# Patient Record
Sex: FEMALE | Race: WHITE | NOT HISPANIC OR LATINO | Employment: FULL TIME | URBAN - METROPOLITAN AREA
[De-identification: names, ages, dates, MRNs, and addresses within clinical notes are randomized per-mention and may not be internally consistent; named-entity substitution may affect disease eponyms.]

---

## 2018-07-17 ENCOUNTER — TRANSCRIBE ORDERS (OUTPATIENT)
Dept: ADMINISTRATIVE | Facility: HOSPITAL | Age: 53
End: 2018-07-17

## 2018-07-17 DIAGNOSIS — Z13.820 SCREENING FOR OSTEOPOROSIS: ICD-10-CM

## 2018-07-17 DIAGNOSIS — Z12.31 SCREENING MAMMOGRAM, ENCOUNTER FOR: Primary | ICD-10-CM

## 2018-07-25 ENCOUNTER — APPOINTMENT (OUTPATIENT)
Dept: BONE DENSITY | Facility: HOSPITAL | Age: 53
End: 2018-07-25

## 2018-07-25 ENCOUNTER — HOSPITAL ENCOUNTER (OUTPATIENT)
Dept: MAMMOGRAPHY | Facility: HOSPITAL | Age: 53
Discharge: HOME | End: 2018-07-25
Admitting: FAMILY MEDICINE

## 2018-07-25 DIAGNOSIS — Z13.820 SCREENING FOR OSTEOPOROSIS: ICD-10-CM

## 2018-07-25 DIAGNOSIS — Z12.31 SCREENING MAMMOGRAM, ENCOUNTER FOR: ICD-10-CM

## 2018-07-25 PROCEDURE — 77080 DXA BONE DENSITY AXIAL: CPT

## 2018-07-25 PROCEDURE — 77063 BREAST TOMOSYNTHESIS BI: CPT

## 2018-07-25 PROCEDURE — 77067 SCR MAMMO BI INCL CAD: CPT

## 2018-10-29 ENCOUNTER — LAB (OUTPATIENT)
Dept: LAB | Facility: HOSPITAL | Age: 53
End: 2018-10-29

## 2018-10-29 ENCOUNTER — TRANSCRIBE ORDERS (OUTPATIENT)
Dept: ADMINISTRATIVE | Facility: HOSPITAL | Age: 53
End: 2018-10-29

## 2018-10-29 DIAGNOSIS — Z79.1 ENCOUNTER FOR LONG-TERM (CURRENT) USE OF NON-STEROIDAL ANTI-INFLAMMATORIES: ICD-10-CM

## 2018-10-29 DIAGNOSIS — M05.79 SEROPOSITIVE RHEUMATOID ARTHRITIS OF MULTIPLE SITES (HCC): ICD-10-CM

## 2018-10-29 DIAGNOSIS — M05.79 SEROPOSITIVE RHEUMATOID ARTHRITIS OF MULTIPLE SITES (HCC): Primary | ICD-10-CM

## 2018-10-29 LAB
ALBUMIN SERPL-MCNC: 4.1 G/DL (ref 3.5–5.2)
ALP SERPL-CCNC: 79 U/L (ref 40–129)
ALT SERPL W P-5'-P-CCNC: 22 U/L (ref 5–33)
AST SERPL-CCNC: 18 U/L (ref 5–32)
BASOPHILS # BLD AUTO: 0.04 10*3/MM3 (ref 0–0.2)
BASOPHILS NFR BLD AUTO: 0.5 % (ref 0–2)
BILIRUB CONJ SERPL-MCNC: <0.2 MG/DL (ref 0.2–0.3)
BILIRUB INDIRECT SERPL-MCNC: ABNORMAL MG/DL
BILIRUB SERPL-MCNC: 0.3 MG/DL (ref 0.2–1.2)
DEPRECATED RDW RBC AUTO: 41.7 FL (ref 37–54)
EOSINOPHIL # BLD AUTO: 0.08 10*3/MM3 (ref 0.1–0.3)
EOSINOPHIL NFR BLD AUTO: 1 % (ref 0–4)
ERYTHROCYTE [DISTWIDTH] IN BLOOD BY AUTOMATED COUNT: 13.1 % (ref 11.5–14.5)
HCT VFR BLD AUTO: 39.1 % (ref 37–47)
HGB BLD-MCNC: 12.4 G/DL (ref 12–16)
IMM GRANULOCYTES # BLD: 0.05 10*3/MM3 (ref 0–0.03)
IMM GRANULOCYTES NFR BLD: 0.6 % (ref 0–0.5)
LYMPHOCYTES # BLD AUTO: 2.38 10*3/MM3 (ref 0.6–4.8)
LYMPHOCYTES NFR BLD AUTO: 30 % (ref 20–45)
MCH RBC QN AUTO: 27.9 PG (ref 27–31)
MCHC RBC AUTO-ENTMCNC: 31.7 G/DL (ref 31–37)
MCV RBC AUTO: 87.9 FL (ref 81–99)
MONOCYTES # BLD AUTO: 0.58 10*3/MM3 (ref 0–1)
MONOCYTES NFR BLD AUTO: 7.3 % (ref 3–8)
NEUTROPHILS # BLD AUTO: 4.81 10*3/MM3 (ref 1.5–8.3)
NEUTROPHILS NFR BLD AUTO: 60.6 % (ref 45–70)
NRBC BLD MANUAL-RTO: 0 /100 WBC (ref 0–0)
PLATELET # BLD AUTO: 336 10*3/MM3 (ref 140–500)
PMV BLD AUTO: 9.6 FL (ref 7.4–10.4)
PROT SERPL-MCNC: 7.6 G/DL (ref 6–8.5)
RBC # BLD AUTO: 4.45 10*6/MM3 (ref 4.2–5.4)
WBC NRBC COR # BLD: 7.94 10*3/MM3 (ref 4.8–10.8)

## 2018-10-29 PROCEDURE — 85025 COMPLETE CBC W/AUTO DIFF WBC: CPT

## 2018-10-29 PROCEDURE — 80076 HEPATIC FUNCTION PANEL: CPT

## 2018-10-29 PROCEDURE — 36415 COLL VENOUS BLD VENIPUNCTURE: CPT

## 2018-12-31 ENCOUNTER — TRANSCRIBE ORDERS (OUTPATIENT)
Dept: ADMINISTRATIVE | Facility: HOSPITAL | Age: 53
End: 2018-12-31

## 2018-12-31 ENCOUNTER — LAB (OUTPATIENT)
Dept: LAB | Facility: HOSPITAL | Age: 53
End: 2018-12-31

## 2018-12-31 DIAGNOSIS — M06.9 RHEUMATOID ARTHRITIS, INVOLVING UNSPECIFIED SITE, UNSPECIFIED RHEUMATOID FACTOR PRESENCE: Primary | ICD-10-CM

## 2018-12-31 DIAGNOSIS — M06.9 RHEUMATOID ARTHRITIS, INVOLVING UNSPECIFIED SITE, UNSPECIFIED RHEUMATOID FACTOR PRESENCE: ICD-10-CM

## 2018-12-31 DIAGNOSIS — Z92.25 STATUS POST RECENT IMMUNOSUPPRESSIVE THERAPY: ICD-10-CM

## 2018-12-31 LAB
ALBUMIN SERPL-MCNC: 4.3 G/DL (ref 3.5–5.2)
ALP SERPL-CCNC: 98 U/L (ref 40–129)
ALT SERPL W P-5'-P-CCNC: 21 U/L (ref 5–33)
AST SERPL-CCNC: 16 U/L (ref 5–32)
BASOPHILS # BLD AUTO: 0.04 10*3/MM3 (ref 0–0.2)
BASOPHILS NFR BLD AUTO: 0.4 % (ref 0–2)
BILIRUB CONJ SERPL-MCNC: <0.2 MG/DL (ref 0.2–0.3)
BILIRUB INDIRECT SERPL-MCNC: ABNORMAL MG/DL
BILIRUB SERPL-MCNC: 0.3 MG/DL (ref 0.2–1.2)
DEPRECATED RDW RBC AUTO: 40.4 FL (ref 37–54)
EOSINOPHIL # BLD AUTO: 0.08 10*3/MM3 (ref 0.1–0.3)
EOSINOPHIL NFR BLD AUTO: 0.8 % (ref 0–4)
ERYTHROCYTE [DISTWIDTH] IN BLOOD BY AUTOMATED COUNT: 13 % (ref 11.5–14.5)
HCT VFR BLD AUTO: 41.6 % (ref 37–47)
HGB BLD-MCNC: 13.5 G/DL (ref 12–16)
IMM GRANULOCYTES # BLD AUTO: 0.05 10*3/MM3 (ref 0–0.03)
IMM GRANULOCYTES NFR BLD AUTO: 0.5 % (ref 0–0.5)
LYMPHOCYTES # BLD AUTO: 2.49 10*3/MM3 (ref 0.6–4.8)
LYMPHOCYTES NFR BLD AUTO: 23.9 % (ref 20–45)
MCH RBC QN AUTO: 28 PG (ref 27–31)
MCHC RBC AUTO-ENTMCNC: 32.5 G/DL (ref 31–37)
MCV RBC AUTO: 86.1 FL (ref 81–99)
MONOCYTES # BLD AUTO: 0.67 10*3/MM3 (ref 0–1)
MONOCYTES NFR BLD AUTO: 6.4 % (ref 3–8)
NEUTROPHILS # BLD AUTO: 7.11 10*3/MM3 (ref 1.5–8.3)
NEUTROPHILS NFR BLD AUTO: 68 % (ref 45–70)
NRBC BLD AUTO-RTO: 0 /100 WBC (ref 0–0)
PLATELET # BLD AUTO: 359 10*3/MM3 (ref 140–500)
PMV BLD AUTO: 9.5 FL (ref 7.4–10.4)
PROT SERPL-MCNC: 8 G/DL (ref 6–8.5)
RBC # BLD AUTO: 4.83 10*6/MM3 (ref 4.2–5.4)
WBC NRBC COR # BLD: 10.44 10*3/MM3 (ref 4.8–10.8)

## 2018-12-31 PROCEDURE — 36415 COLL VENOUS BLD VENIPUNCTURE: CPT

## 2018-12-31 PROCEDURE — 80076 HEPATIC FUNCTION PANEL: CPT

## 2018-12-31 PROCEDURE — 85025 COMPLETE CBC W/AUTO DIFF WBC: CPT

## 2019-03-24 ENCOUNTER — TRANSCRIBE ORDERS (OUTPATIENT)
Dept: ADMINISTRATIVE | Facility: HOSPITAL | Age: 54
End: 2019-03-24

## 2019-03-24 ENCOUNTER — LAB (OUTPATIENT)
Dept: LAB | Facility: HOSPITAL | Age: 54
End: 2019-03-24

## 2019-03-24 DIAGNOSIS — Z92.25 STATUS POST RECENT IMMUNOSUPPRESSIVE THERAPY: ICD-10-CM

## 2019-03-24 DIAGNOSIS — M05.79 SEROPOSITIVE RHEUMATOID ARTHRITIS OF MULTIPLE SITES (HCC): Primary | ICD-10-CM

## 2019-03-24 DIAGNOSIS — M05.79 SEROPOSITIVE RHEUMATOID ARTHRITIS OF MULTIPLE SITES (HCC): ICD-10-CM

## 2019-03-24 LAB
ALBUMIN SERPL-MCNC: 4.1 G/DL (ref 3.5–5.2)
ALP SERPL-CCNC: 73 U/L (ref 39–117)
ALT SERPL W P-5'-P-CCNC: 22 U/L (ref 1–33)
AST SERPL-CCNC: 17 U/L (ref 1–32)
BILIRUB CONJ SERPL-MCNC: <0.2 MG/DL (ref 0–0.3)
BILIRUB INDIRECT SERPL-MCNC: NORMAL MG/DL
BILIRUB SERPL-MCNC: 0.2 MG/DL (ref 0.2–1.2)
PROT SERPL-MCNC: 7.4 G/DL (ref 6–8.5)

## 2019-03-24 PROCEDURE — 80076 HEPATIC FUNCTION PANEL: CPT

## 2019-03-24 PROCEDURE — 85025 COMPLETE CBC W/AUTO DIFF WBC: CPT

## 2019-03-24 PROCEDURE — 36415 COLL VENOUS BLD VENIPUNCTURE: CPT

## 2019-03-25 LAB
BASOPHILS # BLD AUTO: 0.02 10*3/MM3 (ref 0–0.2)
BASOPHILS NFR BLD AUTO: 0.3 % (ref 0–1.5)
DEPRECATED RDW RBC AUTO: 48.7 FL (ref 37–54)
EOSINOPHIL # BLD AUTO: 0.12 10*3/MM3 (ref 0–0.4)
EOSINOPHIL NFR BLD AUTO: 2 % (ref 0.3–6.2)
ERYTHROCYTE [DISTWIDTH] IN BLOOD BY AUTOMATED COUNT: 14.2 % (ref 12.3–15.4)
HCT VFR BLD AUTO: 43.6 % (ref 34–46.6)
HGB BLD-MCNC: 12.8 G/DL (ref 12–15.9)
IMM GRANULOCYTES # BLD AUTO: 0.02 10*3/MM3 (ref 0–0.05)
IMM GRANULOCYTES NFR BLD AUTO: 0.3 % (ref 0–0.5)
LYMPHOCYTES # BLD AUTO: 2.43 10*3/MM3 (ref 0.7–3.1)
LYMPHOCYTES NFR BLD AUTO: 41.4 % (ref 19.6–45.3)
MCH RBC QN AUTO: 27.8 PG (ref 26.6–33)
MCHC RBC AUTO-ENTMCNC: 29.4 G/DL (ref 31.5–35.7)
MCV RBC AUTO: 94.6 FL (ref 79–97)
MONOCYTES # BLD AUTO: 0.61 10*3/MM3 (ref 0.1–0.9)
MONOCYTES NFR BLD AUTO: 10.4 % (ref 5–12)
NEUTROPHILS # BLD AUTO: 2.67 10*3/MM3 (ref 1.4–7)
NEUTROPHILS NFR BLD AUTO: 45.6 % (ref 42.7–76)
NRBC BLD AUTO-RTO: 0.2 /100 WBC (ref 0–0)
PLATELET # BLD AUTO: 302 10*3/MM3 (ref 140–450)
PMV BLD AUTO: 10.7 FL (ref 6–12)
RBC # BLD AUTO: 4.61 10*6/MM3 (ref 3.77–5.28)
WBC NRBC COR # BLD: 5.87 10*3/MM3 (ref 3.4–10.8)

## 2019-07-19 ENCOUNTER — TRANSCRIBE ORDERS (OUTPATIENT)
Dept: ADMINISTRATIVE | Facility: HOSPITAL | Age: 54
End: 2019-07-19

## 2019-07-19 DIAGNOSIS — Z12.31 VISIT FOR SCREENING MAMMOGRAM: Primary | ICD-10-CM

## 2019-07-31 ENCOUNTER — LAB (OUTPATIENT)
Dept: LAB | Facility: HOSPITAL | Age: 54
End: 2019-07-31

## 2019-07-31 ENCOUNTER — HOSPITAL ENCOUNTER (OUTPATIENT)
Dept: MAMMOGRAPHY | Facility: HOSPITAL | Age: 54
Discharge: HOME | End: 2019-07-31
Admitting: FAMILY MEDICINE

## 2019-07-31 ENCOUNTER — TRANSCRIBE ORDERS (OUTPATIENT)
Dept: ADMINISTRATIVE | Facility: HOSPITAL | Age: 54
End: 2019-07-31

## 2019-07-31 DIAGNOSIS — M05.79 SEROPOSITIVE RHEUMATOID ARTHRITIS OF MULTIPLE SITES (HCC): Primary | ICD-10-CM

## 2019-07-31 DIAGNOSIS — M05.79 SEROPOSITIVE RHEUMATOID ARTHRITIS OF MULTIPLE SITES (HCC): ICD-10-CM

## 2019-07-31 DIAGNOSIS — Z12.31 VISIT FOR SCREENING MAMMOGRAM: ICD-10-CM

## 2019-07-31 DIAGNOSIS — Z92.25 STATUS POST RECENT IMMUNOSUPPRESSIVE THERAPY: ICD-10-CM

## 2019-07-31 PROCEDURE — 77063 BREAST TOMOSYNTHESIS BI: CPT

## 2019-07-31 PROCEDURE — 36415 COLL VENOUS BLD VENIPUNCTURE: CPT

## 2019-07-31 PROCEDURE — 77067 SCR MAMMO BI INCL CAD: CPT

## 2019-07-31 PROCEDURE — 80076 HEPATIC FUNCTION PANEL: CPT

## 2019-07-31 PROCEDURE — 85025 COMPLETE CBC W/AUTO DIFF WBC: CPT

## 2019-08-01 LAB
ALBUMIN SERPL-MCNC: 4.3 G/DL (ref 3.5–5.2)
ALP SERPL-CCNC: 73 U/L (ref 39–117)
ALT SERPL W P-5'-P-CCNC: 22 U/L (ref 1–33)
AST SERPL-CCNC: 22 U/L (ref 1–32)
BASOPHILS # BLD AUTO: 0.04 10*3/MM3 (ref 0–0.2)
BASOPHILS NFR BLD AUTO: 0.6 % (ref 0–1.5)
BILIRUB CONJ SERPL-MCNC: <0.2 MG/DL (ref 0.2–0.3)
BILIRUB INDIRECT SERPL-MCNC: ABNORMAL MG/DL
BILIRUB SERPL-MCNC: 0.2 MG/DL (ref 0.2–1.2)
DEPRECATED RDW RBC AUTO: 45 FL (ref 37–54)
EOSINOPHIL # BLD AUTO: 0.12 10*3/MM3 (ref 0–0.4)
EOSINOPHIL NFR BLD AUTO: 1.9 % (ref 0.3–6.2)
ERYTHROCYTE [DISTWIDTH] IN BLOOD BY AUTOMATED COUNT: 13.7 % (ref 12.3–15.4)
HCT VFR BLD AUTO: 43.1 % (ref 34–46.6)
HGB BLD-MCNC: 13.2 G/DL (ref 12–15.9)
IMM GRANULOCYTES # BLD AUTO: 0.04 10*3/MM3 (ref 0–0.05)
IMM GRANULOCYTES NFR BLD AUTO: 0.6 % (ref 0–0.5)
LYMPHOCYTES # BLD AUTO: 2.2 10*3/MM3 (ref 0.7–3.1)
LYMPHOCYTES NFR BLD AUTO: 34.3 % (ref 19.6–45.3)
MCH RBC QN AUTO: 28.1 PG (ref 26.6–33)
MCHC RBC AUTO-ENTMCNC: 30.6 G/DL (ref 31.5–35.7)
MCV RBC AUTO: 91.9 FL (ref 79–97)
MONOCYTES # BLD AUTO: 0.77 10*3/MM3 (ref 0.1–0.9)
MONOCYTES NFR BLD AUTO: 12 % (ref 5–12)
NEUTROPHILS # BLD AUTO: 3.25 10*3/MM3 (ref 1.7–7)
NEUTROPHILS NFR BLD AUTO: 50.6 % (ref 42.7–76)
NRBC BLD AUTO-RTO: 0 /100 WBC (ref 0–0.2)
PLATELET # BLD AUTO: 333 10*3/MM3 (ref 140–450)
PMV BLD AUTO: 10.5 FL (ref 6–12)
PROT SERPL-MCNC: 7.9 G/DL (ref 6–8.5)
RBC # BLD AUTO: 4.69 10*6/MM3 (ref 3.77–5.28)
WBC NRBC COR # BLD: 6.42 10*3/MM3 (ref 3.4–10.8)

## 2020-05-13 ENCOUNTER — TRANSCRIBE ORDERS (OUTPATIENT)
Dept: ADMINISTRATIVE | Facility: HOSPITAL | Age: 55
End: 2020-05-13

## 2020-05-13 ENCOUNTER — HOSPITAL ENCOUNTER (OUTPATIENT)
Dept: GENERAL RADIOLOGY | Facility: HOSPITAL | Age: 55
Discharge: HOME | End: 2020-05-13

## 2020-05-13 ENCOUNTER — HOSPITAL ENCOUNTER (OUTPATIENT)
Dept: GENERAL RADIOLOGY | Facility: HOSPITAL | Age: 55
Discharge: HOME | End: 2020-05-13
Admitting: INTERNAL MEDICINE

## 2020-05-13 DIAGNOSIS — M05.79 SEROPOSITIVE RHEUMATOID ARTHRITIS OF MULTIPLE SITES (HCC): ICD-10-CM

## 2020-05-13 DIAGNOSIS — M15.0 PRIMARY GENERALIZED HYPERTROPHIC OSTEOARTHROSIS: ICD-10-CM

## 2020-05-13 DIAGNOSIS — M05.79 SEROPOSITIVE RHEUMATOID ARTHRITIS OF MULTIPLE SITES (HCC): Primary | ICD-10-CM

## 2020-05-13 PROCEDURE — 73130 X-RAY EXAM OF HAND: CPT

## 2020-05-13 PROCEDURE — 72040 X-RAY EXAM NECK SPINE 2-3 VW: CPT

## 2020-10-26 ENCOUNTER — TRANSCRIBE ORDERS (OUTPATIENT)
Dept: ADMINISTRATIVE | Facility: HOSPITAL | Age: 55
End: 2020-10-26

## 2020-10-26 DIAGNOSIS — R10.9 ABDOMINAL PAIN, UNSPECIFIED ABDOMINAL LOCATION: Primary | ICD-10-CM

## 2020-10-30 ENCOUNTER — HOSPITAL ENCOUNTER (OUTPATIENT)
Dept: CT IMAGING | Facility: HOSPITAL | Age: 55
Discharge: HOME | End: 2020-10-30
Admitting: FAMILY MEDICINE

## 2020-10-30 DIAGNOSIS — R10.9 ABDOMINAL PAIN, UNSPECIFIED ABDOMINAL LOCATION: ICD-10-CM

## 2020-10-30 PROCEDURE — 74177 CT ABD & PELVIS W/CONTRAST: CPT

## 2020-10-30 PROCEDURE — 0 DIATRIZOATE MEGLUMINE & SODIUM PER 1 ML: Performed by: FAMILY MEDICINE

## 2020-10-30 PROCEDURE — 0 IOPAMIDOL PER 1 ML: Performed by: FAMILY MEDICINE

## 2020-10-30 RX ADMIN — IOPAMIDOL 100 ML: 755 INJECTION, SOLUTION INTRAVENOUS at 12:00

## 2020-10-30 RX ADMIN — DIATRIZOATE MEGLUMINE AND DIATRIZOATE SODIUM 30 ML: 600; 100 SOLUTION ORAL; RECTAL at 10:30

## 2020-11-12 ENCOUNTER — OFFICE VISIT (OUTPATIENT)
Dept: GASTROENTEROLOGY | Facility: CLINIC | Age: 55
End: 2020-11-12

## 2020-11-12 VITALS — TEMPERATURE: 98.2 F | BODY MASS INDEX: 33.84 KG/M2 | OXYGEN SATURATION: 98 % | WEIGHT: 185 LBS

## 2020-11-12 DIAGNOSIS — K21.00 GASTROESOPHAGEAL REFLUX DISEASE WITH ESOPHAGITIS WITHOUT HEMORRHAGE: ICD-10-CM

## 2020-11-12 DIAGNOSIS — R19.00 PELVIC MASS: Primary | ICD-10-CM

## 2020-11-12 DIAGNOSIS — K59.09 OTHER CONSTIPATION: ICD-10-CM

## 2020-11-12 DIAGNOSIS — Z12.11 ENCOUNTER FOR SCREENING FOR MALIGNANT NEOPLASM OF COLON: ICD-10-CM

## 2020-11-12 DIAGNOSIS — K44.9 HIATAL HERNIA: ICD-10-CM

## 2020-11-12 PROCEDURE — 99204 OFFICE O/P NEW MOD 45 MIN: CPT | Performed by: INTERNAL MEDICINE

## 2020-11-12 RX ORDER — FOLIC ACID 1 MG/1
1 TABLET ORAL DAILY
COMMUNITY
End: 2022-03-28

## 2020-11-12 RX ORDER — ETANERCEPT 50 MG/ML
50 SOLUTION SUBCUTANEOUS 2 TIMES WEEKLY
COMMUNITY
End: 2022-03-28

## 2020-11-12 RX ORDER — SULINDAC 150 MG/1
150 TABLET ORAL 2 TIMES DAILY
COMMUNITY
End: 2020-12-22 | Stop reason: HOSPADM

## 2020-11-12 RX ORDER — OMEPRAZOLE 40 MG/1
40 CAPSULE, DELAYED RELEASE ORAL DAILY
Qty: 90 CAPSULE | Refills: 3 | Status: SHIPPED | OUTPATIENT
Start: 2020-11-12 | End: 2022-03-28

## 2020-11-12 NOTE — PROGRESS NOTES
PATIENT INFORMATION  Fanny Winchester       - 1965    CHIEF COMPLAINT  Chief Complaint   Patient presents with   • Abdominal Pain   • Constipation       HISTORY OF PRESENT ILLNESS  Here for several reasons mostly for screening but due to abd distension she has a vey large ovarian cyst to be reomoved tomorrow    Also a H/H and mild constipation and is to take a bowel prep prior to her resection    No dysphagia nor vomiting nor weight loss, reviewed her CT images with her.      REVIEWED PERTINENT RESULTS/ LABS  No results found for: CASEREPORT, FINALDX  Lab Results   Component Value Date    HGB 13.2 2019    MCV 91.9 2019     2019    ALT 22 2019    AST 22 2019      Ct Abdomen Pelvis With Contrast    Result Date: 10/30/2020  Narrative: PROCEDURE: CT Abdomen Pelvis W INDICATION: ABDOMINAL Pain; unspecified abdominal pain  Pt states generalized abd pain and constipation off and on since July of this year; Pt states occasional nausea; Hysterectomy, pt unsure if she has ovaries TECHNIQUE:  CT  abdomen and pelvis are performed after IV contrast.  Oral contrast administered with normal oral dosing protocol.  Coronal and sagittal reconstructions were obtained.  Radiation dose reduction techniques included automated exposure control or exposure modulation based on body size. Count of known CT and cardiac nuc med studies performed in previous 12 months: 2. .  COMPARISON: No relevant comparison or correlation studies available at time of dictation.  FINDINGS: Artifacts:  No significant artifacts, Mesentery/peritoneal cavity: No free air or inflammatory change appreciated. Trace amount of free fluid seen in the pelvis, nonspecific. Liver:  Within normal limits  Spleen:  Within normal limits  Kidneys: Compression affect upon the distal right ureter due to the large mass in the pelvis, see separate heading of the pelvis. Pancreas:  Within normal limits  Gallbladder/ bile ducts:  No gross  gallstones seen.  No biliary dilatation noted. Adrenal glands:  Nonenlarged.  Pelvis:   Large pelvic mass extending into the abdomen identified. The mass is well circumscribed measuring approximately 23 x 20 x 15.5 cm. There are numerous septations within the lesion as well as numerous densities including a dominant cystic area measuring 9.6 cm and ill-defined area of high attenuation estimated around 12 x 7 cm on the left side of the lesion. In the right hemipelvis along the inferior margin lesion, small structure with mixed density measuring 2.8 x 2 cm could be the right adnexa. The uterus is surgically absent. Normal left adnexa not seen. Other:  Abnormal appearance of the GE junction with dilated distal esophagus estimated at 5.4 cm. There is retention of enteric contrast in the distal esophagus. This may be a large hiatal hernia. Osseous structures:  No acute osseous abnormality seen.      Impression: 1. Very large pelvic mass measuring over 20 cm in size with mixed densities. There is a history of hysterectomy but patient is unsure if ovaries were removed. This is most likely to represent an ovarian neoplasm though determining malignant versus benign is difficult by imaging. At this time no lymphadenopathy is seen. The mass also produces mild hydronephrosis of the right kidney. 2. Enlargement of the GE junction with dilatation of the esophagus and retention of contrast. Consider upper GI follow-up to exclude other pathology. Given the concern for unexpected large neoplasm and potential malignancy, Dr. Perez was contacted. He was unavailable at the time of the call but, nurse in office, Serene verbally knowledge results and will contact Dr. Perez by text message. I provided a cell phone number to discuss results directly if desired.   Signer Name: Alisha De La Vega MD  Signed: 10/30/2020 1:45 PM  Workstation Name: [facility]  [facility]      REVIEW OF SYSTEMS  Review of Systems  "  Gastrointestinal: Positive for abdominal pain and constipation.   All other systems reviewed and are negative.        ACTIVE PROBLEMS  There are no active problems to display for this patient.        PAST MEDICAL HISTORY  History reviewed. No pertinent past medical history.      SURGICAL HISTORY  History reviewed. No pertinent surgical history.      FAMILY HISTORY  Family History   Problem Relation Age of Onset   • Colon cancer Maternal Aunt    • Colon polyps Maternal Aunt    • Breast cancer Neg Hx          SOCIAL HISTORY  Social History     Occupational History   • Not on file   Tobacco Use   • Smoking status: Never Smoker   • Smokeless tobacco: Never Used   Substance and Sexual Activity   • Alcohol use: Not on file   • Drug use: Not on file   • Sexual activity: Not on file         CURRENT MEDICATIONS    Current Outpatient Medications:   •  etanercept (Enbrel) 50 MG/ML solution prefilled syringe injection, Inject 50 mg under the skin into the appropriate area as directed 2 (Two) Times a Week., Disp: , Rfl:   •  folic acid (FOLVITE) 1 MG tablet, Take 1 mg by mouth Daily., Disp: , Rfl:   •  sulindac (CLINORIL) 150 MG tablet, Take 150 mg by mouth 2 (Two) Times a Day., Disp: , Rfl:   •  omeprazole (priLOSEC) 40 MG capsule, Take 1 capsule by mouth Daily., Disp: 90 capsule, Rfl: 3    ALLERGIES  Patient has no known allergies.    VITALS  Vitals:    11/12/20 1203   Temp: 98.2 °F (36.8 °C)   TempSrc: Temporal   SpO2: 98%   Weight: 83.9 kg (185 lb)        PHYSICAL EXAM  Debilities/Disabilities Identified: None  Emotional Behavior: Appropriate  Wt Readings from Last 3 Encounters:   11/12/20 83.9 kg (185 lb)   02/05/15 85.3 kg (188 lb 0.1 oz)     Ht Readings from Last 1 Encounters:   02/05/15 157.5 cm (62\")     Body mass index is 33.84 kg/m².  Physical Exam  Constitutional:       Appearance: She is well-developed.   HENT:      Head: Normocephalic and atraumatic.   Eyes:      General: No scleral icterus.     Pupils: Pupils " are equal, round, and reactive to light.   Neck:      Musculoskeletal: Normal range of motion and neck supple.      Thyroid: No thyromegaly.   Cardiovascular:      Rate and Rhythm: Normal rate and regular rhythm.      Heart sounds: Normal heart sounds. No murmur. No gallop.    Pulmonary:      Effort: Pulmonary effort is normal.      Breath sounds: Normal breath sounds. No wheezing or rales.   Abdominal:      General: Bowel sounds are normal. There is distension. There is no abdominal bruit.      Palpations: Abdomen is soft. Abdomen is not rigid. There is no shifting dullness, fluid wave, hepatomegaly, splenomegaly, mass or pulsatile mass.      Tenderness: There is generalized abdominal tenderness. There is no guarding or rebound. Negative signs include Carrera's sign.      Hernia: No hernia is present. There is no hernia in the ventral area.   Musculoskeletal: Normal range of motion.   Lymphadenopathy:      Cervical: No cervical adenopathy.   Skin:     General: Skin is warm and dry.      Findings: No erythema or rash.   Neurological:      Mental Status: She is alert and oriented to person, place, and time.   Psychiatric:         Mood and Affect: Mood normal.         Behavior: Behavior normal.         CLINICAL DATA REVIEWED   reviewed previous lab results and integrated with today's visit, reviewed notes from other physicians and/or last GI encounter, reviewed previous endoscopy results and available photos, reviewed surgical pathology results from previous biopsies    ASSESSMENT  Diagnoses and all orders for this visit:    Pelvic mass    Encounter for screening for malignant neoplasm of colon  -     Case Request; Standing  -     Case Request    Other constipation    Hiatal hernia    Gastroesophageal reflux disease with esophagitis without hemorrhage  -     Case Request; Standing  -     Case Request    Other orders  -     etanercept (Enbrel) 50 MG/ML solution prefilled syringe injection; Inject 50 mg under the skin  into the appropriate area as directed 2 (Two) Times a Week.  -     folic acid (FOLVITE) 1 MG tablet; Take 1 mg by mouth Daily.  -     sulindac (CLINORIL) 150 MG tablet; Take 150 mg by mouth 2 (Two) Times a Day.  -     omeprazole (priLOSEC) 40 MG capsule; Take 1 capsule by mouth Daily.  -     Follow Anesthesia Guidelines / Protocol; Future  -     Obtain Informed Consent; Standing          PLAN  Will start PPI and schedule her Double endoscopy waiting for her to recover from her surgery tomorrow.  Return if symptoms worsen or fail to improve.    I have discussed the above plan with the patient.  They verbalize understanding and are in agreement with the plan.  They have been advised to contact the office for any questions, concerns, or changes related to their health.

## 2020-11-23 PROBLEM — K21.00 GASTROESOPHAGEAL REFLUX DISEASE WITH ESOPHAGITIS WITHOUT HEMORRHAGE: Status: ACTIVE | Noted: 2020-11-23

## 2020-11-23 PROBLEM — Z12.11 ENCOUNTER FOR SCREENING FOR MALIGNANT NEOPLASM OF COLON: Status: ACTIVE | Noted: 2020-11-23

## 2020-12-16 ENCOUNTER — TRANSCRIBE ORDERS (OUTPATIENT)
Dept: ADMINISTRATIVE | Facility: HOSPITAL | Age: 55
End: 2020-12-16

## 2020-12-16 DIAGNOSIS — Z01.818 PREOP EXAMINATION: Primary | ICD-10-CM

## 2020-12-19 ENCOUNTER — LAB (OUTPATIENT)
Dept: LAB | Facility: HOSPITAL | Age: 55
End: 2020-12-19

## 2020-12-19 DIAGNOSIS — Z01.818 PREOP EXAMINATION: ICD-10-CM

## 2020-12-19 LAB — SARS-COV-2 RNA PNL SPEC NAA+PROBE: NOT DETECTED

## 2020-12-19 PROCEDURE — C9803 HOPD COVID-19 SPEC COLLECT: HCPCS | Performed by: INTERNAL MEDICINE

## 2020-12-19 PROCEDURE — 87635 SARS-COV-2 COVID-19 AMP PRB: CPT | Performed by: INTERNAL MEDICINE

## 2020-12-19 PROCEDURE — C9803 HOPD COVID-19 SPEC COLLECT: HCPCS

## 2020-12-21 ENCOUNTER — ANESTHESIA EVENT (OUTPATIENT)
Dept: PERIOP | Facility: HOSPITAL | Age: 55
End: 2020-12-21

## 2020-12-22 ENCOUNTER — ANESTHESIA (OUTPATIENT)
Dept: PERIOP | Facility: HOSPITAL | Age: 55
End: 2020-12-22

## 2020-12-22 ENCOUNTER — HOSPITAL ENCOUNTER (OUTPATIENT)
Facility: HOSPITAL | Age: 55
Setting detail: HOSPITAL OUTPATIENT SURGERY
Discharge: HOME | End: 2020-12-22
Attending: INTERNAL MEDICINE | Admitting: INTERNAL MEDICINE

## 2020-12-22 VITALS
TEMPERATURE: 97.8 F | RESPIRATION RATE: 16 BRPM | DIASTOLIC BLOOD PRESSURE: 72 MMHG | SYSTOLIC BLOOD PRESSURE: 119 MMHG | HEART RATE: 69 BPM | WEIGHT: 170.6 LBS | OXYGEN SATURATION: 98 % | BODY MASS INDEX: 31.2 KG/M2

## 2020-12-22 DIAGNOSIS — Z12.11 ENCOUNTER FOR SCREENING FOR MALIGNANT NEOPLASM OF COLON: ICD-10-CM

## 2020-12-22 DIAGNOSIS — K21.00 GASTROESOPHAGEAL REFLUX DISEASE WITH ESOPHAGITIS WITHOUT HEMORRHAGE: ICD-10-CM

## 2020-12-22 PROCEDURE — 43239 EGD BIOPSY SINGLE/MULTIPLE: CPT | Performed by: INTERNAL MEDICINE

## 2020-12-22 PROCEDURE — 88305 TISSUE EXAM BY PATHOLOGIST: CPT | Performed by: INTERNAL MEDICINE

## 2020-12-22 PROCEDURE — 88342 IMHCHEM/IMCYTCHM 1ST ANTB: CPT | Performed by: INTERNAL MEDICINE

## 2020-12-22 PROCEDURE — 45380 COLONOSCOPY AND BIOPSY: CPT | Performed by: INTERNAL MEDICINE

## 2020-12-22 PROCEDURE — 25010000002 PROPOFOL 10 MG/ML EMULSION: Performed by: NURSE ANESTHETIST, CERTIFIED REGISTERED

## 2020-12-22 RX ORDER — LIDOCAINE HYDROCHLORIDE 10 MG/ML
0.5 INJECTION, SOLUTION EPIDURAL; INFILTRATION; INTRACAUDAL; PERINEURAL ONCE AS NEEDED
Status: DISCONTINUED | OUTPATIENT
Start: 2020-12-22 | End: 2020-12-22 | Stop reason: HOSPADM

## 2020-12-22 RX ORDER — LIDOCAINE HYDROCHLORIDE 20 MG/ML
INJECTION, SOLUTION INFILTRATION; PERINEURAL AS NEEDED
Status: DISCONTINUED | OUTPATIENT
Start: 2020-12-22 | End: 2020-12-22 | Stop reason: SURG

## 2020-12-22 RX ORDER — ONDANSETRON 2 MG/ML
4 INJECTION INTRAMUSCULAR; INTRAVENOUS ONCE AS NEEDED
Status: DISCONTINUED | OUTPATIENT
Start: 2020-12-22 | End: 2020-12-22 | Stop reason: HOSPADM

## 2020-12-22 RX ORDER — SODIUM CHLORIDE 9 MG/ML
40 INJECTION, SOLUTION INTRAVENOUS AS NEEDED
Status: DISCONTINUED | OUTPATIENT
Start: 2020-12-22 | End: 2020-12-22 | Stop reason: HOSPADM

## 2020-12-22 RX ORDER — SODIUM CHLORIDE, SODIUM LACTATE, POTASSIUM CHLORIDE, CALCIUM CHLORIDE 600; 310; 30; 20 MG/100ML; MG/100ML; MG/100ML; MG/100ML
100 INJECTION, SOLUTION INTRAVENOUS CONTINUOUS
Status: DISCONTINUED | OUTPATIENT
Start: 2020-12-22 | End: 2020-12-22 | Stop reason: HOSPADM

## 2020-12-22 RX ORDER — SODIUM CHLORIDE, SODIUM LACTATE, POTASSIUM CHLORIDE, CALCIUM CHLORIDE 600; 310; 30; 20 MG/100ML; MG/100ML; MG/100ML; MG/100ML
9 INJECTION, SOLUTION INTRAVENOUS CONTINUOUS
Status: DISCONTINUED | OUTPATIENT
Start: 2020-12-22 | End: 2020-12-22 | Stop reason: HOSPADM

## 2020-12-22 RX ORDER — SODIUM CHLORIDE 0.9 % (FLUSH) 0.9 %
10 SYRINGE (ML) INJECTION AS NEEDED
Status: DISCONTINUED | OUTPATIENT
Start: 2020-12-22 | End: 2020-12-22 | Stop reason: HOSPADM

## 2020-12-22 RX ORDER — SODIUM CHLORIDE 0.9 % (FLUSH) 0.9 %
10 SYRINGE (ML) INJECTION EVERY 12 HOURS SCHEDULED
Status: DISCONTINUED | OUTPATIENT
Start: 2020-12-22 | End: 2020-12-22 | Stop reason: HOSPADM

## 2020-12-22 RX ORDER — PROPOFOL 10 MG/ML
VIAL (ML) INTRAVENOUS AS NEEDED
Status: DISCONTINUED | OUTPATIENT
Start: 2020-12-22 | End: 2020-12-22 | Stop reason: SURG

## 2020-12-22 RX ADMIN — PROPOFOL 50 MG: 10 INJECTION, EMULSION INTRAVENOUS at 16:52

## 2020-12-22 RX ADMIN — PROPOFOL 50 MG: 10 INJECTION, EMULSION INTRAVENOUS at 16:46

## 2020-12-22 RX ADMIN — LIDOCAINE HYDROCHLORIDE 10 MG: 20 INJECTION, SOLUTION INFILTRATION; PERINEURAL at 16:38

## 2020-12-22 RX ADMIN — PROPOFOL 50 MG: 10 INJECTION, EMULSION INTRAVENOUS at 16:55

## 2020-12-22 RX ADMIN — PROPOFOL 75 MG: 10 INJECTION, EMULSION INTRAVENOUS at 16:41

## 2020-12-22 RX ADMIN — PROPOFOL 25 MG: 10 INJECTION, EMULSION INTRAVENOUS at 16:43

## 2020-12-22 RX ADMIN — PROPOFOL 50 MG: 10 INJECTION, EMULSION INTRAVENOUS at 16:49

## 2020-12-22 RX ADMIN — SODIUM CHLORIDE, POTASSIUM CHLORIDE, SODIUM LACTATE AND CALCIUM CHLORIDE 9 ML/HR: 600; 310; 30; 20 INJECTION, SOLUTION INTRAVENOUS at 14:38

## 2020-12-22 RX ADMIN — PROPOFOL 50 MG: 10 INJECTION, EMULSION INTRAVENOUS at 17:01

## 2020-12-22 NOTE — OP NOTE
ESOPHAGOGASTRODUODENOSCOPY, COLONOSCOPY  Procedure Report    Patient Name:  Fanny Winchester  YOB: 1965    Date of Surgery:  12/22/2020     Indications:  Encounter for screening for malignant neoplasm of colon [Z12.11]  Gastroesophageal reflux disease with esophagitis without hemorrhage [K21.00]      Pre-op Diagnosis:   Encounter for screening for malignant neoplasm of colon [Z12.11]  Gastroesophageal reflux disease with esophagitis without hemorrhage [K21.00]    Post-Op Diagnosis Codes:     * Encounter for screening for malignant neoplasm of colon [Z12.11]     * Gastroesophageal reflux disease with esophagitis without hemorrhage [K21.00]     * Duodenitis [535.6]     * Gastritis [K29.70]     * Reflux esophagitis [K21.00]     * Hiatal hernia [K44.9]     * Colon polyp [K63.5]         Procedure/CPT® Codes:      Procedure(s):  ESOPHAGOGASTRODUODENOSCOPY with biopsies  COLONOSCOPY with polypectomy    Staff:  Surgeon(s):  Adarsh Arredondo MD         Anesthesia: Monitored Anesthesia Care    Estimated Blood Loss: none    Specimens:   ID Type Source Tests Collected by Time   A (Not marked as sent) :  Tissue Small Intestine, Duodenum TISSUE PATHOLOGY EXAM Adarsh Arredondo MD 12/22/2020 1647   B (Not marked as sent) :  Tissue Stomach TISSUE PATHOLOGY EXAM Adarsh Arredondo MD 12/22/2020 1648   C (Not marked as sent) :  Tissue Esophagus, Distal TISSUE PATHOLOGY EXAM Adarsh Arredondo MD 12/22/2020 1650   D (Not marked as sent) :  Polyp Large Intestine, Rectum TISSUE PATHOLOGY EXAM Adarsh Arredondo MD 12/22/2020 1705       Implants:    Nothing was implanted during the procedure      Description of Procedure: After having signed informed consent, she was brought to the endoscopy suite, placed in left lateral decubitus position and given her IV sedation. A bite block was placed between her incisors. The scope was introduced in the oropharynx; advanced under direct  visualization with ease in the esophagus, through the distal esophagus. The Z-line was mildly irregular. There was no active ulcer or stricture. The scope was advanced in the stomach and retroflexed revealing diffuse petechial erythematous lesions throughout the fundus and body. The scope was deretroflexed and advanced in the antrum. The antrum itself was fairly normal-appearing. The scope was advanced through the pylorus, through the duodenal bulb that had scattered areas of erythema but no erosions or ulcers. The scope was advanced around the angle, up to the 2nd and 3rd portions of the duodenum revealing a normal ampulla and otherwise normal duodenal mucosa. The scope was withdrawn back in the antrum and advanced back into the bulb. Biopsies were taken of the mild duodenitis. The scope was withdrawn back into the antrum and biopsies were taken of the body of the stomach where the multiple petechial erythematous lesions were identified. The scope was then withdrawn in the distal esophagus and biopsies were taken in a targeted fashion of the mildly irregular Z-line to document any persistent reflux esophagitis and to rule out short segment Gonzalez’s esophagus. This was not suspected visually. As the scope was withdrawn, the remainder of the esophagus was easily distended and normal-appearing. The scope was taken from the patient. She tolerated that procedure well.     The patient was kept in the left lateral decubitus position and repositioned in the room. Rectal exam revealed no external lesions, normal anal tone, no rectal mass. The scope was introduced in the rectum and advanced under direct visualization past a small polyp in the rectum, through the sigmoid colon, descending colon, to and around the splenic flexure; reduced and advanced through the transverse colon, to and around the hepatic flexure; reduced in the ascending colon and then advanced easily into the cecum. The cecum was identified by the  appendiceal orifice and the ileocecal valve. It was well-prepped and normal-appearing. The scope was withdrawn in the ileocecal valve. This was intubated. The terminal ileum was normal-appearing. The scope was withdrawn back in the ascending colon, advanced to the cecum and withdrawn slowly; examined the colon in a circumferential fashion. It was fairly well-prepped throughout. There was some particulate matter that could not be aspirated, but most of the remaining bowel contents could be flushed and cleared. There were no mucosal lesions noted throughout the cecum, ascending colon, hepatic flexure, transverse, descending or sigmoid colon. In the rectum, the previously noted polyp was reidentified. It appeared to be 4 x 6 mm. It was biopsied, felt to be completely removed, sent separately as rectal polyp. The scope was withdrawn in the distal rectum and retroflexed revealing an intact dentate line and no mucosal lesion. The scope was deretroflexed and withdrawn. The patient tolerated both procedures well.          Findings: Mild Duodenitis-Biopsy  Moderate Gastritis-Biopsy  Reflux Esophagitis-Biopsy    Colon to TI good Prep  Polyp-Biopsy       Complications: None    Recommendations: Results to be called. Daily PPI, Avoid NSAIDS and F/U in 2 months      Adarsh Arredondo MD     Date: 12/22/2020  Time: 17:19 EST

## 2020-12-22 NOTE — ANESTHESIA POSTPROCEDURE EVALUATION
Patient: Fanny Winchester    Procedure Summary     Date: 12/22/20 Room / Location: [facility] ENDOSCOPY 1 /  [facility]    Anesthesia Start: 1637 Anesthesia Stop: 1710    Procedures:       ESOPHAGOGASTRODUODENOSCOPY with biopsies (N/A Esophagus)      COLONOSCOPY with polypectomy (N/A ) Diagnosis:       Encounter for screening for malignant neoplasm of colon      Gastroesophageal reflux disease with esophagitis without hemorrhage      Duodenitis      Gastritis      Reflux esophagitis      Hiatal hernia      Colon polyp      (Encounter for screening for malignant neoplasm of colon [Z12.11])      (Gastroesophageal reflux disease with esophagitis without hemorrhage [K21.00])    Surgeon: Adarsh Arredondo MD Provider: Michelle Vargas CRNA    Anesthesia Type: MAC ASA Status: 2          Anesthesia Type: MAC    Vitals  Vitals Value Taken Time   /84 12/22/20 1728   Temp     Pulse 64 12/22/20 1728   Resp 15 12/22/20 1728   SpO2 98 % 12/22/20 1728           Post Anesthesia Care and Evaluation    Patient location during evaluation: PHASE II  Patient participation: complete - patient participated  Level of consciousness: awake  Pain management: adequate  Airway patency: patent  Anesthetic complications: No anesthetic complications  PONV Status: none  Cardiovascular status: acceptable  Respiratory status: acceptable  Hydration status: acceptable

## 2020-12-22 NOTE — ANESTHESIA PREPROCEDURE EVALUATION
Anesthesia Evaluation     Patient summary reviewed and Nursing notes reviewed   no history of anesthetic complications:  NPO Solid Status: > 8 hours  NPO Liquid Status: > 8 hours           Airway   Mallampati: II  TM distance: <3 FB  Neck ROM: full  No difficulty expected  Dental - normal exam     Pulmonary - negative pulmonary ROS and normal exam   Cardiovascular - normal exam  Exercise tolerance: good (4-7 METS)    ECG reviewed        Neuro/Psych  (+) psychiatric history Anxiety and Depression,     GI/Hepatic/Renal/Endo    (+)  hiatal hernia,      Musculoskeletal     Abdominal  - normal exam   Substance History   (+) alcohol use,      OB/GYN negative ob/gyn ROS         Other   arthritis (RA, OA, Fibromyalgia), autoimmune disease rheumatoid arthritis,                      Anesthesia Plan    ASA 2     MAC     intravenous induction     Anesthetic plan, all risks, benefits, and alternatives have been provided, discussed and informed consent has been obtained with: patient.  Use of blood products discussed with patient  Consented to blood products.

## 2020-12-22 NOTE — BRIEF OP NOTE
ESOPHAGOGASTRODUODENOSCOPY, COLONOSCOPY  Progress Note    Fanny Winchester  12/22/2020    Pre-op Diagnosis:   Encounter for screening for malignant neoplasm of colon [Z12.11]  Gastroesophageal reflux disease with esophagitis without hemorrhage [K21.00]       Post-Op Diagnosis Codes:     * Encounter for screening for malignant neoplasm of colon [Z12.11]     * Gastroesophageal reflux disease with esophagitis without hemorrhage [K21.00]     * Duodenitis [535.6]     * Gastritis [K29.70]     * Reflux esophagitis [K21.00]     * Hiatal hernia [K44.9]     * Colon polyp [K63.5]    Procedure/CPT® Codes:        Procedure(s):  ESOPHAGOGASTRODUODENOSCOPY with biopsies  COLONOSCOPY    Surgeon(s):  Adarsh Arredondo MD    Anesthesia: Monitored Anesthesia Care    Staff:   Circulator: Alina Boswell RN  Scrub Person: Ilda De La O         Estimated Blood Loss: none    Urine Voided: * No values recorded between 12/22/2020  4:36 PM and 12/22/2020  5:11 PM *    Specimens:                Specimens     ID Source Type Tests Collected By Collected At Frozen?      A Small Intestine, Duodenum Tissue · TISSUE PATHOLOGY EXAM   Adarsh Arredondo MD 12/22/20 1647      This specimen was not marked as sent.    B Stomach Tissue · TISSUE PATHOLOGY EXAM   Adarsh Arredondo MD 12/22/20 1648      This specimen was not marked as sent.    C Esophagus, Distal Tissue · TISSUE PATHOLOGY EXAM   Adarsh Arredondo MD 12/22/20 1650      This specimen was not marked as sent.    D Large Intestine, Rectum Polyp · TISSUE PATHOLOGY EXAM   Adarsh Arredondo MD 12/22/20 1705      This specimen was not marked as sent.                Drains: * No LDAs found *    Findings: Mild Duodenitis-Biopsy  Moderate Gastritis-Biopsy  Reflux Esophagitis-Biopsy    Colon to TI good Prep  Polyp-Biopsy    Complications: None          Adarsh Arredondo MD     Date: 12/22/2020  Time: 17:14 EST

## 2020-12-23 NOTE — H&P
Patient Care Team:  Chris Perez MD as PCP - General (Family Medicine)    CHIEF COMPLAINT: Screening CRC and dyspepsia    HISTORY OF PRESENT ILLNESS:  No previous Colon and just had a large ovarian mass excised, is now on a daily PPI    Past Medical History:   Diagnosis Date   • Anxiety    • Fibromyalgia      Past Surgical History:   Procedure Laterality Date   • HERNIA REPAIR     • OVARY SURGERY       Family History   Problem Relation Age of Onset   • Colon cancer Maternal Aunt    • Colon polyps Maternal Aunt    • Breast cancer Neg Hx      Social History     Tobacco Use   • Smoking status: Never Smoker   • Smokeless tobacco: Never Used   Substance Use Topics   • Alcohol use: Not on file   • Drug use: Not on file     No medications prior to admission.     Allergies:  Patient has no known allergies.    REVIEW OF SYSTEMS:  Please see the above history of present illness for pertinent positives and negatives.  The remainder of the patient's systems have been reviewed and are negative.     Vital Signs  Temp:  [97.8 °F (36.6 °C)] 97.8 °F (36.6 °C)  Heart Rate:  [64-74] 69  Resp:  [12-17] 16  BP: (116-130)/(72-92) 119/72    Flowsheet Rows      First Filed Value   Admission Height  --   Admission Weight  77.4 kg (170 lb 9.6 oz) Documented at 12/22/2020 1430           Physical Exam:  Physical Exam   Constitutional: Patient appears well-developed and well-nourished and in no acute distress   HEENT:   Head: Normocephalic and atraumatic.   Eyes:  Pupils are equal, round, and reactive to light. EOM are intact. Sclerae are anicteric and non-injected.  Mouth and Throat: Patient has moist mucous membranes. Oropharynx is clear of any erythema or exudate.     Neck: Neck supple. No JVD present. No thyromegaly present. No lymphadenopathy present.  Cardiovascular: Regular rate, regular rhythm, S1 normal and S2 normal.  Exam reveals no gallop and no friction rub.  No murmur heard.  Pulmonary/Chest: Lungs are clear to  auscultation bilaterally. No respiratory distress. No wheezes. No rhonchi. No rales.   Abdominal: Soft. Bowel sounds are normal. No distension and no mass. There is no hepatosplenomegaly. There is no tenderness.   Musculoskeletal: Normal Muscle tone  Extremities: No edema. Pulses are palpable in all 4 extremities.  Neurological: Patient is alert and oriented to person, place, and time. Cranial nerves II-XII are grossly intact with no focal deficits.  Skin: Skin is warm. No rash noted. Nails show no clubbing.  No cyanosis or erythema.    Debilities/Disabilities Identified: None  Emotional Behavior: Appropriate     Results Review:    I reviewed the patient's new clinical results.  Lab Results (most recent)     Procedure Component Value Units Date/Time    Tissue Pathology Exam [[id number]] Collected: 12/22/20 1647    Specimen: Tissue from Small Intestine, Duodenum; Tissue from Stomach; Tissue from Esophagus, Distal; Polyp from Large Intestine, Rectum Updated: 12/22/20 1737    COVID PRE-OP / PRE-PROCEDURE SCREENING ORDER (NO ISOLATION) - Swab, Nasal Cavity [[id number]]  (Normal) Collected: 12/19/20 1917    Specimen: Swab from Nasal Cavity Updated: 12/19/20 2015    Narrative:      The following orders were created for panel order COVID PRE-OP / PRE-PROCEDURE SCREENING ORDER (NO ISOLATION) - Swab, Nasal Cavity.  Procedure                               Abnormality         Status                     ---------                               -----------         ------                     COVID-19,[facility]...[[id number]]  Normal              Final result                 Please view results for these tests on the individual orders.    COVID-19,[facility] [employer] IN-HOUSE,Nasal Swab No Transport Media 3-4 HR TAT - Swab, Nasal Cavity [[id number]]  (Normal) Collected: 12/19/20 1917    Specimen: Swab from Nasal Cavity Updated: 12/19/20 2015     COVID19 Not Detected    Narrative:      Fact sheet for providers:  https://www.fda.gov/media/517338/download     Fact sheet for patients: https://www.fda.gov/media/902674/download    Test performed by PCR.          Imaging Results (Most Recent)     None        reviewed    ECG/EMG Results (most recent)     None        reviewed    Assessment/Plan   Screening CRC and dyspepsia/  EGD and colonoscopy      I discussed the patients findings and my recommendations with patient.     Adarsh Arredondo MD  12/22/20  22:44 EST    Time: 10 min prior to procedure.

## 2020-12-28 LAB
LAB AP CASE REPORT: NORMAL
LAB AP DIAGNOSIS COMMENT: NORMAL
PATH REPORT.ADDENDUM SPEC: NORMAL
PATH REPORT.FINAL DX SPEC: NORMAL
PATH REPORT.GROSS SPEC: NORMAL

## 2020-12-31 DIAGNOSIS — K29.30 CHRONIC SUPERFICIAL GASTRITIS WITHOUT BLEEDING: Primary | ICD-10-CM

## 2021-02-05 ENCOUNTER — PATIENT MESSAGE (OUTPATIENT)
Dept: GASTROENTEROLOGY | Facility: CLINIC | Age: 56
End: 2021-02-05

## 2021-03-05 DIAGNOSIS — A04.8 H. PYLORI INFECTION: Primary | ICD-10-CM

## 2021-03-05 RX ORDER — AMOXICILLIN 500 MG/1
1000 CAPSULE ORAL 2 TIMES DAILY
Qty: 56 CAPSULE | Refills: 0 | Status: SHIPPED | OUTPATIENT
Start: 2021-03-05 | End: 2022-03-28

## 2021-03-05 RX ORDER — CLARITHROMYCIN 500 MG/1
500 TABLET, COATED ORAL 2 TIMES DAILY
Qty: 28 TABLET | Refills: 0 | Status: SHIPPED | OUTPATIENT
Start: 2021-03-05 | End: 2022-03-28

## 2021-03-05 NOTE — TELEPHONE ENCOUNTER
So lets go ahead and treat her for H pylori and see if her symptoms improve.  We can send in amox,  And either clarithro or flagy whichever is more affordable for her.  Please put in the orders according to what she prefers.  Thanks

## 2022-03-28 ENCOUNTER — HOSPITAL ENCOUNTER (OUTPATIENT)
Dept: GENERAL RADIOLOGY | Facility: HOSPITAL | Age: 57
Discharge: HOME | End: 2022-03-28

## 2022-03-28 ENCOUNTER — OFFICE VISIT (OUTPATIENT)
Dept: INTERNAL MEDICINE | Facility: CLINIC | Age: 57
End: 2022-03-28

## 2022-03-28 VITALS
OXYGEN SATURATION: 98 % | DIASTOLIC BLOOD PRESSURE: 68 MMHG | SYSTOLIC BLOOD PRESSURE: 124 MMHG | HEIGHT: 62 IN | BODY MASS INDEX: 35.99 KG/M2 | WEIGHT: 195.6 LBS | HEART RATE: 72 BPM

## 2022-03-28 DIAGNOSIS — M54.50 CHRONIC MIDLINE LOW BACK PAIN WITHOUT SCIATICA: ICD-10-CM

## 2022-03-28 DIAGNOSIS — M25.561 CHRONIC PAIN OF BOTH KNEES: Primary | ICD-10-CM

## 2022-03-28 DIAGNOSIS — M25.562 CHRONIC PAIN OF BOTH KNEES: Primary | ICD-10-CM

## 2022-03-28 DIAGNOSIS — Z00.00 ROUTINE HEALTH MAINTENANCE: ICD-10-CM

## 2022-03-28 DIAGNOSIS — G89.29 CHRONIC PAIN OF BOTH KNEES: Primary | ICD-10-CM

## 2022-03-28 DIAGNOSIS — G89.29 CHRONIC MIDLINE LOW BACK PAIN WITHOUT SCIATICA: ICD-10-CM

## 2022-03-28 DIAGNOSIS — E66.09 CLASS 2 OBESITY DUE TO EXCESS CALORIES WITH BODY MASS INDEX (BMI) OF 35.0 TO 35.9 IN ADULT, UNSPECIFIED WHETHER SERIOUS COMORBIDITY PRESENT: ICD-10-CM

## 2022-03-28 DIAGNOSIS — R63.8 UNABLE TO LOSE WEIGHT: ICD-10-CM

## 2022-03-28 PROBLEM — M51.369 DDD (DEGENERATIVE DISC DISEASE), LUMBAR: Chronic | Status: ACTIVE | Noted: 2022-03-28

## 2022-03-28 PROBLEM — M51.36 DDD (DEGENERATIVE DISC DISEASE), LUMBAR: Chronic | Status: ACTIVE | Noted: 2022-03-28

## 2022-03-28 PROBLEM — Z12.11 ENCOUNTER FOR SCREENING FOR MALIGNANT NEOPLASM OF COLON: Status: RESOLVED | Noted: 2020-11-23 | Resolved: 2022-03-28

## 2022-03-28 PROBLEM — M79.7 FIBROMYALGIA: Status: ACTIVE | Noted: 2022-03-28

## 2022-03-28 PROBLEM — M06.9 RHEUMATOID ARTHRITIS: Status: ACTIVE | Noted: 2022-03-28

## 2022-03-28 PROCEDURE — 99204 OFFICE O/P NEW MOD 45 MIN: CPT | Performed by: NURSE PRACTITIONER

## 2022-03-28 PROCEDURE — 73562 X-RAY EXAM OF KNEE 3: CPT

## 2022-03-28 PROCEDURE — 72110 X-RAY EXAM L-2 SPINE 4/>VWS: CPT

## 2022-03-28 NOTE — PROGRESS NOTES
Subjective    Fanny Winchester is a 56 y.o. female presenting today for   Chief Complaint   Patient presents with   • Establish Care   • Pain       History of Present Illness     Fanny Winchester presents today as a new patient to me to establish care.   Prior PCP was Dr. Perez.  Patient Care Team:  Katalina Garcia APRN as PCP - General (Family Medicine)    Current/chronic health conditions include:    Patient Active Problem List   Diagnosis   • Gastroesophageal reflux disease with esophagitis without hemorrhage   • Fibromyalgia   • Rheumatoid arthritis (HCC)   • DDD (degenerative disc disease), lumbar       No outpatient medications have been marked as taking for the 3/28/22 encounter (Office Visit) with Katalina Garcia APRN.       She has h/o RA and Fibromyalgia. She previously saw Dr. Magaña. She has not had Rheum f/u for the past two years d/t lack of insurance. She is uncertain if she plans to return to Rheum f/u.      New complaints today include:  She notes chronic pain in both knees. This has been ongoing for years and has been progressive in the last two years. She describes this as sharp, stabbing, throbbing, aching. Excruciating. Worst 10/10. Currently 5/10. She does not take any OTCs. She reports performing light exercises BID. She has never had PT consultation.    She reports chronic LBP which has been ongoing for many years and is progressively worsening. Aching. Constant. Worse w/ standing and daily movements. Worst 10/10. Currently 2-3/10. No OTCs. She has never had PT consultation.     She notes an inability to lose wgt. She has tried Atkins, Keto, and counting calories (<1200.) She has tried reducing carbs to 25 grams or less/day. She has not had consultation w/ nutrition or worked w/ a .      She works as a  provider which requires standing and lifting.      The following portions of the patient's history were reviewed and updated as appropriate: allergies,  "current medications, problem list, past medical history, past surgical history, family history, and social history.         Objective    Vitals:    03/28/22 0839   BP: 124/68   Pulse: 72   SpO2: 98%   Weight: 88.7 kg (195 lb 9.6 oz)   Height: 157.5 cm (62\")     Body mass index is 35.78 kg/m².  Nursing notes and vitals reviewed.    Physical Exam  Constitutional:       General: She is not in acute distress.     Appearance: She is well-developed.   Pulmonary:      Effort: Pulmonary effort is normal.   Neurological:      Mental Status: She is alert.   Psychiatric:         Attention and Perception: She is attentive.         Speech: Speech normal.         No results found for this or any previous visit (from the past 672 hour(s)).      Assessment and Plan    Diagnoses and all orders for this visit:    1. Chronic pain of both knees (Primary)  -     XR Knee 3 View Bilateral  -     Ambulatory Referral to Physical Therapy Evaluate and treat    2. Chronic midline low back pain without sciatica  -     XR Spine Lumbar 4+ View  -     Ambulatory Referral to Physical Therapy Evaluate and treat    3. Class 2 obesity due to excess calories with body mass index (BMI) of 35.0 to 35.9 in adult, unspecified whether serious comorbidity present  -     CBC (No Diff)  -     Comprehensive Metabolic Panel  -     Hemoglobin A1c  -     Lipid Panel With / Chol / HDL Ratio  -     TSH  -     Urinalysis With Culture If Indicated - Urine, Clean Catch  -     T3, Free  -     T4, Free  -     Thyroid Antibodies    4. Unable to lose weight  -     CBC (No Diff)  -     Comprehensive Metabolic Panel  -     Hemoglobin A1c  -     Lipid Panel With / Chol / HDL Ratio  -     TSH  -     Urinalysis With Culture If Indicated - Urine, Clean Catch  -     T3, Free  -     T4, Free  -     Thyroid Antibodies    5. Routine health maintenance  -     CBC (No Diff)  -     Comprehensive Metabolic Panel  -     Hepatitis C Antibody  -     Hemoglobin A1c  -     Lipid Panel With / " Chol / HDL Ratio  -     TSH  -     Urinalysis With Culture If Indicated - Urine, Clean Catch  -     T3, Free  -     T4, Free  -     Thyroid Antibodies        Provided Contact info for GYN.      The plan of care was discussed. All questions were answered. Patient verbalized understanding.        Return for ASAP, Annual physical.

## 2022-03-30 LAB
ALBUMIN SERPL-MCNC: 4.5 G/DL (ref 3.8–4.9)
ALBUMIN/GLOB SERPL: 1.4 {RATIO} (ref 1.2–2.2)
ALP SERPL-CCNC: 114 IU/L (ref 44–121)
ALT SERPL-CCNC: 28 IU/L (ref 0–32)
APPEARANCE UR: CLEAR
AST SERPL-CCNC: 23 IU/L (ref 0–40)
BACTERIA #/AREA URNS HPF: NORMAL /[HPF]
BILIRUB SERPL-MCNC: 0.3 MG/DL (ref 0–1.2)
BILIRUB UR QL STRIP: NEGATIVE
BUN SERPL-MCNC: 18 MG/DL (ref 6–24)
BUN/CREAT SERPL: 25 (ref 9–23)
CALCIUM SERPL-MCNC: 9.7 MG/DL (ref 8.7–10.2)
CASTS URNS QL MICRO: NORMAL /LPF
CHLORIDE SERPL-SCNC: 104 MMOL/L (ref 96–106)
CHOLEST SERPL-MCNC: 229 MG/DL (ref 100–199)
CHOLEST/HDLC SERPL: 5.2 RATIO (ref 0–4.4)
CO2 SERPL-SCNC: 24 MMOL/L (ref 20–29)
COLOR UR: YELLOW
CREAT SERPL-MCNC: 0.72 MG/DL (ref 0.57–1)
EGFRCR SERPLBLD CKD-EPI 2021: 97 ML/MIN/1.73
EPI CELLS #/AREA URNS HPF: NORMAL /HPF (ref 0–10)
ERYTHROCYTE [DISTWIDTH] IN BLOOD BY AUTOMATED COUNT: 13.1 % (ref 11.7–15.4)
GLOBULIN SER CALC-MCNC: 3.2 G/DL (ref 1.5–4.5)
GLUCOSE SERPL-MCNC: 102 MG/DL (ref 65–99)
GLUCOSE UR QL STRIP: NEGATIVE
HBA1C MFR BLD: 6.2 % (ref 4.8–5.6)
HCT VFR BLD AUTO: 42.7 % (ref 34–46.6)
HCV AB S/CO SERPL IA: <0.1 S/CO RATIO (ref 0–0.9)
HDLC SERPL-MCNC: 44 MG/DL
HGB BLD-MCNC: 13.7 G/DL (ref 11.1–15.9)
HGB UR QL STRIP: NEGATIVE
KETONES UR QL STRIP: NEGATIVE
LDLC SERPL CALC-MCNC: 138 MG/DL (ref 0–99)
LEUKOCYTE ESTERASE UR QL STRIP: NEGATIVE
MCH RBC QN AUTO: 26 PG (ref 26.6–33)
MCHC RBC AUTO-ENTMCNC: 32.1 G/DL (ref 31.5–35.7)
MCV RBC AUTO: 81 FL (ref 79–97)
MICRO URNS: NORMAL
MICRO URNS: NORMAL
NITRITE UR QL STRIP: NEGATIVE
PH UR STRIP: 7 [PH] (ref 5–7.5)
PLATELET # BLD AUTO: 313 X10E3/UL (ref 150–450)
POTASSIUM SERPL-SCNC: 5.3 MMOL/L (ref 3.5–5.2)
PROT SERPL-MCNC: 7.7 G/DL (ref 6–8.5)
PROT UR QL STRIP: NEGATIVE
RBC # BLD AUTO: 5.26 X10E6/UL (ref 3.77–5.28)
RBC #/AREA URNS HPF: NORMAL /HPF (ref 0–2)
SODIUM SERPL-SCNC: 142 MMOL/L (ref 134–144)
SP GR UR STRIP: 1.01 (ref 1–1.03)
T3FREE SERPL-MCNC: 3 PG/ML (ref 2–4.4)
T4 FREE SERPL-MCNC: 1.24 NG/DL (ref 0.82–1.77)
THYROGLOB AB SERPL-ACNC: 54.2 IU/ML (ref 0–0.9)
THYROPEROXIDASE AB SERPL-ACNC: 22 IU/ML (ref 0–34)
TRIGL SERPL-MCNC: 260 MG/DL (ref 0–149)
TSH SERPL DL<=0.005 MIU/L-ACNC: 2.1 UIU/ML (ref 0.45–4.5)
URINALYSIS REFLEX: NORMAL
UROBILINOGEN UR STRIP-MCNC: 0.2 MG/DL (ref 0.2–1)
VLDLC SERPL CALC-MCNC: 47 MG/DL (ref 5–40)
WBC # BLD AUTO: 4.9 X10E3/UL (ref 3.4–10.8)
WBC #/AREA URNS HPF: NORMAL /HPF (ref 0–5)

## 2022-04-11 ENCOUNTER — PATIENT ROUNDING (BHMG ONLY) (OUTPATIENT)
Dept: INTERNAL MEDICINE | Facility: CLINIC | Age: 57
End: 2022-04-11

## 2022-04-11 ENCOUNTER — HOSPITAL ENCOUNTER (OUTPATIENT)
Dept: PHYSICAL THERAPY | Facility: HOSPITAL | Age: 57
Setting detail: THERAPIES SERIES
Discharge: HOME | End: 2022-04-11

## 2022-04-11 DIAGNOSIS — G89.29 CHRONIC PAIN OF BOTH KNEES: Primary | ICD-10-CM

## 2022-04-11 DIAGNOSIS — G89.29 CHRONIC MIDLINE LOW BACK PAIN WITHOUT SCIATICA: ICD-10-CM

## 2022-04-11 DIAGNOSIS — M25.561 CHRONIC PAIN OF BOTH KNEES: Primary | ICD-10-CM

## 2022-04-11 DIAGNOSIS — M54.50 CHRONIC MIDLINE LOW BACK PAIN WITHOUT SCIATICA: ICD-10-CM

## 2022-04-11 DIAGNOSIS — M25.562 CHRONIC PAIN OF BOTH KNEES: Primary | ICD-10-CM

## 2022-04-11 PROCEDURE — 97161 PT EVAL LOW COMPLEX 20 MIN: CPT | Performed by: PHYSICAL THERAPIST

## 2022-04-11 NOTE — PROGRESS NOTES
My name is Ligia Brand and I am the Referral clerk at [facility].     I would like  to officially welcome you to our practice and ask about your recent visit.     Tell me about your visit with us. What things went well?        We're always looking for ways to make our patients' experiences even better. Do you have recommendations on ways we may improve?      Overall were you satisfied with your first visit to our practice?        I appreciate you taking the time to answer these questions. Is there anything else I can do for you?       Thank you, and have a great day.     Ligia

## 2022-04-11 NOTE — THERAPY EVALUATION
Outpatient Physical Therapy Ortho Initial Evaluation   Malia Hutchison     Patient Name: Fanny Winchester  : 1965  MRN: [id number]  Today's Date: 2022      Visit Date: 2022    Patient Active Problem List   Diagnosis   • Gastroesophageal reflux disease with esophagitis without hemorrhage   • Fibromyalgia   • Rheumatoid arthritis (HCC)   • DDD (degenerative disc disease), lumbar        Past Medical History:   Diagnosis Date   • Anxiety    • DDD (degenerative disc disease), lumbar 3/28/2022   • Fibromyalgia    • Osteoarthritis    • Rheumatoid arthritis involving multiple sites (HCC)         Past Surgical History:   Procedure Laterality Date   • COLONOSCOPY N/A 2020    Procedure: COLONOSCOPY with polypectomy;  Surgeon: Adarsh Arredondo MD;  Location: [facility] OR;  Service: Gastroenterology;  Laterality: N/A;  Rectal polyp   • ENDOSCOPY N/A 2020    Procedure: ESOPHAGOGASTRODUODENOSCOPY with biopsies;  Surgeon: Adarsh Arredondo MD;  Location:  [facility] OR;  Service: Gastroenterology;  Laterality: N/A;  Reflux esophagitis  Gastritis  Duodenitis  Hiatal hernia  Biopsies: distal esophagus, gastric, duodenum   • GANGLION CYST EXCISION Left 2019   • HYSTERECTOMY  2000   • OOPHORECTOMY Right 2020   • ULNAR NERVE TRANSPOSITION Left 2019       Visit Dx:     ICD-10-CM ICD-9-CM   1. Chronic pain of both knees  M25.561 719.46    M25.562 338.29    G89.29    2. Chronic midline low back pain without sciatica  M54.50 724.2    G89.29 338.29          Patient History     Row Name 22 0600             History    Chief Complaint Difficulty Walking;Difficulty with daily activities;Fatigue/poor endurance;Joint stiffness;Joint swelling;Muscle tenderness;Muscle weakness;Pain  -GC      Type of Pain Back pain;Knee pain  -GC      Other Type of Pain Pt reports a several whit history of knee pan and LBP. She states they both have increased in intensity recently. She was seen by Katalina Garcia  AMANDA, who ordered x-rays and referred her to therapy.  -GC      Brief Description of Current Complaint Pain in knees and lower back limits my mobilty.  -GC      Patient/Caregiver Goals Relieve pain;Return to prior level of function;Improve mobility;Improve strength;Know what to do to help the symptoms  -GC      Hand Dominance right-handed  -GC      Occupation/sports/leisure activities Childcare provider, walking/ hiking, swimming, yard work.  -GC      Patient seeing anyone else for problem(s)? Not at this time.  -GC      What clinical tests have you had for this problem? X-ray  -GC      Results of Clinical Tests OA knees, mild degenerative changes of lumbar spine with grade I spondylolisthesis  -GC      Are you or can you be pregnant No  -GC              Pain     Pain Location Back;Knee  -GC      Pain at Present 4  -GC      Pain at Best 4  -GC      Pain at Worst 8  -GC      Pain Frequency Constant/continuous  -GC      Pain Description Aching;Discomfort;Sore  -GC      What Performance Factors Make the Current Problem(s) WORSE? Pt c/o back pain with standing, bending, nad lifting and knee pain with walking, squatting, stairs  -GC      What Performance Factors Make the Current Problem(s) BETTER? Pt feels best at rest  -GC      Difficulties at work? Pt has pain with bending to take care of the kids, lifting tasks  -GC      Difficulties with ADL's? Pt has difficulty with standing, walking, stairs  -GC              Fall Risk Assessment    Any falls in the past year: No  -GC              Services    Prior Rehab/Home Health Experiences No  -GC      Are you currently receiving Home Health services No  -GC      Do you plan to receive Home Health services in the near future No  -GC              Daily Activities    Primary Language English  -GC      Are you able to read Yes  -GC      Are you able to write Yes  -GC      How does patient learn best? Listening  -GC      Teaching needs identified Home Exercise Program;Management of  Condition  -GC      Patient is concerned about/has problems with Climbing Stairs;Difficulty with self care (i.e. bathing, dressing, toileting:;Flexibility;Performing home management (household chores, shopping, care of dependents);Performing job responsibilities/community activities (work, school,;Performing sports, recreation, and play activities;Standing;Walking  -GC      Does patient have problems with the following? Depression;Anxiety;Panic Attack;Other (comment)  emotional problems  -GC      Barriers to learning None  -GC      Functional Status mobility issues preventing performance of daily activities  -      Pt Participated in POC and Goals Yes  -GC              Safety    Are you being hurt, hit, or frightened by anyone at home or in your life? No  -GC      Are you being neglected by a caregiver No  -GC      Have you had any of the following issues with Depression;Anxiety;Panic Attacks;Neglect/Abuse  -            User Key  (r) = Recorded By, (t) = Taken By, (c) = Cosigned By    Initials Name Provider Type    GC Tesfaye Bellamy, PT Physical Therapist                 PT Ortho     Row Name 04/11/22 0600       Posture/Observations    Posture/Observations Comments --  -GC       Lumbosacral Accessory Motions    PA [place]- L1 WNL  -GC    PA [place]- L2 WNL  -GC    PA [place]- L3 Hypomobile  -GC    PA Glide- L4 Hypomobile  -GC    PA Glide- L5 Hypomobile  -GC    PA glide- Sacral base Hypomobile  -GC       Lumbar/SI Special Tests    Stork Test (SI Dysfunction) Left:;Positive  -GC    Slump Test (Neural Tension) Bilateral:;Negative  -    SLR (Neural Tension) Bilateral:;Negative  -    MARI (hip vs. SI Dysfunction) Left:;Positive;Right:;Negative  -GC       Lumbosacral Palpation    SI Bilateral:;Tender  left on right posterior sacral torsion noted  -    Lumbosacral Segment Tender  L4-S1, FRS rigth at L5/S1  -    Quadratus Lumborum Bilateral:;Tender;Guarded/taut  -    Erector Spinae (Paraspinals)  Bilateral:;Tender;Guarded/taut  -GC       Knee Palpation    Medial Joint Line Bilateral:;Tender  -GC       Patellar Accessory Motions    Superior glide Right:;Left:;WNL  -GC    Inferior glide Right:;Left:;WNL  -GC    Medial glide Right:;Left:;WNL  -GC    Lateral glide Right:;Left:;WNL  -GC       Knee Special Tests    Lachman’s (ACL lesion) Bilateral:;Negative  -GC    Valgus stress (MCL lesion) Bilateral:;Negative  -GC    Varus stress (LCL lesion) Bilateral:;Negative  -GC    Patellar grind test (chondromalacia patella) Bilateral:;Positive  -GC       Head/Neck/Trunk    Trunk Extension AROM 50% range with pain  -GC    Trunk Flexion AROM 75% range  -GC    Trunk Lt Lateral Flexion AROM 50% range with pain  -GC    Trunk Rt Lateral Flexion AROM 50% range  -GC    Trunk Lt Rotation AROM 75% range with pain  -GC    Trunk Rt Rotation AROM 75% range  -GC       Right Lower Ext    Rt Knee Extension/Flexion AROM 0-6-130  -GC       Left Lower Ext    Lt Knee Extension/Flexion AROM 0-7-132  -GC       MMT Neck/Trunk    Trunk Flexion MMT, Gross Movement (4/5) good  -GC    Trunk Extension MMT, Gross Movement (4/5) good  -GC       MMT Right Lower Ext    Rt Hip Flexion MMT, Gross Movement (4/5) good  -GC    Rt Hip Extension MMT, Gross Movement (4+/5) good plus  -GC    Rt Hip ABduction MMT, Gross Movement (4+/5) good plus  -GC    Rt Hip ADduction MMT, Gross Movement (4+/5) good plus  -GC    Rt Knee Extension MMT, Gross Movement (4/5) good  -GC    Rt Knee Flexion MMT, Gross Movement (4+/5) good plus  -GC       MMT Left Lower Ext    Lt Hip Flexion MMT, Gross Movement (4/5) good  -GC    Lt Hip Extension MMT, Gross Movement (5/5) normal  -GC    Lt Hip ABduction MMT, Gross Movement (5/5) normal  -GC    Lt Hip ADduction MMT, Gross Movement (5/5) normal  -GC    Lt Knee Extension MMT, Gross Movement (4/5) good  -GC    Lt Knee Flexion MMT, Gross Movement (4+/5) good plus  -GC       Sensation    Light Touch No apparent deficits  -GC       Lower  Extremity Flexibility    Hamstrings Bilateral:;Moderately limited  -GC    Hip Flexors Bilateral:;Mildly limited  -GC    Quadriceps Bilateral:;WNL  -GC    ITB Bilateral:;WNL  -GC    Hip External Rotators Bilateral:;Moderately limited  -GC    Hip Internal Rotators Bilateral:;Mildly limited  -GC    Quadratus Lumborum Bilateral:;Moderately limited  -GC       Transfers    Comment, (Transfers) Pt has pain with going sit to/from supine and rolling supine to/from prone  -GC       Gait/Stairs (Locomotion)    Comment, (Gait/Stairs) Pt ambulates wiht mild antalgic gait  -GC          User Key  (r) = Recorded By, (t) = Taken By, (c) = Cosigned By    Initials Name Provider Type    GC Tesfaye Bellamy PT Physical Therapist                            Therapy Education  Given: HEP, Symptoms/condition management, Pain management, Posture/body mechanics  Program: New  How Provided: Verbal, Demonstration, Written  Provided to: Patient  Level of Understanding: Teach back education performed, Verbalized, Demonstrated      PT OP Goals     Row Name 04/11/22 0600          PT Short Term Goals    STG Date to Achieve 04/25/22  -     STG 1 Decrease knee and back pain to 3-4/10 with activity.  -     STG 2 Increase trunk ROM to at least 75% range all planes with testing.  -     STG 3 Increase bilateral knee EXT ROM to 0 degrees with testing.  -     STG 4 Increase core strength and LE strength to at least 4+/5 all planes with testing.  -     STG 5 Increase hamstring, piriformis, and quadratus flexibility to no more than a minimal restriciton wtih testing.  -     STG 6 Pt will be independent with her HEP issued by this therapist.  -            Long Term Goals    LTG Date to Achieve 05/09/22  -     LTG 1 Decrease knee and back pain to 0-1/10 with activity.  -     LTG 2 Increase trunk ROM to WFL all planes with testing.  -     LTG 3 Increase core strength and LE strength to 5/5 all planes with testing.  -     LTG 4 Increase  hamstring, piriformis, and quadratus flexibility to [facility] wtih testing.  -GC     LTG 5 Pt will ambulate normally on levels and stairs.  -GC     LTG 6 Pt will be independent with all ADLs without pain.  -            Time Calculation    PT Goal Re-Cert Due Date 05/09/22  -GC           User Key  (r) = Recorded By, (t) = Taken By, (c) = Cosigned By    Initials Name Provider Type     Tesfaye Bellamy, PT Physical Therapist                 PT Assessment/Plan     Row Name 04/11/22 0600          PT Assessment    Functional Limitations Impaired gait;Limitation in home management;Limitations in community activities;Limitations in functional capacity and performance;Performance in leisure activities;Performance in self-care ADL;Performance in work activities  -     Impairments Gait;Impaired flexibility;Range of motion;Pain;Muscle strength;Joint mobility  -     Assessment Comments Pt pressents with a long history of bilateral kne epan and LBP that has increased in intensity over the last several months. She rates her pain up to 8/10 with activities such as standing, walking, squating, bending, and lifting. She has decreased trunk ROM, decreased core strength, decreased spinal mobility, decreased core and LE flexibility, decreased LE strength, decreased knee extension ROM, decreased ambulatory status, and decreased function secondary to the above.  -     Please refer to paper survey for additional self-reported information Yes  -GC     Rehab Potential Good  -GC     Patient/caregiver participated in establishment of treatment plan and goals Yes  -GC     Patient would benefit from skilled therapy intervention Yes  -GC            PT Plan    PT Frequency 1x/week;2x/week  -GC     Predicted Duration of Therapy Intervention (PT) 4 weeks  -GC     Planned CPT's? PT EVAL LOW COMPLEXITY: 21601;PT THER PROC EA 15 MIN: 69044;PT MANUAL THERAPY EA 15 MIN: 97149;PT HOT OR COLD PACK TREAT MCARE;PT ELECTRICAL STIM UNATTEND:   -      PT Plan Comments Pt is to continue her HEP 2x daily.  -GC           User Key  (r) = Recorded By, (t) = Taken By, (c) = Cosigned By    Initials Name Provider Type    Tesfaye Mejias PT Physical Therapist                   OP Exercises     Row Name 04/11/22 0600             Exercise 1    Exercise Name 1 Hamstring stretch-bilateral  -GC      Cueing 1 Verbal;Tactile  -GC      Reps 1 10  -GC      Time 1 10 secs  -GC              Exercise 2    Exercise Name 2 Piriformis stretch-bilateral  -GC      Cueing 2 Verbal;Tactile  -GC      Reps 2 10  -GC      Time 2 10 secs  -GC              Exercise 3    Exercise Name 3 MET for FRS right at L5/S1 and left on right posterior sacral torsion  -GC      Cueing 3 Verbal;Tactile  -GC      Time 3 3 min  -GC              Exercise 4    Exercise Name 4 Unilateral press ups on left  -GC      Cueing 4 Verbal;Demo  -GC      Reps 4 15  -GC              Exercise 5    Exercise Name 5 SLR-bialteral  -GC      Cueing 5 Verbal;Tactile  -GC      Reps 5 25  -GC              Exercise 6    Exercise Name 6 SAQ-bilateral  -GC      Cueing 6 Verbal;Tactile  -GC      Reps 6 25  -GC              Exercise 7    Exercise Name 7 LAQ/ball squeeze  -GC      Cueing 7 Verbal;Tactile  -GC      Reps 7 25  -GC              Exercise 8    Exercise Name 8 TKE vs theraband-bilateral  -GC      Cueing 8 Verbal;Demo  -GC      Reps 8 25  -GC      Time 8 black  -GC            User Key  (r) = Recorded By, (t) = Taken By, (c) = Cosigned By    Initials Name Provider Type    Tesfaye Mejias PT Physical Therapist                              Outcome Measure Options: Lower Extremity Functional Scale (LEFS)  Lower Extremity Functional Index  Your usual hobbies, recreational or sporting activities: Quite a bit of difficulty  Getting into or out of the bath: Moderate difficulty  Walking between rooms: A little bit of difficulty  Putting on your shoes or socks: A little bit of difficulty  Squatting: Quite a bit of difficulty  Lifting  an object, like a bag of groceries from the floor: A little bit of difficulty  Performing light activities around your home: A little bit of difficulty  Performing heavy activities around your home: Quite a bit of difficulty  Getting into or out of a car: Moderate difficulty  Walking 2 blocks: Quite a bit of difficulty  Walking a mile: Extreme difficulty or unable to perform activity  Going up or down 10 stairs (about 1 flight of stairs): Quite a bit of difficulty  Standing for 1 hour: Extreme difficulty or unable to perform activity  Sitting for 1 hour: No difficulty  Running on even ground: Extreme difficulty or unable to perform activity  Running on uneven ground: Extreme difficulty or unable to perform activity  Making sharp turns while running fast: Extreme difficulty or unable to perform activity  Hopping: Extreme difficulty or unable to perform activity  Rolling over in bed: A little bit of difficulty      Time Calculation:     Start Time: 0600  Stop Time: 0718  Time Calculation (min): 78 min     Therapy Charges for Today     Code Description Service Date Service Provider Modifiers Qty    85941746420 HC PT EVAL LOW COMPLEXITY 3 4/11/2022 Tesfaye Bellamy, PT GP 1          PT G-Codes  Outcome Measure Options: Lower Extremity Functional Scale (LEFS)         Tesfaye Bellamy PT  4/11/2022

## 2022-04-18 ENCOUNTER — HOSPITAL ENCOUNTER (OUTPATIENT)
Dept: PHYSICAL THERAPY | Facility: HOSPITAL | Age: 57
Setting detail: THERAPIES SERIES
Discharge: HOME | End: 2022-04-18

## 2022-04-18 DIAGNOSIS — G89.29 CHRONIC MIDLINE LOW BACK PAIN WITHOUT SCIATICA: ICD-10-CM

## 2022-04-18 DIAGNOSIS — M25.562 CHRONIC PAIN OF BOTH KNEES: Primary | ICD-10-CM

## 2022-04-18 DIAGNOSIS — M54.50 CHRONIC MIDLINE LOW BACK PAIN WITHOUT SCIATICA: ICD-10-CM

## 2022-04-18 DIAGNOSIS — M25.561 CHRONIC PAIN OF BOTH KNEES: Primary | ICD-10-CM

## 2022-04-18 DIAGNOSIS — G89.29 CHRONIC PAIN OF BOTH KNEES: Primary | ICD-10-CM

## 2022-04-18 PROCEDURE — 97110 THERAPEUTIC EXERCISES: CPT | Performed by: PHYSICAL THERAPIST

## 2022-04-18 NOTE — THERAPY TREATMENT NOTE
Outpatient Physical Therapy Ortho Treatment Note   Malia Hutchison     Patient Name: Fanny Winchester  : 1965  MRN: [id number]  Today's Date: 2022      Visit Date: 2022    Visit Dx:    ICD-10-CM ICD-9-CM   1. Chronic pain of both knees  M25.561 719.46    M25.562 338.29    G89.29    2. Chronic midline low back pain without sciatica  M54.50 724.2    G89.29 338.29       Patient Active Problem List   Diagnosis   • Gastroesophageal reflux disease with esophagitis without hemorrhage   • Fibromyalgia   • Rheumatoid arthritis (HCC)   • DDD (degenerative disc disease), lumbar        Past Medical History:   Diagnosis Date   • Anxiety    • DDD (degenerative disc disease), lumbar 3/28/2022   • Fibromyalgia    • Osteoarthritis    • Rheumatoid arthritis involving multiple sites (HCC)         Past Surgical History:   Procedure Laterality Date   • COLONOSCOPY N/A 2020    Procedure: COLONOSCOPY with polypectomy;  Surgeon: Adarsh Arredondo MD;  Location: [facility];  Service: Gastroenterology;  Laterality: N/A;  Rectal polyp   • ENDOSCOPY N/A 2020    Procedure: ESOPHAGOGASTRODUODENOSCOPY with biopsies;  Surgeon: Adarsh Arredondo MD;  Location: [facility] OR;  Service: Gastroenterology;  Laterality: N/A;  Reflux esophagitis  Gastritis  Duodenitis  Hiatal hernia  Biopsies: distal esophagus, gastric, duodenum   • GANGLION CYST EXCISION Left 2019   • HYSTERECTOMY     • OOPHORECTOMY Right 2020   • ULNAR NERVE TRANSPOSITION Left 2019                        PT Assessment/Plan     Row Name 22 0600          PT Assessment    Assessment Comments Pt is doing well with decreased c/o pain and good tolerance to her exercise progression.  -            PT Plan    PT Plan Comments Pt is to continue her HEP daily. Will re-ck again in one week.  -           User Key  (r) = Recorded By, (t) = Taken By, (c) = Cosigned By    Initials Name Provider Type    Tesfaye Mejias, PT Physical  Therapist                   OP Exercises     Row Name 04/18/22 0600             Subjective Comments    Subjective Comments Pt states she is feeling pretty good this morning. She denies any real pain with her exercises.  -GC              Exercise 1    Exercise Name 1 Hamstring stretch-bilateral  -GC      Cueing 1 Verbal;Tactile  -GC      Reps 1 10  -GC      Time 1 10 secs  -GC              Exercise 2    Exercise Name 2 Piriformis stretch-bilateral  -GC      Cueing 2 Verbal;Tactile  -GC      Reps 2 10  -GC      Time 2 10 secs  -GC              Exercise 3    Exercise Name 3 MET for FRS right at L5/S1 and left on right posterior sacral torsion  -GC      Cueing 3 Verbal;Tactile  -GC      Time 3 3 min  -GC              Exercise 4    Exercise Name 4 Unilateral press ups on left  -GC      Cueing 4 Verbal;Demo  -GC      Reps 4 15  -GC              Exercise 5    Exercise Name 5 SLR-bialteral  -GC      Cueing 5 Verbal;Tactile  -GC      Reps 5 25  -GC      Time 5 1#  -GC              Exercise 6    Exercise Name 6 SAQ-bilateral  -GC      Cueing 6 Verbal;Tactile  -GC      Reps 6 25  -GC      Time 6 1#  -GC              Exercise 7    Exercise Name 7 LAQ/ball squeeze  -GC      Cueing 7 Verbal;Tactile  -GC      Reps 7 25  -GC      Time 7 1#  -GC              Exercise 8    Exercise Name 8 TKE vs theraband-bilateral  -GC      Cueing 8 Verbal;Demo  -GC      Reps 8 25  -GC      Time 8 silver  -GC              Exercise 9    Exercise Name 9 AP mobilizations L3-5  -GC      Cueing 9 Verbal;Tactile  -GC      Time 9 3 min  -GC              Exercise 10    Exercise Name 10 Press ups with overpressure at L5 and S1  -GC      Cueing 10 Verbal;Tactile  -GC      Reps 10 5x ea level  -GC            User Key  (r) = Recorded By, (t) = Taken By, (c) = Cosigned By    Initials Name Provider Type    Tesfaye Mejias PT Physical Therapist                                                Time Calculation:   Start Time: 0600  Stop Time: 0657  Time Calculation  (min): 57 min  Therapy Charges for Today     Code Description Service Date Service Provider Modifiers Qty    14863474677 HC PT THER PROC EA 15 MIN 4/18/2022 Tesfaye Bellamy, PT GP 2                    Tesfaye Bellamy, PT  4/18/2022

## 2022-04-25 ENCOUNTER — HOSPITAL ENCOUNTER (OUTPATIENT)
Dept: PHYSICAL THERAPY | Facility: HOSPITAL | Age: 57
Setting detail: THERAPIES SERIES
Discharge: HOME | End: 2022-04-25

## 2022-04-25 DIAGNOSIS — M25.562 CHRONIC PAIN OF BOTH KNEES: Primary | ICD-10-CM

## 2022-04-25 DIAGNOSIS — G89.29 CHRONIC PAIN OF BOTH KNEES: Primary | ICD-10-CM

## 2022-04-25 DIAGNOSIS — M25.561 CHRONIC PAIN OF BOTH KNEES: Primary | ICD-10-CM

## 2022-04-25 DIAGNOSIS — G89.29 CHRONIC MIDLINE LOW BACK PAIN WITHOUT SCIATICA: ICD-10-CM

## 2022-04-25 DIAGNOSIS — M54.50 CHRONIC MIDLINE LOW BACK PAIN WITHOUT SCIATICA: ICD-10-CM

## 2022-04-25 PROCEDURE — 97110 THERAPEUTIC EXERCISES: CPT | Performed by: PHYSICAL THERAPIST

## 2022-04-25 NOTE — THERAPY TREATMENT NOTE
Outpatient Physical Therapy Ortho Treatment Note   Malia Hutchison     Patient Name: Fanny Winchester  : 1965  MRN: [id number]  Today's Date: 2022      Visit Date: 2022    Visit Dx:    ICD-10-CM ICD-9-CM   1. Chronic pain of both knees  M25.561 719.46    M25.562 338.29    G89.29    2. Chronic midline low back pain without sciatica  M54.50 724.2    G89.29 338.29       Patient Active Problem List   Diagnosis   • Gastroesophageal reflux disease with esophagitis without hemorrhage   • Fibromyalgia   • Rheumatoid arthritis (HCC)   • DDD (degenerative disc disease), lumbar        Past Medical History:   Diagnosis Date   • Anxiety    • DDD (degenerative disc disease), lumbar 3/28/2022   • Fibromyalgia    • Osteoarthritis    • Rheumatoid arthritis involving multiple sites (HCC)         Past Surgical History:   Procedure Laterality Date   • COLONOSCOPY N/A 2020    Procedure: COLONOSCOPY with polypectomy;  Surgeon: Adarsh Arredondo MD;  Location: [facility];  Service: Gastroenterology;  Laterality: N/A;  Rectal polyp   • ENDOSCOPY N/A 2020    Procedure: ESOPHAGOGASTRODUODENOSCOPY with biopsies;  Surgeon: Adarsh Arredondo MD;  Location: [facility] OR;  Service: Gastroenterology;  Laterality: N/A;  Reflux esophagitis  Gastritis  Duodenitis  Hiatal hernia  Biopsies: distal esophagus, gastric, duodenum   • GANGLION CYST EXCISION Left 2019   • HYSTERECTOMY     • OOPHORECTOMY Right 2020   • ULNAR NERVE TRANSPOSITION Left 2019                        PT Assessment/Plan     Row Name 22 0600          PT Assessment    Assessment Comments Pt tolerated her exercise progression well. She is reporting improved functional mobility.  -GC            PT Plan    PT Plan Comments Pt is to continue her HEP daily. Will re-ck again next week.  -GC           User Key  (r) = Recorded By, (t) = Taken By, (c) = Cosigned By    Initials Name Provider Type    Tesfaye Mejias, PT Physical  Therapist                   OP Exercises     Row Name 04/25/22 0600             Subjective Comments    Subjective Comments Pt states she feels some soreness, but overall she is feeling better.  -GC              Exercise 1    Exercise Name 1 Hamstring stretch-bilateral  -GC      Cueing 1 Verbal;Tactile  -GC      Reps 1 10  -GC      Time 1 10 secs  -GC              Exercise 2    Exercise Name 2 Piriformis stretch-bilateral  -GC      Cueing 2 Verbal;Tactile  -GC      Reps 2 10  -GC      Time 2 10 secs  -GC              Exercise 3    Exercise Name 3 MET for FRS right at L5/S1 and left on right posterior sacral torsion  -GC      Cueing 3 Verbal;Tactile  -GC      Time 3 3 min  -GC              Exercise 4    Exercise Name 4 Unilateral press ups on left  -GC      Cueing 4 Verbal;Demo  -GC      Reps 4 15  -GC              Exercise 5    Exercise Name 5 SLR-bialteral  -GC      Cueing 5 Verbal;Tactile  -GC      Reps 5 25  -GC      Time 5 2#  -GC              Exercise 6    Exercise Name 6 SAQ-bilateral  -GC      Cueing 6 Verbal;Tactile  -GC      Reps 6 25  -GC      Time 6 2#  -GC              Exercise 7    Exercise Name 7 LAQ/ball squeeze  -GC      Cueing 7 Verbal;Tactile  -GC      Reps 7 25  -GC      Time 7 2#  -GC              Exercise 8    Exercise Name 8 TKE vs theraband-bilateral  -GC      Cueing 8 Verbal;Demo  -GC      Reps 8 25  -GC      Time 8 gold  -GC              Exercise 9    Exercise Name 9 AP mobilizations L3-5  -GC      Cueing 9 Verbal;Tactile  -GC      Time 9 3 min  -GC              Exercise 10    Exercise Name 10 Press ups with overpressure at L5 and S1  -GC      Cueing 10 Verbal;Tactile  -GC      Reps 10 5x ea level  -GC            User Key  (r) = Recorded By, (t) = Taken By, (c) = Cosigned By    Initials Name Provider Type    Tesfaye Mejias PT Physical Therapist                                                Time Calculation:   Start Time: 0600  Stop Time: 0656  Time Calculation (min): 56 min  Therapy  Charges for Today     Code Description Service Date Service Provider Modifiers Qty    49794810206 HC PT THER PROC EA 15 MIN 4/25/2022 Tesfaye Bellamy, PT GP 2                    Tesfaye Bellamy, PT  4/25/2022

## 2022-05-02 ENCOUNTER — HOSPITAL ENCOUNTER (OUTPATIENT)
Dept: PHYSICAL THERAPY | Facility: HOSPITAL | Age: 57
Setting detail: THERAPIES SERIES
Discharge: HOME | End: 2022-05-02

## 2022-05-02 ENCOUNTER — OFFICE VISIT (OUTPATIENT)
Dept: INTERNAL MEDICINE | Facility: CLINIC | Age: 57
End: 2022-05-02

## 2022-05-02 VITALS
TEMPERATURE: 96.6 F | SYSTOLIC BLOOD PRESSURE: 114 MMHG | WEIGHT: 197.8 LBS | HEART RATE: 70 BPM | BODY MASS INDEX: 36.4 KG/M2 | HEIGHT: 62 IN | OXYGEN SATURATION: 99 % | RESPIRATION RATE: 18 BRPM | DIASTOLIC BLOOD PRESSURE: 66 MMHG

## 2022-05-02 DIAGNOSIS — R73.01 ELEVATED FASTING GLUCOSE: ICD-10-CM

## 2022-05-02 DIAGNOSIS — M51.36 DDD (DEGENERATIVE DISC DISEASE), LUMBAR: ICD-10-CM

## 2022-05-02 DIAGNOSIS — M17.0 PRIMARY OSTEOARTHRITIS OF BOTH KNEES: ICD-10-CM

## 2022-05-02 DIAGNOSIS — M54.50 CHRONIC MIDLINE LOW BACK PAIN WITHOUT SCIATICA: ICD-10-CM

## 2022-05-02 DIAGNOSIS — G89.29 CHRONIC MIDLINE LOW BACK PAIN WITHOUT SCIATICA: ICD-10-CM

## 2022-05-02 DIAGNOSIS — E78.2 MIXED HYPERLIPIDEMIA: ICD-10-CM

## 2022-05-02 DIAGNOSIS — H61.21 IMPACTED CERUMEN OF RIGHT EAR: ICD-10-CM

## 2022-05-02 DIAGNOSIS — Z00.00 ENCOUNTER FOR WELLNESS EXAMINATION IN ADULT: Primary | ICD-10-CM

## 2022-05-02 DIAGNOSIS — M25.561 CHRONIC PAIN OF BOTH KNEES: Primary | ICD-10-CM

## 2022-05-02 DIAGNOSIS — M25.562 CHRONIC PAIN OF BOTH KNEES: Primary | ICD-10-CM

## 2022-05-02 DIAGNOSIS — G89.29 CHRONIC PAIN OF BOTH KNEES: Primary | ICD-10-CM

## 2022-05-02 PROCEDURE — 99396 PREV VISIT EST AGE 40-64: CPT | Performed by: NURSE PRACTITIONER

## 2022-05-02 PROCEDURE — 97110 THERAPEUTIC EXERCISES: CPT | Performed by: PHYSICAL THERAPIST

## 2022-05-02 NOTE — PROGRESS NOTES
"FREDRICK Escobedo is a 56 y.o. female presenting for Annual Exam    Her current/chronic health conditions include:  Patient Active Problem List   Diagnosis   • Gastroesophageal reflux disease with esophagitis without hemorrhage   • Fibromyalgia   • Rheumatoid arthritis (HCC)   • DDD (degenerative disc disease), lumbar   • Primary osteoarthritis of both knees   • Elevated fasting glucose   • Mixed hyperlipidemia       No outpatient medications have been marked as taking for the 5/2/22 encounter (Office Visit) with Katalina Garcia APRN.     Refuses COVID vaccine.    Health Habits:  Nutrition: working to decrease carbs  Exercise: she is working to increase this    Screenings:  PAP: has upcoming appt w/ GYN  Mammogram: has upcoming appt w/ GYN  Colon Cancer: CLS in 2020 w/ 5 yr recall      Complaints today include:  OA - bilat knees. Has just started working on this w/ PT. Has tried OTCs NSAIDs and Meloxicam w/o relief.    DDD - lumbar spine. Really improving w/ PT.      The following portions of the patient's history were reviewed and updated as appropriate: allergies, current medications, problem list, past medical history, past family history, and past social history.        OBJECTIVE    Vitals:    05/02/22 0827   BP: 114/66   BP Location: Left arm   Patient Position: Sitting   Cuff Size: Adult   Pulse: 70   Resp: 18   Temp: 96.6 °F (35.9 °C)   TempSrc: Tympanic   SpO2: 99%   Weight: 89.7 kg (197 lb 12.8 oz)   Height: 157.5 cm (62\")       BP Readings from Last 3 Encounters:   05/02/22 114/66   03/28/22 124/68   12/22/20 119/72        Wt Readings from Last 3 Encounters:   05/02/22 89.7 kg (197 lb 12.8 oz)   03/28/22 88.7 kg (195 lb 9.6 oz)   12/22/20 77.4 kg (170 lb 9.6 oz)        Body mass index is 36.18 kg/m².  Nursing notes and vital signs reviewed.      Physical Exam  Constitutional:       General: She is not in acute distress.     Appearance: She is well-developed. She is obese.   HENT:      Right Ear: " External ear normal. There is impacted cerumen.      Left Ear: Tympanic membrane, ear canal and external ear normal.      Nose: Nose normal.      Mouth/Throat:      Mouth: Mucous membranes are moist.      Pharynx: Oropharynx is clear. Uvula midline.   Neck:      Thyroid: No thyroid mass or thyromegaly.   Cardiovascular:      Rate and Rhythm: Regular rhythm.      Pulses: Normal pulses.      Heart sounds: S1 normal and S2 normal. No murmur heard.    No friction rub. No gallop.   Pulmonary:      Effort: Pulmonary effort is normal.      Breath sounds: Normal breath sounds. No wheezing, rhonchi or rales.   Abdominal:      Comments: Limited by habitus.   Musculoskeletal:      Cervical back: Neck supple.   Lymphadenopathy:      Cervical: No cervical adenopathy.   Neurological:      Mental Status: She is alert and oriented to person, place, and time.      Cranial Nerves: No cranial nerve deficit.      Sensory: No sensory deficit.   Psychiatric:         Attention and Perception: She is attentive.         Speech: Speech normal.         Behavior: Behavior normal.         No results found for this or any previous visit (from the past 672 hour(s)).      ASSESSMENT AND PLAN    Diagnoses and all orders for this visit:    1. Encounter for wellness examination in adult (Primary)    2. Primary osteoarthritis of both knees  Comments:  - cont PT  - PRN NSAID  Orders:  -     diclofenac (VOLTAREN) 50 MG EC tablet; Take 1 tablet by mouth 2 (Two) Times a Day As Needed (joint pain).  Dispense: 60 tablet; Refill: 1    3. DDD (degenerative disc disease), lumbar  Comments:  - cont PT  - PRN NSAID  Orders:  -     diclofenac (VOLTAREN) 50 MG EC tablet; Take 1 tablet by mouth 2 (Two) Times a Day As Needed (joint pain).  Dispense: 60 tablet; Refill: 1    4. Elevated fasting glucose  Comments:  - counseled re TLCs    5. Mixed hyperlipidemia  Comments:  - counseled re TLCs    6. Impacted cerumen of right ear  Comments:  - OTC  debrox        Preventative counseling completed including relevant screenings, appropriate vaccinations, healthy nutrition, and appropriate physical activity.  Class 2 Severe Obesity (BMI >=35 and <=39.9). Obesity-related health conditions include the following: dyslipidemias and osteoarthritis. Obesity is newly identified. BMI is is above average; BMI management plan is completed. We discussed low calorie, low carb based diet program, portion control and increasing exercise.          Medications, including side effects, were discussed with the patient. Patient verbalized understanding.  The plan of care was discussed. All questions were answered. Patient verbalized understanding.        Return in about 1 year (around 5/2/2023) for fasting labs one week prior to, Annual physical., or sooner if needed.

## 2022-05-02 NOTE — THERAPY TREATMENT NOTE
Outpatient Physical Therapy Ortho Treatment Note   Malia Hutchison     Patient Name: Fanny Winchester  : 1965  MRN: [id number]  Today's Date: 2022      Visit Date: 2022    Visit Dx:    ICD-10-CM ICD-9-CM   1. Chronic pain of both knees  M25.561 719.46    M25.562 338.29    G89.29    2. Chronic midline low back pain without sciatica  M54.50 724.2    G89.29 338.29       Patient Active Problem List   Diagnosis   • Gastroesophageal reflux disease with esophagitis without hemorrhage   • Fibromyalgia   • Rheumatoid arthritis (HCC)   • DDD (degenerative disc disease), lumbar        Past Medical History:   Diagnosis Date   • Anxiety    • DDD (degenerative disc disease), lumbar 3/28/2022   • Fibromyalgia    • Osteoarthritis    • Rheumatoid arthritis involving multiple sites (HCC)         Past Surgical History:   Procedure Laterality Date   • COLONOSCOPY N/A 2020    Procedure: COLONOSCOPY with polypectomy;  Surgeon: Adarsh Arredondo MD;  Location: [facility];  Service: Gastroenterology;  Laterality: N/A;  Rectal polyp   • ENDOSCOPY N/A 2020    Procedure: ESOPHAGOGASTRODUODENOSCOPY with biopsies;  Surgeon: Adarsh Arredondo MD;  Location: [facility] OR;  Service: Gastroenterology;  Laterality: N/A;  Reflux esophagitis  Gastritis  Duodenitis  Hiatal hernia  Biopsies: distal esophagus, gastric, duodenum   • GANGLION CYST EXCISION Left 2019   • HYSTERECTOMY     • OOPHORECTOMY Right 2020   • ULNAR NERVE TRANSPOSITION Left 2019                        PT Assessment/Plan     Row Name 22 0555          PT Assessment    Assessment Comments Pt tolerated her exercise progression well.  -GC            PT Plan    PT Plan Comments Pt is to continue her HEP daily. Will re-ck in 2 weeks.  -GC           User Key  (r) = Recorded By, (t) = Taken By, (c) = Cosigned By    Initials Name Provider Type    Tesfaye Mejias, PT Physical Therapist                   OP Exercises     Row Name  "05/02/22 0555             Subjective Comments    Subjective Comments Pt states her back is pretty good. She still has medial left knee pain with steps.  -GC              Exercise 1    Exercise Name 1 Hamstring stretch-bilateral  -GC      Cueing 1 Verbal;Tactile  -GC      Reps 1 10  -GC      Time 1 10 secs  -GC              Exercise 2    Exercise Name 2 Piriformis stretch-bilateral  -GC      Cueing 2 Verbal;Tactile  -GC      Reps 2 10  -GC      Time 2 10 secs  -GC              Exercise 3    Exercise Name 3 MET for FRS right at L5/S1 and left on right posterior sacral torsion  -GC      Cueing 3 Verbal;Tactile  -GC      Time 3 --  -GC              Exercise 4    Exercise Name 4 Unilateral press ups on left  -GC      Cueing 4 Verbal;Demo  -GC      Reps 4 15  -GC              Exercise 5    Exercise Name 5 SLR-bialteral  -GC      Cueing 5 Verbal;Tactile  -GC      Reps 5 25  -GC      Time 5 2#  -GC              Exercise 6    Exercise Name 6 SAQ-bilateral  -GC      Cueing 6 Verbal;Tactile  -GC      Reps 6 25  -GC      Time 6 2#  -GC              Exercise 7    Exercise Name 7 LAQ/ball squeeze  -GC      Cueing 7 Verbal;Tactile  -GC      Reps 7 25  -GC      Time 7 2#  -GC              Exercise 8    Exercise Name 8 TKE vs theraband-bilateral  -GC      Cueing 8 Verbal;Demo  -GC      Reps 8 25  -GC      Time 8 gold  -GC              Exercise 9    Exercise Name 9 AP mobilizations L3-5  -GC      Cueing 9 Verbal;Tactile  -GC      Time 9 3 min  -GC              Exercise 10    Exercise Name 10 Press ups with overpressure at L5 and S1  -GC      Cueing 10 Verbal;Tactile  -GC      Reps 10 5x ea level  -GC              Exercise 11    Exercise Name 11 Chest raise  -GC      Cueing 11 Verbal;Tactile  -GC      Reps 11 20  -GC              Exercise 12    Exercise Name 12 Partial squat/ball squeeze  -GC      Cueing 12 Verbal;Tactile  -GC      Reps 12 25  -GC              Exercise 13    Exercise Name 13 Lateral dips on 2\" step  -GC      Cueing 13 " Verbal;Demo  -GC      Reps 13 25  -GC            User Key  (r) = Recorded By, (t) = Taken By, (c) = Cosigned By    Initials Name Provider Type    Tesfaye Mejias, PT Physical Therapist                                                Time Calculation:   Start Time: 0555  Stop Time: 0641  Time Calculation (min): 46 min  Therapy Charges for Today     Code Description Service Date Service Provider Modifiers Qty    04702873426 HC PT THER PROC EA 15 MIN 5/2/2022 Tesfaye Bellamy, PT GP 2                    Tesfaye Bellamy, PT  5/2/2022

## 2022-05-16 ENCOUNTER — HOSPITAL ENCOUNTER (OUTPATIENT)
Dept: PHYSICAL THERAPY | Facility: HOSPITAL | Age: 57
Setting detail: THERAPIES SERIES
Discharge: HOME | End: 2022-05-16

## 2022-05-16 DIAGNOSIS — M54.50 CHRONIC MIDLINE LOW BACK PAIN WITHOUT SCIATICA: ICD-10-CM

## 2022-05-16 DIAGNOSIS — M25.562 CHRONIC PAIN OF BOTH KNEES: Primary | ICD-10-CM

## 2022-05-16 DIAGNOSIS — G89.29 CHRONIC MIDLINE LOW BACK PAIN WITHOUT SCIATICA: ICD-10-CM

## 2022-05-16 DIAGNOSIS — M25.561 CHRONIC PAIN OF BOTH KNEES: Primary | ICD-10-CM

## 2022-05-16 DIAGNOSIS — G89.29 CHRONIC PAIN OF BOTH KNEES: Primary | ICD-10-CM

## 2022-05-16 PROCEDURE — 97110 THERAPEUTIC EXERCISES: CPT | Performed by: PHYSICAL THERAPIST

## 2022-05-16 NOTE — THERAPY RE-EVALUATION
Outpatient Physical Therapy Ortho Re-Evaluation   Malia Hutchison     Patient Name: Fanny Winchester  : 1965  MRN: [id number]  Today's Date: 2022      Visit Date: 2022    Patient Active Problem List   Diagnosis   • Gastroesophageal reflux disease with esophagitis without hemorrhage   • Fibromyalgia   • Rheumatoid arthritis (HCC)   • DDD (degenerative disc disease), lumbar   • Primary osteoarthritis of both knees   • Elevated fasting glucose   • Mixed hyperlipidemia        Past Medical History:   Diagnosis Date   • Anxiety    • DDD (degenerative disc disease), lumbar 3/28/2022   • Fibromyalgia    • Osteoarthritis    • Rheumatoid arthritis involving multiple sites (HCC)         Past Surgical History:   Procedure Laterality Date   • COLONOSCOPY N/A 2020    Procedure: COLONOSCOPY with polypectomy;  Surgeon: Adarsh Arredondo MD;  Location: [facility] OR;  Service: Gastroenterology;  Laterality: N/A;  Rectal polyp   • ENDOSCOPY N/A 2020    Procedure: ESOPHAGOGASTRODUODENOSCOPY with biopsies;  Surgeon: Adarsh Arredondo MD;  Location: [facility] OR;  Service: Gastroenterology;  Laterality: N/A;  Reflux esophagitis  Gastritis  Duodenitis  Hiatal hernia  Biopsies: distal esophagus, gastric, duodenum   • GANGLION CYST EXCISION Left 2019   • HYSTERECTOMY  2000   • OOPHORECTOMY Right 2020   • ULNAR NERVE TRANSPOSITION Left 2019       Visit Dx:     ICD-10-CM ICD-9-CM   1. Chronic pain of both knees  M25.561 719.46    M25.562 338.29    G89.29    2. Chronic midline low back pain without sciatica  M54.50 724.2    G89.29 338.29              PT Ortho     Row Name 22 0600       Subjective Comments    Subjective Comments Pt states her back and right knee are feeling pretty good. She still c/o medial left knee pain with steps and weight bearing activities.  -GC       Knee Palpation    Medial Joint Line Left:;Tender  -GC       Head/Neck/Trunk    Trunk Extension AROM 75% range  -GC     Trunk Flexion AROM WFL  -GC    Trunk Lt Lateral Flexion AROM 75% range  -GC    Trunk Rt Lateral Flexion AROM 75% range  -GC    Trunk Lt Rotation AROM WFL  -GC    Trunk Rt Rotation AROM WFL  -GC       MMT Neck/Trunk    Trunk Flexion MMT, Gross Movement (4+/5) good plus  -GC    Trunk Extension MMT, Gross Movement (5/5) normal  -GC       MMT Right Lower Ext    Rt Hip Flexion MMT, Gross Movement (5/5) normal  -GC    Rt Hip Extension MMT, Gross Movement (5/5) normal  -GC    Rt Hip ABduction MMT, Gross Movement (5/5) normal  -GC    Rt Hip ADduction MMT, Gross Movement (5/5) normal  -GC    Rt Knee Extension MMT, Gross Movement (5/5) normal  -GC    Rt Knee Flexion MMT, Gross Movement (5/5) normal  -GC       MMT Left Lower Ext    Lt Hip Flexion MMT, Gross Movement (4+/5) good plus  -GC    Lt Hip Extension MMT, Gross Movement (5/5) normal  -GC    Lt Hip ABduction MMT, Gross Movement (5/5) normal  -GC    Lt Hip ADduction MMT, Gross Movement (5/5) normal  -GC    Lt Knee Extension MMT, Gross Movement (4+/5) good plus  -GC    Lt Knee Flexion MMT, Gross Movement (5/5) normal  -GC          User Key  (r) = Recorded By, (t) = Taken By, (c) = Cosigned By    Initials Name Provider Type     Tesfaye Bellamy, PT Physical Therapist                                   PT OP Goals     Row Name 05/16/22 0600          PT Short Term Goals    STG Date to Achieve 04/25/22  -     STG 1 Decrease knee and back pain to 3-4/10 with activity.  -     STG 1 Progress Partially Met  -     STG 2 Increase trunk ROM to at least 75% range all planes with testing.  -     STG 2 Progress Met  -     STG 3 Increase bilateral knee EXT ROM to 0 degrees with testing.  -     STG 3 Progress Partially Met  -     STG 4 Increase core strength and LE strength to at least 4+/5 all planes with testing.  -     STG 4 Progress Met  -     STG 5 Increase hamstring, piriformis, and quadratus flexibility to no more than a minimal restriciton wtih testing.  -      STG 5 Progress Met  -     STG 6 Pt will be independent with her HEP issued by this therapist.  -     STG 6 Progress Met  -            Long Term Goals    LTG Date to Achieve 05/09/22  -     LTG 1 Decrease knee and back pain to 0-1/10 with activity.  -GC     LTG 1 Progress Partially Met  -     LTG 2 Increase trunk ROM to WFL all planes with testing.  -GC     LTG 2 Progress Partially Met  -     LTG 3 Increase core strength and LE strength to 5/5 all planes with testing.  -GC     LTG 3 Progress Partially Met  -     LTG 4 Increase hamstring, piriformis, and quadratus flexibility to WFL wtih testing.  -GC     LTG 4 Progress Ongoing  -     LTG 5 Pt will ambulate normally on levels and stairs.  -     LTG 5 Progress Met  -     LTG 6 Pt will be independent with all ADLs without pain.  -           User Key  (r) = Recorded By, (t) = Taken By, (c) = Cosigned By    Initials Name Provider Type     Tesfaye Bellamy, PT Physical Therapist                 PT Assessment/Plan     Row Name 05/16/22 0600          PT Assessment    Assessment Comments Pt is doing well with most goals met. Feel she can continue to make gains with her HEP onbly. Feel her ongoing left knee pain may need an ortho consult to further diagnose.  -            PT Plan    PT Plan Comments Pt is to continue her HEP daily.  -           User Key  (r) = Recorded By, (t) = Taken By, (c) = Cosigned By    Initials Name Provider Type    Tesfaye Mejias, PT Physical Therapist                   OP Exercises     Row Name 05/16/22 0600             Subjective Comments    Subjective Comments Pt states her back and right knee are feeling pretty good. She still c/o medial left knee pain with steps and weight bearing activities.  -              Exercise 1    Exercise Name 1 Hamstring stretch-bilateral  -      Cueing 1 Verbal;Tactile  -      Reps 1 10  -GC      Time 1 10 secs  -GC              Exercise 2    Exercise Name 2 Piriformis  "stretch-bilateral  -GC      Cueing 2 Verbal;Tactile  -GC      Reps 2 10  -GC      Time 2 10 secs  -GC              Exercise 3    Exercise Name 3 MET for FRS right at L5/S1 and left on right posterior sacral torsion  -GC      Cueing 3 Verbal;Tactile  -GC              Exercise 4    Exercise Name 4 Unilateral press ups on left  -GC      Cueing 4 Verbal;Demo  -GC      Reps 4 15  -GC              Exercise 5    Exercise Name 5 SLR-bialteral  -GC      Cueing 5 Verbal;Tactile  -GC      Reps 5 30  -GC      Time 5 2#  -GC              Exercise 6    Exercise Name 6 SAQ-bilateral  -GC      Cueing 6 Verbal;Tactile  -GC      Reps 6 30  -GC      Time 6 2#  -GC              Exercise 7    Exercise Name 7 LAQ/ball squeeze  -GC      Cueing 7 Verbal;Tactile  -GC      Reps 7 30  -GC      Time 7 2#  -GC              Exercise 8    Exercise Name 8 TKE vs theraband-bilateral  -GC      Cueing 8 Verbal;Demo  -GC      Reps 8 30  -GC      Time 8 gold  -GC              Exercise 9    Exercise Name 9 AP mobilizations L3-5  -GC      Cueing 9 Verbal;Tactile  -GC      Time 9 3 min  -GC              Exercise 10    Exercise Name 10 Press ups with overpressure at L5 and S1  -GC      Cueing 10 Verbal;Tactile  -GC      Reps 10 5x ea level  -GC              Exercise 11    Exercise Name 11 Chest raise  -GC      Cueing 11 Verbal;Tactile  -GC      Reps 11 20  -GC              Exercise 12    Exercise Name 12 Partial squat/ball squeeze  -GC      Cueing 12 Verbal;Tactile  -GC      Reps 12 30  -GC              Exercise 13    Exercise Name 13 Lateral dips on 2\" step  -GC      Cueing 13 Verbal;Demo  -GC      Reps 13 25  -GC            User Key  (r) = Recorded By, (t) = Taken By, (c) = Cosigned By    Initials Name Provider Type    GC Tesfaye Bellamy PT Physical Therapist                                        Time Calculation:     Start Time: 0600  Stop Time: 0644  Time Calculation (min): 44 min     Therapy Charges for Today     Code Description Service Date Service " Provider Modifiers Qty    [id number]  PT THER PROC EA 15 MIN 5/16/2022 Tesfaye Bellamy, PT GP 2                    Tesfaye Bellamy, PT  5/16/2022

## 2022-05-25 ENCOUNTER — OFFICE VISIT (OUTPATIENT)
Dept: OBSTETRICS AND GYNECOLOGY | Facility: CLINIC | Age: 57
End: 2022-05-25

## 2022-05-25 ENCOUNTER — PATIENT ROUNDING (BHMG ONLY) (OUTPATIENT)
Dept: OBSTETRICS AND GYNECOLOGY | Facility: CLINIC | Age: 57
End: 2022-05-25

## 2022-05-25 VITALS
DIASTOLIC BLOOD PRESSURE: 88 MMHG | SYSTOLIC BLOOD PRESSURE: 130 MMHG | WEIGHT: 196 LBS | BODY MASS INDEX: 36.07 KG/M2 | HEIGHT: 62 IN

## 2022-05-25 DIAGNOSIS — Z01.419 PAP SMEAR, LOW-RISK: Primary | ICD-10-CM

## 2022-05-25 DIAGNOSIS — Z01.419 ROUTINE GYNECOLOGICAL EXAMINATION: ICD-10-CM

## 2022-05-25 DIAGNOSIS — Z12.31 ENCOUNTER FOR SCREENING MAMMOGRAM FOR MALIGNANT NEOPLASM OF BREAST: ICD-10-CM

## 2022-05-25 DIAGNOSIS — Z11.51 SPECIAL SCREENING EXAMINATION FOR HUMAN PAPILLOMAVIRUS (HPV): ICD-10-CM

## 2022-05-25 LAB
BILIRUB BLD-MCNC: NEGATIVE MG/DL
CLARITY, POC: CLEAR
COLOR UR: YELLOW
GLUCOSE UR STRIP-MCNC: NEGATIVE MG/DL
KETONES UR QL: NEGATIVE
LEUKOCYTE EST, POC: NEGATIVE
NITRITE UR-MCNC: NEGATIVE MG/ML
PH UR: 5 [PH] (ref 5–8)
PROT UR STRIP-MCNC: NEGATIVE MG/DL
RBC # UR STRIP: NEGATIVE /UL
SP GR UR: 1.02 (ref 1–1.03)
UROBILINOGEN UR QL: NORMAL

## 2022-05-25 PROCEDURE — 81002 URINALYSIS NONAUTO W/O SCOPE: CPT | Performed by: NURSE PRACTITIONER

## 2022-05-25 PROCEDURE — 99396 PREV VISIT EST AGE 40-64: CPT | Performed by: NURSE PRACTITIONER

## 2022-05-25 NOTE — PROGRESS NOTES
GYN Annual Exam     Chief Complaint   Patient presents with   • Gynecologic Exam       Fanny Winchester is a 56 y.o. female who presents for annual well woman exam. Periods absent since partial hysterectomy in  d/t fibroids.      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 Both ovaries were removed during the surgery.  It was determined to be benign and no follow up needed per pt report.     OB History        2    Para   2    Term   2            AB        Living           SAB        IAB        Ectopic        Molar        Multiple        Live Births                    Mammogram:  Normal   Dexa scan: 2018 Normal   Colonoscopy:   Last Pap :   History of abnormal Pap smear: no  Family history of uterine, colon or ovarian cancer: yes - Colon  Family history of breast cancer: no  History of abnormal mammogram: no        Past Medical History:   Diagnosis Date   • Anxiety    • DDD (degenerative disc disease), lumbar 3/28/2022   • Fibromyalgia    • Osteoarthritis    • Rheumatoid arthritis involving multiple sites (HCC)        Past Surgical History:   Procedure Laterality Date   • COLONOSCOPY N/A 2020    Procedure: COLONOSCOPY with polypectomy;  Surgeon: Adarsh Arredondo MD;  Location: [facility];  Service: Gastroenterology;  Laterality: N/A;  Rectal polyp   • ENDOSCOPY N/A 2020    Procedure: ESOPHAGOGASTRODUODENOSCOPY with biopsies;  Surgeon: Adarsh Arredondo MD;  Location: [facility];  Service: Gastroenterology;  Laterality: N/A;  Reflux esophagitis  Gastritis  Duodenitis  Hiatal hernia  Biopsies: distal esophagus, gastric, duodenum   • GANGLION CYST EXCISION Left 2019   • HYSTERECTOMY     • OOPHORECTOMY Right 2020   • ULNAR NERVE TRANSPOSITION Left 2019         Current Outpatient Medications:   •  diclofenac (VOLTAREN) 50 MG EC tablet, Take 1 tablet by mouth 2 (Two) Times a Day As Needed (joint pain).,  "Disp: 60 tablet, Rfl: 1    No Known Allergies    Social History     Tobacco Use   • Smoking status: Never Smoker   • Smokeless tobacco: Never Used   Substance Use Topics   • Alcohol use: Yes     Comment: Occasuional       Family History   Problem Relation Age of Onset   • Heart disease Mother    • Lupus Mother    • Heart disease Father    • Hypertension Father    • Hyperlipidemia Father    • Colon cancer Maternal Aunt    • Colon polyps Maternal Aunt    • Breast cancer Neg Hx        Review of Systems   Constitutional: Negative.  Negative for diaphoresis and fever.   Respiratory: Negative.    Cardiovascular: Negative.    Gastrointestinal: Positive for abdominal pain. Negative for constipation, nausea and vomiting.   Endocrine: Negative.    Genitourinary: Positive for pelvic pain. Negative for decreased urine volume, dyspareunia, frequency, hematuria and urgency.   Musculoskeletal: Negative.    Skin: Negative.    Neurological: Negative.    Psychiatric/Behavioral: Negative.        /88   Ht 157.5 cm (62.01\")   Wt 88.9 kg (196 lb)   Breastfeeding No   BMI 35.84 kg/m²     Physical Exam  Vitals and nursing note reviewed.   Constitutional:       Appearance: Normal appearance. She is well-developed.   Neck:      Thyroid: No thyromegaly.   Cardiovascular:      Rate and Rhythm: Normal rate and regular rhythm.      Heart sounds: Normal heart sounds.   Pulmonary:      Effort: Pulmonary effort is normal.      Breath sounds: Normal breath sounds.   Chest:   Breasts:      Right: No inverted nipple, mass, nipple discharge, skin change or tenderness.      Left: No inverted nipple, mass, nipple discharge, skin change or tenderness.       Abdominal:      General: Bowel sounds are normal.      Palpations: Abdomen is soft.   Genitourinary:     Exam position: Supine.      Labia:         Right: No rash, tenderness, lesion or injury.         Left: No rash, tenderness, lesion or injury.       Vagina: No signs of injury and foreign " body. No vaginal discharge, erythema, tenderness or bleeding.      Uterus: Absent.       Adnexa:         Right: No mass, tenderness or fullness.          Left: No mass, tenderness or fullness.        Rectum: Normal.      Comments: Cervix absent  Rectocele noted   Musculoskeletal:         General: Normal range of motion.      Cervical back: Normal range of motion and neck supple.   Skin:     General: Skin is warm and dry.   Neurological:      General: No focal deficit present.      Mental Status: She is alert and oriented to person, place, and time.   Psychiatric:         Mood and Affect: Mood normal.         Behavior: Behavior normal.            Assessment     1) GYN annual well woman exam.   2) Postmenopausal   3) Pelvic/abdominal pain   4) Rectocele      Plan     1) Well woman exam- Pap/HPV collected. Screening MMG ordered. Enc monthly SBE.   2) Postmenopausal- No c/o.   3) Pelvic abdominal pain- Uncertain cause. Disc TVUS will not be helpful since S/P hysterectomy and oophorectomy. Rec CT of abdomen/pelvis.   4) Rectocele- Pt reports occasionally have difficulty with BM required manual assistance via the vagina with her stool during defecation. Enc pt to keep bowels soft. Rec kegel exercises, offered pelvic floor PT. Also disc surgery consult.   5) Colonoscopy- UTD  6) Bone health- Enc weight bearing exercise and Ca+/Vit D supplement  7) Body mass index is 35.84 kg/m².    RTO PRN and 1 year AE       Batsheva Funes, AMANDA  5/25/2022  13:17 EDT

## 2022-05-25 NOTE — PROGRESS NOTES
A MY-CHART MESSAGE HAS BEEN SENT TO THE PATIENT FOR PATIENT ROUNDING WITH [facility]

## 2022-06-01 LAB
CYTOLOGIST CVX/VAG CYTO: ABNORMAL
CYTOLOGY CVX/VAG DOC CYTO: ABNORMAL
CYTOLOGY CVX/VAG DOC THIN PREP: ABNORMAL
DX ICD CODE: ABNORMAL
DX ICD CODE: ABNORMAL
HIV 1 & 2 AB SER-IMP: ABNORMAL
HPV I/H RISK 4 DNA CVX QL PROBE+SIG AMP: NEGATIVE
OTHER STN SPEC: ABNORMAL
PATHOLOGIST CVX/VAG CYTO: ABNORMAL
STAT OF ADQ CVX/VAG CYTO-IMP: ABNORMAL

## 2022-08-05 ENCOUNTER — TELEPHONE (OUTPATIENT)
Dept: OBSTETRICS AND GYNECOLOGY | Facility: CLINIC | Age: 57
End: 2022-08-05

## 2022-08-05 NOTE — TELEPHONE ENCOUNTER
Caller: Fanny Winchester    Relationship to patient: Self    Best call back number: [phone]    Patient is needing: TO CANCEL HER COLPOSCOPY APPT AND RESCHEDULE ONCE HER INSURANCE COMES IN.

## 2022-08-26 ENCOUNTER — HOSPITAL ENCOUNTER (OUTPATIENT)
Dept: MAMMOGRAPHY | Facility: HOSPITAL | Age: 57
Discharge: HOME | End: 2022-08-26
Admitting: NURSE PRACTITIONER

## 2022-08-26 DIAGNOSIS — Z12.31 ENCOUNTER FOR SCREENING MAMMOGRAM FOR MALIGNANT NEOPLASM OF BREAST: ICD-10-CM

## 2022-08-26 PROCEDURE — 77063 BREAST TOMOSYNTHESIS BI: CPT

## 2022-08-26 PROCEDURE — 77067 SCR MAMMO BI INCL CAD: CPT

## 2022-08-31 ENCOUNTER — OFFICE VISIT (OUTPATIENT)
Dept: OBSTETRICS AND GYNECOLOGY | Facility: CLINIC | Age: 57
End: 2022-08-31

## 2022-08-31 VITALS
BODY MASS INDEX: 34.6 KG/M2 | SYSTOLIC BLOOD PRESSURE: 122 MMHG | DIASTOLIC BLOOD PRESSURE: 74 MMHG | HEIGHT: 62 IN | WEIGHT: 188 LBS

## 2022-08-31 DIAGNOSIS — R87.622 LGSIL PAP SMEAR OF VAGINA: Primary | ICD-10-CM

## 2022-08-31 PROCEDURE — 57452 EXAM OF CERVIX W/SCOPE: CPT | Performed by: OBSTETRICS & GYNECOLOGY

## 2022-08-31 NOTE — PROGRESS NOTES
Colposcopy Procedure Note    Indications: Most recent Pap smear showed: low-grade squamous intraepithelial neoplasia (LGSIL - encompassing HPV,mild dysplasia,LICHA I).  Prior cervical/vaginal disease: normal exam without visible pathology.  Prior cervical treatment: no treatment.    Procedure Details   The risks and benefits of the procedure and Verbal informed consent obtained.    Speculum placed in vagina and excellent visualization of cervix achieved, cervix swabbed x 3 with acetic acid solution and Lugol's solution     Findings:  Cervix: absent  Vaginal inspection: normal without visible lesions.  Vulvar colposcopy: vulvar colposcopy not performed.    OBGyn Exam    Specimens: none    Procedure tolerated well: yes    Complications: none.    Plan:  PlanColposcopyMM: Repeat pap  6 months    D Kwame Espinosa MD  8/31/2022  13:43 EDT

## 2022-10-11 ENCOUNTER — OFFICE VISIT (OUTPATIENT)
Dept: ORTHOPEDIC SURGERY | Facility: CLINIC | Age: 57
End: 2022-10-11

## 2022-10-11 VITALS
SYSTOLIC BLOOD PRESSURE: 145 MMHG | HEART RATE: 83 BPM | BODY MASS INDEX: 34.41 KG/M2 | DIASTOLIC BLOOD PRESSURE: 85 MMHG | WEIGHT: 187 LBS | HEIGHT: 62 IN

## 2022-10-11 DIAGNOSIS — M05.761 RHEUMATOID ARTHRITIS INVOLVING BOTH KNEES WITH POSITIVE RHEUMATOID FACTOR: Primary | ICD-10-CM

## 2022-10-11 DIAGNOSIS — S86.912A STRAIN OF LEFT KNEE, INITIAL ENCOUNTER: ICD-10-CM

## 2022-10-11 DIAGNOSIS — M05.762 RHEUMATOID ARTHRITIS INVOLVING BOTH KNEES WITH POSITIVE RHEUMATOID FACTOR: Primary | ICD-10-CM

## 2022-10-11 PROCEDURE — 20610 DRAIN/INJ JOINT/BURSA W/O US: CPT | Performed by: ORTHOPAEDIC SURGERY

## 2022-10-11 PROCEDURE — 99203 OFFICE O/P NEW LOW 30 MIN: CPT | Performed by: ORTHOPAEDIC SURGERY

## 2022-10-11 RX ORDER — TRIAMCINOLONE ACETONIDE 40 MG/ML
40 INJECTION, SUSPENSION INTRA-ARTICULAR; INTRAMUSCULAR
Status: COMPLETED | OUTPATIENT
Start: 2022-10-11 | End: 2022-10-11

## 2022-10-11 RX ORDER — LIDOCAINE HYDROCHLORIDE 10 MG/ML
4 INJECTION, SOLUTION EPIDURAL; INFILTRATION; INTRACAUDAL; PERINEURAL
Status: COMPLETED | OUTPATIENT
Start: 2022-10-11 | End: 2022-10-11

## 2022-10-11 RX ADMIN — TRIAMCINOLONE ACETONIDE 40 MG: 40 INJECTION, SUSPENSION INTRA-ARTICULAR; INTRAMUSCULAR at 13:42

## 2022-10-11 RX ADMIN — LIDOCAINE HYDROCHLORIDE 4 ML: 10 INJECTION, SOLUTION EPIDURAL; INFILTRATION; INTRACAUDAL; PERINEURAL at 13:42

## 2022-10-11 NOTE — PROGRESS NOTES
Subjective: Bilateral knee pain     Patient ID: Fanny Winchester is a 57 y.o. female.    Chief Complaint:    History of Present Illness 57-year-old female seen today for the first time regarding bilateral knee pain that she has had for many years but also regarding the acute left knee pain that developed after straining in the knee September of this year.  Twisted the knee was carried apparently her grandchild.  States she has a past history of rheumatoid arthritis with positive rheumatoid factor is been under the care of a rheumatologist.  Seen in her a couple years ago and insurance ran out.  At 1 point she was taking methotrexate.  Currently taking Voltaren with no relief of her symptoms.  Is also been seen in physical therapy without much relief.  She describes the pain as 5 out of 10 pain.  Pain particularly with ascending descending stairs.  The left knee pain is slightly better but continues to have discomfort.       Social History     Occupational History   • Not on file   Tobacco Use   • Smoking status: Never   • Smokeless tobacco: Never   Vaping Use   • Vaping Use: Never used   Substance and Sexual Activity   • Alcohol use: Yes     Comment: Occasuional   • Drug use: Never   • Sexual activity: Not Currently      Review of Systems   Constitutional: Negative for chills, diaphoresis, fever and unexpected weight change.   HENT: Negative for hearing loss, nosebleeds, sore throat and tinnitus.    Eyes: Negative for pain and visual disturbance.   Respiratory: Negative for cough, shortness of breath and wheezing.    Cardiovascular: Negative for chest pain and palpitations.   Gastrointestinal: Negative for abdominal pain, diarrhea, nausea and vomiting.   Endocrine: Negative for cold intolerance, heat intolerance and polydipsia.   Genitourinary: Negative for difficulty urinating, dysuria and hematuria.   Musculoskeletal: Positive for arthralgias, joint swelling and myalgias.   Skin: Negative for rash and wound.    Allergic/Immunologic: Negative for environmental allergies.   Neurological: Negative for dizziness, syncope and numbness.   Hematological: Does not bruise/bleed easily.   Psychiatric/Behavioral: Negative for dysphoric mood and sleep disturbance. The patient is not nervous/anxious.          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   • DDD (degenerative disc disease), lumbar 03/28/2022   • Fibromyalgia    • Osteoarthritis    • Rheumatoid arthritis involving multiple sites (HCC)      Past Surgical History:   Procedure Laterality Date   • COLONOSCOPY N/A 12/22/2020    Procedure: COLONOSCOPY with polypectomy;  Surgeon: Adarsh Arredondo MD;  Location: [facility];  Service: Gastroenterology;  Laterality: N/A;  Rectal polyp   • ENDOSCOPY N/A 12/22/2020    Procedure: ESOPHAGOGASTRODUODENOSCOPY with biopsies;  Surgeon: Adarsh Arredondo MD;  Location: [facility] OR;  Service: Gastroenterology;  Laterality: N/A;  Reflux esophagitis  Gastritis  Duodenitis  Hiatal hernia  Biopsies: distal esophagus, gastric, duodenum   • GANGLION CYST EXCISION Left 2019   • HYSTERECTOMY  2000   • OOPHORECTOMY Right 11/2020   • TUMOR REMOVAL     • ULNAR NERVE TRANSPOSITION Left 2019     Family History   Problem Relation Age of Onset   • Heart disease Father    • Hypertension Father    • Hyperlipidemia Father    • Heart disease Mother    • Lupus Mother    • Colon cancer Maternal Aunt    • Colon polyps Maternal Aunt    • Breast cancer Neg Hx    • Ovarian cancer Neg Hx          Objective:  Vitals:    10/11/22 1321   BP: 145/85   Pulse: 83         10/11/22  1321   Weight: 84.8 kg (187 lb)     Body mass index is 34.2 kg/m².        Ortho Exam   AP and lateral x-rays of the knee done at the hospital September this year does show decrease in medial joint space and what appears to be the patellofemoral space in the lateral.  No acute changes noted.  She is alert and oriented x3.  Has normocephalic and  sclerae clear.  Neither knee shows significant patellofemoral crepitus with range of motion 0 125 degrees.  Quad hamstring function 5/5.  No instability in flexion extension nor any varus or valgus instability in either knee in 10 to 30 degrees.  There is bilateral medial joint line tenderness with negative Ilan's.  Her calf is nontender.  Good distal pulses no motor or sensory deficit.    Assessment:        1. Rheumatoid arthritis involving both knees with positive rheumatoid factor (HCC)    2. Strain of left knee, initial encounter           Plan: Reviewed with the patient her history x-rays and physical exam.  She has mild to moderate rheumatoid arthritic changes in both knees there is failed respond to the Voltaren and physical therapy.  I did recommend that she now has insurance once again to contact her rheumatologist to get back on her care but as far as treatment for recurrent discomfort and pain after reviewing treatment options we will proceed with bilateral cortisone injections of 4 cc of lidocaine and 1 cc Kenalog which is given in both knees in the superolateral portal after sterile prepping.  Postinjection instructions given to the patient.  Return to see me in 6 weeks if still symptomatic may consider the gel injection but again I instructed her to contact her rheumatologist regarding a follow-up visit with her.  Patient was in agreement  Large Joint Arthrocentesis  Date/Time: 10/11/2022 1:42 PM  Consent given by: patient  Site marked: site marked  Timeout: Immediately prior to procedure a time out was called to verify the correct patient, procedure, equipment, support staff and site/side marked as required   Supporting Documentation  Indications: pain   Procedure Details  Location: knee - Knee joint: BILATERAL KNEE.  Preparation: Patient was prepped and draped in the usual sterile fashion  Needle size: 22 G  Approach: superior (lateral)  Medications administered: 40 mg triamcinolone acetonide 40  MG/ML; 4 mL lidocaine PF 1% 1 %  Patient tolerance: patient tolerated the procedure well with no immediate complications                  Work Status:    GINA query complete.    Orders:  Orders Placed This Encounter   Procedures   • Large Joint Arthrocentesis       Medications:  No orders of the defined types were placed in this encounter.      Followup:  Return in about 6 weeks (around 11/22/2022).          Dictated utilizing Dragon dictation

## 2022-11-22 ENCOUNTER — OFFICE VISIT (OUTPATIENT)
Dept: ORTHOPEDIC SURGERY | Facility: CLINIC | Age: 57
End: 2022-11-22

## 2022-11-22 VITALS — BODY MASS INDEX: 34.41 KG/M2 | HEIGHT: 62 IN | WEIGHT: 187 LBS

## 2022-11-22 DIAGNOSIS — M05.761 RHEUMATOID ARTHRITIS INVOLVING BOTH KNEES WITH POSITIVE RHEUMATOID FACTOR: Primary | ICD-10-CM

## 2022-11-22 DIAGNOSIS — M05.762 RHEUMATOID ARTHRITIS INVOLVING BOTH KNEES WITH POSITIVE RHEUMATOID FACTOR: Primary | ICD-10-CM

## 2022-11-22 PROCEDURE — 99213 OFFICE O/P EST LOW 20 MIN: CPT | Performed by: ORTHOPAEDIC SURGERY

## 2022-11-22 NOTE — PROGRESS NOTES
Subjective: Rheumatoid arthritis both knees     Patient ID: Fanny Winchester is a 57 y.o. female.    Chief Complaint:    History of Present Illness 57-year-old female returns to evaluate results cortisone injection given into both knees at her last visit and she reports significant improvement as far as reduction of her pain and discomfort.  States she is now able to form all activities of daily living with minimal discomfort.  Left knee still remains a little more symptomatic than the right but again is able to form these activities with minimal pain.  States her pain now is a 1 or 2.  Still has not contacted her rheumatologist however as of yet.       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   • DDD (degenerative disc disease), lumbar 03/28/2022   • Fibromyalgia    • Osteoarthritis    • Rheumatoid arthritis involving multiple sites (HCC)      Past Surgical History:   Procedure Laterality Date   • COLONOSCOPY N/A 12/22/2020    Procedure: COLONOSCOPY with polypectomy;  Surgeon: Adarsh Arredondo MD;  Location: [facility] OR;  Service: Gastroenterology;  Laterality: N/A;  Rectal polyp   • ENDOSCOPY N/A 12/22/2020    Procedure: ESOPHAGOGASTRODUODENOSCOPY with biopsies;  Surgeon: Adarsh Arredondo MD;  Location: [facility] OR;  Service: Gastroenterology;  Laterality: N/A;  Reflux esophagitis  Gastritis  Duodenitis  Hiatal hernia  Biopsies: distal esophagus, gastric, duodenum   • GANGLION CYST EXCISION Left 2019   • HYSTERECTOMY  2000   • OOPHORECTOMY Right 11/2020   • TUMOR REMOVAL     • ULNAR NERVE TRANSPOSITION Left 2019     Family History   Problem  Relation Age of Onset   • Heart disease Father    • Hypertension Father    • Hyperlipidemia Father    • Heart disease Mother    • Lupus Mother    • Colon cancer Maternal Aunt    • Colon polyps Maternal Aunt    • Breast cancer Neg Hx    • Ovarian cancer Neg Hx          Objective:  There were no vitals filed for this visit.      11/22/22 0827   Weight: 84.8 kg (187 lb)     Body mass index is 34.2 kg/m².        Ortho Exam   She is alert and oriented x3.  Neither knee has any swelling effusion erythema and is cool to touch.  She has 0 125 degrees of motion of both knees with crepitus but minimal pain at this time.  No instability in flexion extension or any varus or valgus instability.  Minimal joint line tenderness.  Calf is nontender.  No motor or sensory deficit.  Tolerating the Voltaren without any GI side effect.    Assessment:        1. Rheumatoid arthritis involving both knees with positive rheumatoid factor (HCC)           Plan: Discussed with the patient that cortisone can be given every 3 months if needed and if it does not last that long gel injections are an option.  Patient understands will return to see me when the knees become symptomatic again and when that is determine whether we can repeat cortisone or proceed with gel injections.  Patient was in agreement.            Work Status:    GINA query complete.    Orders:  No orders of the defined types were placed in this encounter.      Medications:  No orders of the defined types were placed in this encounter.      Followup:  Return if symptoms worsen or fail to improve.          Dictated utilizing Dragon dictation

## 2023-01-10 ENCOUNTER — TELEPHONE (OUTPATIENT)
Dept: ORTHOPEDIC SURGERY | Facility: CLINIC | Age: 58
End: 2023-01-10

## 2023-01-10 NOTE — TELEPHONE ENCOUNTER
Caller: SHAKEEL     Relationship to patient: SELF    Best call back number: [phone]    Chief complaint: PRATIBHA KNEES     Type of visit: INJECTION     Requested date: JAN 13 AROUND 3 PM

## 2023-01-13 ENCOUNTER — HOSPITAL ENCOUNTER (OUTPATIENT)
Dept: GENERAL RADIOLOGY | Facility: HOSPITAL | Age: 58
Discharge: HOME | End: 2023-01-13
Admitting: NURSE PRACTITIONER
Payer: COMMERCIAL

## 2023-01-13 ENCOUNTER — OFFICE VISIT (OUTPATIENT)
Dept: INTERNAL MEDICINE | Facility: CLINIC | Age: 58
End: 2023-01-13
Payer: COMMERCIAL

## 2023-01-13 VITALS
HEART RATE: 74 BPM | TEMPERATURE: 98.2 F | WEIGHT: 189 LBS | OXYGEN SATURATION: 100 % | HEIGHT: 62 IN | SYSTOLIC BLOOD PRESSURE: 138 MMHG | DIASTOLIC BLOOD PRESSURE: 82 MMHG | BODY MASS INDEX: 34.78 KG/M2

## 2023-01-13 DIAGNOSIS — M79.89 PAIN AND SWELLING OF LEFT UPPER EXTREMITY: ICD-10-CM

## 2023-01-13 DIAGNOSIS — M79.602 PAIN AND SWELLING OF LEFT UPPER EXTREMITY: ICD-10-CM

## 2023-01-13 DIAGNOSIS — M05.761 RHEUMATOID ARTHRITIS INVOLVING BOTH KNEES WITH POSITIVE RHEUMATOID FACTOR: ICD-10-CM

## 2023-01-13 DIAGNOSIS — M79.7 FIBROMYALGIA: ICD-10-CM

## 2023-01-13 DIAGNOSIS — M62.838 SPASM OF MUSCLE: Primary | ICD-10-CM

## 2023-01-13 DIAGNOSIS — M43.6 NECK STIFFNESS: ICD-10-CM

## 2023-01-13 DIAGNOSIS — M05.762 RHEUMATOID ARTHRITIS INVOLVING BOTH KNEES WITH POSITIVE RHEUMATOID FACTOR: ICD-10-CM

## 2023-01-13 PROCEDURE — 99214 OFFICE O/P EST MOD 30 MIN: CPT | Performed by: NURSE PRACTITIONER

## 2023-01-13 PROCEDURE — 72050 X-RAY EXAM NECK SPINE 4/5VWS: CPT

## 2023-01-13 RX ORDER — METHOCARBAMOL 500 MG/1
500 TABLET, FILM COATED ORAL 4 TIMES DAILY PRN
Qty: 60 TABLET | Refills: 0 | Status: SHIPPED | OUTPATIENT
Start: 2023-01-13

## 2023-01-13 NOTE — PROGRESS NOTES
"Fanny Winchester is a 57 y.o. female presenting today for   Chief Complaint   Patient presents with   • Spasms     Has had spasms for about 2 years sporadically but yesterday her left arm was having spasms constantly with pain. Pain goes up inside of forearm, medial elbow and around thumb. It is still sore today.        Subjective    History of Present Illness     She notes spasms and pain in L UE. This began about 2yrs ago. Her PSH is s/f ulnar nerve repositioning and ganglion cyst removal. This is worsening over the last 2mo. This is almost daily, and generally multiple times each day. This may last seconds to minutes. She cannot identify any aggravating or alleviating factors. Her PMH is s/f RA which has been untreated for many years, fibromyalgia, and OA. She c/o neck pain and stiffness.    The following portions of the patient's history were reviewed and updated as appropriate: allergies, current medications, problem list, past medical history, past surgical history, family history, and social history.          Objective    Vitals:    01/13/23 1410   BP: 138/82   BP Location: Right arm   Pulse: 74   Temp: 98.2 °F (36.8 °C)   TempSrc: Infrared   SpO2: 100%   Weight: 85.7 kg (189 lb)   Height: 157.5 cm (62\")     Body mass index is 34.57 kg/m².  Nursing notes and vitals reviewed.    Physical Exam  Constitutional:       General: She is not in acute distress.     Appearance: She is well-developed.   Pulmonary:      Effort: Pulmonary effort is normal.   Musculoskeletal:      Left shoulder: Tenderness present. No swelling or deformity.      Left upper arm: Normal.      Left elbow: Normal.      Left forearm: Normal.      Left wrist: Normal.      Left hand: No deformity. Normal strength. Normal sensation. Normal capillary refill. Normal pulse.      Cervical back: Spasms and tenderness present. No swelling, deformity or erythema. Pain with movement present.   Neurological:      Mental Status: She is alert.   Psychiatric:  "        Attention and Perception: She is attentive.         Speech: Speech normal.           Assessment and Plan    Diagnoses and all orders for this visit:    1. Spasm of muscle (Primary)  -     XR Spine Cervical Complete 4 or 5 View  -     CBC (No Diff)  -     Comprehensive Metabolic Panel  -     Magnesium  -     C-reactive Protein  -     Sedimentation Rate  -     Vitamin B12  -     Vitamin D,25-Hydroxy  -     methocarbamol (Robaxin) 500 MG tablet; Take 1 tablet by mouth 4 (Four) Times a Day As Needed for Muscle Spasms.  Dispense: 60 tablet; Refill: 0    2. Pain and swelling of left upper extremity  -     XR Spine Cervical Complete 4 or 5 View  -     CBC (No Diff)  -     Comprehensive Metabolic Panel  -     Magnesium  -     C-reactive Protein  -     Sedimentation Rate  -     Vitamin B12  -     Vitamin D,25-Hydroxy  -     methocarbamol (Robaxin) 500 MG tablet; Take 1 tablet by mouth 4 (Four) Times a Day As Needed for Muscle Spasms.  Dispense: 60 tablet; Refill: 0    3. Neck stiffness  -     XR Spine Cervical Complete 4 or 5 View  -     methocarbamol (Robaxin) 500 MG tablet; Take 1 tablet by mouth 4 (Four) Times a Day As Needed for Muscle Spasms.  Dispense: 60 tablet; Refill: 0    4. Rheumatoid arthritis involving both knees with positive rheumatoid factor (HCC)    5. Fibromyalgia        Advised to schedule ASAP f/u w/ Rheum.      Medications, including side effects, were discussed with the patient. Patient verbalized understanding.  The plan of care was discussed. All questions were answered. Patient verbalized understanding.        Return if symptoms worsen or fail to improve.

## 2023-01-14 LAB
25(OH)D3+25(OH)D2 SERPL-MCNC: 34.8 NG/ML (ref 30–100)
ALBUMIN SERPL-MCNC: 4.4 G/DL (ref 3.5–5.2)
ALBUMIN/GLOB SERPL: 1.4 G/DL
ALP SERPL-CCNC: 103 U/L (ref 39–117)
ALT SERPL-CCNC: 25 U/L (ref 1–33)
AST SERPL-CCNC: 18 U/L (ref 1–32)
BILIRUB SERPL-MCNC: 0.3 MG/DL (ref 0–1.2)
BUN SERPL-MCNC: 20 MG/DL (ref 6–20)
BUN/CREAT SERPL: 22.2 (ref 7–25)
CALCIUM SERPL-MCNC: 9.8 MG/DL (ref 8.6–10.5)
CHLORIDE SERPL-SCNC: 103 MMOL/L (ref 98–107)
CO2 SERPL-SCNC: 27.9 MMOL/L (ref 22–29)
CREAT SERPL-MCNC: 0.9 MG/DL (ref 0.57–1)
CRP SERPL-MCNC: <0.3 MG/DL (ref 0–0.5)
EGFRCR SERPLBLD CKD-EPI 2021: 73.8 ML/MIN/1.73
ERYTHROCYTE [DISTWIDTH] IN BLOOD BY AUTOMATED COUNT: 12.9 % (ref 12.3–15.4)
ERYTHROCYTE [SEDIMENTATION RATE] IN BLOOD BY WESTERGREN METHOD: 13 MM/HR (ref 0–30)
GLOBULIN SER CALC-MCNC: 3.2 GM/DL
GLUCOSE SERPL-MCNC: 91 MG/DL (ref 65–99)
HCT VFR BLD AUTO: 40.5 % (ref 34–46.6)
HGB BLD-MCNC: 13.2 G/DL (ref 12–15.9)
MAGNESIUM SERPL-MCNC: 2.4 MG/DL (ref 1.6–2.6)
MCH RBC QN AUTO: 27.2 PG (ref 26.6–33)
MCHC RBC AUTO-ENTMCNC: 32.6 G/DL (ref 31.5–35.7)
MCV RBC AUTO: 83.5 FL (ref 79–97)
PLATELET # BLD AUTO: 347 10*3/MM3 (ref 140–450)
POTASSIUM SERPL-SCNC: 4.2 MMOL/L (ref 3.5–5.2)
PROT SERPL-MCNC: 7.6 G/DL (ref 6–8.5)
RBC # BLD AUTO: 4.85 10*6/MM3 (ref 3.77–5.28)
SODIUM SERPL-SCNC: 140 MMOL/L (ref 136–145)
VIT B12 SERPL-MCNC: 515 PG/ML (ref 211–946)
WBC # BLD AUTO: 6.6 10*3/MM3 (ref 3.4–10.8)

## 2023-01-16 ENCOUNTER — TELEPHONE (OUTPATIENT)
Dept: INTERNAL MEDICINE | Facility: CLINIC | Age: 58
End: 2023-01-16
Payer: COMMERCIAL

## 2023-01-16 ENCOUNTER — TELEPHONE (OUTPATIENT)
Dept: ORTHOPEDIC SURGERY | Facility: CLINIC | Age: 58
End: 2023-01-16

## 2023-01-16 NOTE — TELEPHONE ENCOUNTER
Caller: SHAKEEL     Relationship to patient: SELF     Best call back number: [phone]    Type of visit: BILATERAL KNEE INJECTIONS     EARLY AM ON MONDAYS

## 2023-01-16 NOTE — TELEPHONE ENCOUNTER
I went over her results with her. Pt would like to know the next steps for her regarding the degenerative changes that are affecting her neck. Can you advise on this please?   
LVM for pt with info from Katalina's note.   
Our plan as we discussed it at her OV was to utilize Robaxin and Diclofenac for 2wks. If no improvement, would then move on to PT.  
22

## 2023-02-01 ENCOUNTER — TRANSCRIBE ORDERS (OUTPATIENT)
Dept: ADMINISTRATIVE | Facility: HOSPITAL | Age: 58
End: 2023-02-01
Payer: COMMERCIAL

## 2023-02-01 ENCOUNTER — CLINICAL SUPPORT (OUTPATIENT)
Dept: ORTHOPEDIC SURGERY | Facility: CLINIC | Age: 58
End: 2023-02-01
Payer: COMMERCIAL

## 2023-02-01 ENCOUNTER — HOSPITAL ENCOUNTER (OUTPATIENT)
Dept: GENERAL RADIOLOGY | Facility: HOSPITAL | Age: 58
Discharge: HOME | End: 2023-02-01
Admitting: INTERNAL MEDICINE
Payer: COMMERCIAL

## 2023-02-01 VITALS — WEIGHT: 189 LBS | HEIGHT: 62 IN | BODY MASS INDEX: 34.78 KG/M2

## 2023-02-01 DIAGNOSIS — Z92.25 STATUS POST RECENT IMMUNOSUPPRESSIVE THERAPY: ICD-10-CM

## 2023-02-01 DIAGNOSIS — M05.761 RHEUMATOID ARTHRITIS INVOLVING BOTH KNEES WITH POSITIVE RHEUMATOID FACTOR: Primary | ICD-10-CM

## 2023-02-01 DIAGNOSIS — M05.762 RHEUMATOID ARTHRITIS INVOLVING BOTH KNEES WITH POSITIVE RHEUMATOID FACTOR: Primary | ICD-10-CM

## 2023-02-01 DIAGNOSIS — M05.79 SEROPOSITIVE RHEUMATOID ARTHRITIS OF MULTIPLE SITES: Primary | ICD-10-CM

## 2023-02-01 DIAGNOSIS — M05.79 SEROPOSITIVE RHEUMATOID ARTHRITIS OF MULTIPLE SITES: ICD-10-CM

## 2023-02-01 PROCEDURE — 73120 X-RAY EXAM OF HAND: CPT

## 2023-02-01 PROCEDURE — 20610 DRAIN/INJ JOINT/BURSA W/O US: CPT | Performed by: ORTHOPAEDIC SURGERY

## 2023-02-01 RX ORDER — LIDOCAINE HYDROCHLORIDE 10 MG/ML
4 INJECTION, SOLUTION EPIDURAL; INFILTRATION; INTRACAUDAL; PERINEURAL
Status: COMPLETED | OUTPATIENT
Start: 2023-02-01 | End: 2023-02-01

## 2023-02-01 RX ORDER — HYDROXYCHLOROQUINE SULFATE 200 MG/1
TABLET, FILM COATED ORAL
COMMUNITY
Start: 2023-01-31

## 2023-02-01 RX ORDER — TRIAMCINOLONE ACETONIDE 40 MG/ML
80 INJECTION, SUSPENSION INTRA-ARTICULAR; INTRAMUSCULAR
Status: COMPLETED | OUTPATIENT
Start: 2023-02-01 | End: 2023-02-01

## 2023-02-01 RX ADMIN — TRIAMCINOLONE ACETONIDE 80 MG: 40 INJECTION, SUSPENSION INTRA-ARTICULAR; INTRAMUSCULAR at 15:05

## 2023-02-01 RX ADMIN — LIDOCAINE HYDROCHLORIDE 4 ML: 10 INJECTION, SOLUTION EPIDURAL; INFILTRATION; INTRACAUDAL; PERINEURAL at 15:05

## 2023-02-01 NOTE — PROGRESS NOTES
Subjective: Rheumatoid arthritis both knees     Patient ID: Fanny Winchester is a 57 y.o. female.    Chief Complaint:    History of Present Illness 57-year-old female returns for repeat cortisone injection into both knees.  The last injection given 3 months ago lasted until just recently.  She is not having severe pain but she is having difficult activities of daily living.  She has been taking the Voltaren regularly and tolerating it well.       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   • DDD (degenerative disc disease), lumbar 03/28/2022   • Fibromyalgia    • Osteoarthritis    • Rheumatoid arthritis involving multiple sites (HCC)      Past Surgical History:   Procedure Laterality Date   • COLONOSCOPY N/A 12/22/2020    Procedure: COLONOSCOPY with polypectomy;  Surgeon: Adarsh Arredondo MD;  Location: [facility] OR;  Service: Gastroenterology;  Laterality: N/A;  Rectal polyp   • ENDOSCOPY N/A 12/22/2020    Procedure: ESOPHAGOGASTRODUODENOSCOPY with biopsies;  Surgeon: Adarsh Arredondo MD;  Location: [facility] OR;  Service: Gastroenterology;  Laterality: N/A;  Reflux esophagitis  Gastritis  Duodenitis  Hiatal hernia  Biopsies: distal esophagus, gastric, duodenum   • GANGLION CYST EXCISION Left 2019   • HYSTERECTOMY  2000   • OOPHORECTOMY Right 11/2020   • TUMOR REMOVAL     • ULNAR NERVE TRANSPOSITION Left 2019     Family History   Problem Relation Age of Onset   • Heart disease Father    • Hypertension Father    • Hyperlipidemia Father    • Heart disease Mother    • Lupus Mother    • Colon cancer Maternal Aunt    • Colon polyps Maternal Aunt    •  Breast cancer Neg Hx    • Ovarian cancer Neg Hx          Objective:  There were no vitals filed for this visit.      02/01/23  1456   Weight: 85.7 kg (189 lb)     Body mass index is 34.57 kg/m².        Ortho Exam   She is alert and oriented x3.  Neither knee shows any swelling effusion erythema.  Both knees therefore were injected into the superolateral portal after sterile prep and 4 cc of lidocaine and 2 cc of Kenalog.  Postinjection instructions given to the patient.  Return to see me as needed.    Assessment:        1. Rheumatoid arthritis involving both knees with positive rheumatoid factor (HCC)           Plan: Return as needed.  Answered all questions.  Continue the Voltaren.  Discussed these injections can be given every 3 months.    Large Joint Arthrocentesis  Date/Time: 2/1/2023 3:05 PM  Consent given by: patient  Site marked: site marked  Timeout: Immediately prior to procedure a time out was called to verify the correct patient, procedure, equipment, support staff and site/side marked as required   Supporting Documentation  Indications: pain   Procedure Details  Location: knee - Knee joint: bilateral.  Preparation: Patient was prepped and draped in the usual sterile fashion  Needle size: 22 G  Approach: superior  Medications administered: 80 mg triamcinolone acetonide 40 MG/ML; 4 mL lidocaine PF 1% 1 %  Patient tolerance: patient tolerated the procedure well with no immediate complications                  Work Status:    GINA query complete.    Orders:  Orders Placed This Encounter   Procedures   • Large Joint Arthrocentesis       Medications:  No orders of the defined types were placed in this encounter.      Followup:  Return if symptoms worsen or fail to improve.          Dictated utilizing Dragon dictation

## 2023-02-28 ENCOUNTER — OFFICE VISIT (OUTPATIENT)
Dept: OBSTETRICS AND GYNECOLOGY | Facility: CLINIC | Age: 58
End: 2023-02-28
Payer: COMMERCIAL

## 2023-02-28 VITALS
WEIGHT: 182 LBS | HEIGHT: 62 IN | DIASTOLIC BLOOD PRESSURE: 86 MMHG | SYSTOLIC BLOOD PRESSURE: 126 MMHG | BODY MASS INDEX: 33.49 KG/M2

## 2023-02-28 DIAGNOSIS — Z01.419 PAP SMEAR, LOW-RISK: ICD-10-CM

## 2023-02-28 DIAGNOSIS — Z11.51 ENCOUNTER FOR SCREENING FOR HUMAN PAPILLOMAVIRUS (HPV): ICD-10-CM

## 2023-02-28 DIAGNOSIS — Z01.419 ROUTINE GYNECOLOGICAL EXAMINATION: Primary | ICD-10-CM

## 2023-02-28 PROCEDURE — 99212 OFFICE O/P EST SF 10 MIN: CPT | Performed by: OBSTETRICS & GYNECOLOGY

## 2023-03-07 LAB
CYTOLOGIST CVX/VAG CYTO: ABNORMAL
CYTOLOGY CVX/VAG DOC CYTO: ABNORMAL
CYTOLOGY CVX/VAG DOC THIN PREP: ABNORMAL
DX ICD CODE: ABNORMAL
DX ICD CODE: ABNORMAL
HIV 1 & 2 AB SER-IMP: ABNORMAL
HPV I/H RISK 4 DNA CVX QL PROBE+SIG AMP: NEGATIVE
OTHER STN SPEC: ABNORMAL
PATHOLOGIST CVX/VAG CYTO: ABNORMAL
RECOM F/U CVX/VAG CYTO: ABNORMAL
STAT OF ADQ CVX/VAG CYTO-IMP: ABNORMAL

## 2023-03-09 DIAGNOSIS — M17.0 PRIMARY OSTEOARTHRITIS OF BOTH KNEES: ICD-10-CM

## 2023-03-09 DIAGNOSIS — M51.36 DDD (DEGENERATIVE DISC DISEASE), LUMBAR: ICD-10-CM

## 2023-04-20 DIAGNOSIS — M51.36 DDD (DEGENERATIVE DISC DISEASE), LUMBAR: ICD-10-CM

## 2023-04-20 DIAGNOSIS — M17.0 PRIMARY OSTEOARTHRITIS OF BOTH KNEES: ICD-10-CM

## 2023-04-21 NOTE — TELEPHONE ENCOUNTER
Rx Refill Note  Requested Prescriptions     Pending Prescriptions Disp Refills    diclofenac (VOLTAREN) 50 MG EC tablet [Pharmacy Med Name: Diclofenac Sodium 50 MG Oral Tablet Delayed Release] 60 tablet 0     Sig: Take 1 tablet by mouth twice daily as needed      Last office visit with prescribing clinician: 1/13/2023   Last telemedicine visit with prescribing clinician: Visit date not found   Next office visit with prescribing clinician: Visit date not found                         Would you like a call back once the refill request has been completed: [] Yes [] No    If the office needs to give you a call back, can they leave a voicemail: [] Yes [] No    Rosa Garay MA  04/21/23, 10:02 EDT

## 2023-04-24 ENCOUNTER — TELEPHONE (OUTPATIENT)
Dept: INTERNAL MEDICINE | Facility: CLINIC | Age: 58
End: 2023-04-24
Payer: COMMERCIAL

## 2023-05-26 ENCOUNTER — OFFICE VISIT (OUTPATIENT)
Dept: INTERNAL MEDICINE | Facility: CLINIC | Age: 58
End: 2023-05-26
Payer: COMMERCIAL

## 2023-05-26 VITALS
BODY MASS INDEX: 34.56 KG/M2 | OXYGEN SATURATION: 99 % | DIASTOLIC BLOOD PRESSURE: 78 MMHG | TEMPERATURE: 97.5 F | WEIGHT: 187.8 LBS | SYSTOLIC BLOOD PRESSURE: 118 MMHG | HEART RATE: 78 BPM | HEIGHT: 62 IN

## 2023-05-26 DIAGNOSIS — Z23 NEED FOR TDAP VACCINATION: ICD-10-CM

## 2023-05-26 DIAGNOSIS — H61.21 HEARING LOSS OF RIGHT EAR DUE TO CERUMEN IMPACTION: ICD-10-CM

## 2023-05-26 DIAGNOSIS — Z00.00 ENCOUNTER FOR WELLNESS EXAMINATION IN ADULT: Primary | ICD-10-CM

## 2023-05-26 NOTE — PROGRESS NOTES
"FREDRICK Escobedo is a 57 y.o. female presenting for Annual Exam    Her current/chronic health conditions include:  Patient Active Problem List   Diagnosis   • Gastroesophageal reflux disease with esophagitis without hemorrhage   • Fibromyalgia   • Rheumatoid arthritis   • DDD (degenerative disc disease), lumbar   • Primary osteoarthritis of both knees   • Elevated fasting glucose   • Mixed hyperlipidemia   • LGSIL Pap smear of vagina       Outpatient Medications Marked as Taking for the 5/26/23 encounter (Office Visit) with Katalina Garcia APRN   Medication Sig Dispense Refill   • diclofenac (VOLTAREN) 50 MG EC tablet Take 1 tablet by mouth twice daily as needed 60 tablet 0   • hydroxychloroquine (PLAQUENIL) 200 MG tablet            Health Habits:  Nutrition: \"I don't eat a lot.\" breakfast is usually cheese and crackers; lunch usually consists of chicken and green beans; dinner is hit or miss, eating out maybe once a week, at home is chicken and vegetables sometimes w/ rice  Exercise: 0 times/week.  Fluid intake is coffee w/ french vanilla creamer and sweet tea    Screenings:  PAP: UTD per GYN  Mammogram: UTD per GYN  Dexa: n/a  Colon Cancer: CLS in 2020 w/ 5yr recall  Tob use: n/a      The following portions of the patient's history were reviewed and updated as appropriate: allergies, current medications, problem list, past medical history, past family history, and past social history.        OBJECTIVE    Vitals:    05/26/23 1501 05/26/23 1538   BP: 142/78 118/78   Pulse: 78    Temp: 97.5 °F (36.4 °C)    TempSrc: Infrared    SpO2: 99%    Weight: 85.2 kg (187 lb 12.8 oz)    Height: 157.5 cm (62.01\")        BP Readings from Last 3 Encounters:   05/26/23 118/78   02/28/23 126/86   01/13/23 138/82        Wt Readings from Last 3 Encounters:   05/26/23 85.2 kg (187 lb 12.8 oz)   02/28/23 82.6 kg (182 lb)   02/01/23 85.7 kg (189 lb)        Body mass index is 34.34 kg/m².    Nursing notes and vital signs " reviewed.      Physical Exam  Constitutional:       General: She is not in acute distress.     Appearance: She is well-developed.   HENT:      Head: Normocephalic.      Right Ear: External ear normal. There is impacted cerumen.      Left Ear: Hearing, tympanic membrane, ear canal and external ear normal.      Nose: Nose normal. No mucosal edema or rhinorrhea.      Mouth/Throat:      Mouth: Mucous membranes are moist.      Pharynx: Oropharynx is clear. Uvula midline.   Eyes:      General: Lids are normal.      Extraocular Movements: Extraocular movements intact.      Conjunctiva/sclera: Conjunctivae normal.      Pupils: Pupils are equal, round, and reactive to light.   Neck:      Thyroid: No thyroid mass or thyromegaly.   Cardiovascular:      Rate and Rhythm: Regular rhythm.      Pulses: Normal pulses.      Heart sounds: S1 normal and S2 normal. No murmur heard.    No friction rub. No gallop.   Pulmonary:      Effort: Pulmonary effort is normal.      Breath sounds: Normal breath sounds. No wheezing, rhonchi or rales.   Abdominal:      General: Bowel sounds are normal.      Palpations: Abdomen is soft.      Tenderness: There is no abdominal tenderness. There is no guarding.      Hernia: No hernia is present.   Musculoskeletal:         General: No deformity. Normal range of motion.      Cervical back: Normal range of motion and neck supple.   Lymphadenopathy:      Cervical: No cervical adenopathy.   Skin:     General: Skin is warm and dry.      Findings: No lesion or rash.          Neurological:      General: No focal deficit present.      Mental Status: She is alert and oriented to person, place, and time.      Cranial Nerves: No cranial nerve deficit.      Sensory: No sensory deficit.      Motor: Motor function is intact.      Coordination: Coordination is intact.      Gait: Gait normal.      Deep Tendon Reflexes: Reflexes are normal and symmetric.   Psychiatric:         Attention and Perception: She is attentive.          Mood and Affect: Mood and affect normal.         Speech: Speech normal.         Behavior: Behavior normal.         Thought Content: Thought content normal.         No results found for this or any previous visit (from the past 672 hour(s)).      ASSESSMENT AND PLAN    Diagnoses and all orders for this visit:    1. Encounter for wellness examination in adult (Primary)  -     CBC (No Diff)  -     Comprehensive Metabolic Panel  -     Lipid Panel With / Chol / HDL Ratio  -     TSH    2. Need for Tdap vaccination  -     Tdap Vaccine Greater Than or Equal To 8yo IM    3. Hearing loss of right ear due to cerumen impaction    Other orders  -     Ear Cerumen Removal        Preventative counseling completed including relevant screenings, appropriate vaccinations, healthy nutrition, and appropriate physical activity. Age-appropriate Screening & Interventions recommended by USPSTF were reviewed with the patient, and Health Maintenance was updated in Epic.  BMI is >= 30 and <35. (Class 1 Obesity). The following options were offered after discussion;: exercise counseling/recommendations and nutrition counseling/recommendations  I counseled this patient regarding the use of an jolynn such as MyFitness Pal for logging caloric and fluid intake. Counseling also included this importance of weighing and measuring portions.      Ear Cerumen Removal    Date/Time: 5/26/2023 3:58 PM  Performed by: Katalina Garcia APRN  Authorized by: Katalina Garcia APRN   Consent: Verbal consent obtained.  Risks and benefits: risks, benefits and alternatives were discussed  Consent given by: patient  Patient understanding: patient states understanding of the procedure being performed    Anesthesia:  Local Anesthetic: none  Location details: right ear  Patient tolerance: patient tolerated the procedure well with no immediate complications  Procedure type: irrigation         Medications, including side effects, were discussed with the  patient. Patient verbalized understanding.  The plan of care was discussed. All questions were answered. Patient verbalized understanding.        Return in about 1 year (around 5/26/2024) for fasting labs one week prior to, Annual physical., or sooner if needed.

## 2023-05-27 LAB
ALBUMIN SERPL-MCNC: 4.3 G/DL (ref 3.5–5.2)
ALBUMIN/GLOB SERPL: 1.5 G/DL
ALP SERPL-CCNC: 77 U/L (ref 39–117)
ALT SERPL-CCNC: 31 U/L (ref 1–33)
AST SERPL-CCNC: 24 U/L (ref 1–32)
BILIRUB SERPL-MCNC: 0.5 MG/DL (ref 0–1.2)
BUN SERPL-MCNC: 16 MG/DL (ref 6–20)
BUN/CREAT SERPL: 25 (ref 7–25)
CALCIUM SERPL-MCNC: 10 MG/DL (ref 8.6–10.5)
CHLORIDE SERPL-SCNC: 102 MMOL/L (ref 98–107)
CHOLEST SERPL-MCNC: 248 MG/DL (ref 0–200)
CHOLEST/HDLC SERPL: 3.88 {RATIO}
CO2 SERPL-SCNC: 28.1 MMOL/L (ref 22–29)
CREAT SERPL-MCNC: 0.64 MG/DL (ref 0.57–1)
EGFRCR SERPLBLD CKD-EPI 2021: 101.9 ML/MIN/1.73
ERYTHROCYTE [DISTWIDTH] IN BLOOD BY AUTOMATED COUNT: 12.6 % (ref 12.3–15.4)
GLOBULIN SER CALC-MCNC: 2.8 GM/DL
GLUCOSE SERPL-MCNC: 85 MG/DL (ref 65–99)
HCT VFR BLD AUTO: 40.1 % (ref 34–46.6)
HDLC SERPL-MCNC: 64 MG/DL (ref 40–60)
HGB BLD-MCNC: 13 G/DL (ref 12–15.9)
LDLC SERPL CALC-MCNC: 164 MG/DL (ref 0–100)
MCH RBC QN AUTO: 26.9 PG (ref 26.6–33)
MCHC RBC AUTO-ENTMCNC: 32.4 G/DL (ref 31.5–35.7)
MCV RBC AUTO: 82.9 FL (ref 79–97)
PLATELET # BLD AUTO: 292 10*3/MM3 (ref 140–450)
POTASSIUM SERPL-SCNC: 4.5 MMOL/L (ref 3.5–5.2)
PROT SERPL-MCNC: 7.1 G/DL (ref 6–8.5)
RBC # BLD AUTO: 4.84 10*6/MM3 (ref 3.77–5.28)
SODIUM SERPL-SCNC: 139 MMOL/L (ref 136–145)
TRIGL SERPL-MCNC: 113 MG/DL (ref 0–150)
TSH SERPL DL<=0.005 MIU/L-ACNC: 1.34 UIU/ML (ref 0.27–4.2)
VLDLC SERPL CALC-MCNC: 20 MG/DL (ref 5–40)
WBC # BLD AUTO: 5.98 10*3/MM3 (ref 3.4–10.8)

## 2023-05-31 ENCOUNTER — TELEPHONE (OUTPATIENT)
Dept: ORTHOPEDIC SURGERY | Facility: CLINIC | Age: 58
End: 2023-05-31

## 2023-05-31 NOTE — TELEPHONE ENCOUNTER
Caller: Fanny Winchester    Relationship to patient: Self    Best call back number:     Chief complaint: BILATERAL KNEE PAIN    Type of visit: CORTISONE INJECTIONS    Requested date: 6/7/23 AS LATE AS POSSIBLE

## 2023-06-07 ENCOUNTER — OFFICE VISIT (OUTPATIENT)
Dept: ORTHOPEDIC SURGERY | Facility: CLINIC | Age: 58
End: 2023-06-07
Payer: COMMERCIAL

## 2023-06-07 VITALS — WEIGHT: 187 LBS | HEIGHT: 62 IN | BODY MASS INDEX: 34.41 KG/M2

## 2023-06-07 DIAGNOSIS — M17.0 PRIMARY OSTEOARTHRITIS OF BOTH KNEES: ICD-10-CM

## 2023-06-07 DIAGNOSIS — M51.36 DDD (DEGENERATIVE DISC DISEASE), LUMBAR: ICD-10-CM

## 2023-06-07 DIAGNOSIS — M05.761 RHEUMATOID ARTHRITIS INVOLVING BOTH KNEES WITH POSITIVE RHEUMATOID FACTOR: Primary | ICD-10-CM

## 2023-06-07 DIAGNOSIS — M05.762 RHEUMATOID ARTHRITIS INVOLVING BOTH KNEES WITH POSITIVE RHEUMATOID FACTOR: Primary | ICD-10-CM

## 2023-06-07 RX ORDER — LIDOCAINE HYDROCHLORIDE 10 MG/ML
4 INJECTION, SOLUTION EPIDURAL; INFILTRATION; INTRACAUDAL; PERINEURAL
Status: COMPLETED | OUTPATIENT
Start: 2023-06-07 | End: 2023-06-07

## 2023-06-07 RX ORDER — TRIAMCINOLONE ACETONIDE 40 MG/ML
80 INJECTION, SUSPENSION INTRA-ARTICULAR; INTRAMUSCULAR
Status: COMPLETED | OUTPATIENT
Start: 2023-06-07 | End: 2023-06-07

## 2023-06-07 RX ADMIN — LIDOCAINE HYDROCHLORIDE 4 ML: 10 INJECTION, SOLUTION EPIDURAL; INFILTRATION; INTRACAUDAL; PERINEURAL at 15:12

## 2023-06-07 RX ADMIN — TRIAMCINOLONE ACETONIDE 80 MG: 40 INJECTION, SUSPENSION INTRA-ARTICULAR; INTRAMUSCULAR at 15:13

## 2023-06-07 RX ADMIN — LIDOCAINE HYDROCHLORIDE 4 ML: 10 INJECTION, SOLUTION EPIDURAL; INFILTRATION; INTRACAUDAL; PERINEURAL at 15:13

## 2023-06-07 RX ADMIN — TRIAMCINOLONE ACETONIDE 80 MG: 40 INJECTION, SUSPENSION INTRA-ARTICULAR; INTRAMUSCULAR at 15:12

## 2023-06-07 NOTE — PROGRESS NOTES
Subjective: Rheumatoid arthritis both knees     Patient ID: Fanny Winchester is a 57 y.o. female.    Chief Complaint:    History of Present Illness 57-year-old female returns today to undergo repeat cortisone injection into both knees.  The last injection given in February lasted until just recently.  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    DDD (degenerative disc disease), lumbar 03/28/2022    Fibromyalgia     Osteoarthritis     Rheumatoid arthritis involving multiple sites      Past Surgical History:   Procedure Laterality Date    COLONOSCOPY N/A 12/22/2020    Procedure: COLONOSCOPY with polypectomy;  Surgeon: Adarsh Arredondo MD;  Location: [facility] OR;  Service: Gastroenterology;  Laterality: N/A;  Rectal polyp    ENDOSCOPY N/A 12/22/2020    Procedure: ESOPHAGOGASTRODUODENOSCOPY with biopsies;  Surgeon: Adarsh Arredondo MD;  Location: [facility] OR;  Service: Gastroenterology;  Laterality: N/A;  Reflux esophagitis  Gastritis  Duodenitis  Hiatal hernia  Biopsies: distal esophagus, gastric, duodenum    GANGLION CYST EXCISION Left 2019    HYSTERECTOMY  2000    OOPHORECTOMY Right 11/2020    TUMOR REMOVAL      ULNAR NERVE TRANSPOSITION Left 2019     Family History   Problem Relation Age of Onset    Heart disease Father     Hypertension Father     Hyperlipidemia Father     Heart disease Mother     Lupus Mother     Colon cancer Maternal Aunt     Colon polyps Maternal Aunt     Breast cancer Neg Hx     Ovarian cancer Neg Hx          Objective:  There were no vitals filed for this visit.       06/07/23  1455   Weight: 84.8 kg (187 lb)     Body mass index is 34.19 kg/m².        Ortho Exam   she is alert and oriented x3.  AP lateral sunrise view of the knee does show tricompartmental arthritis particular in the medial patellofemoral compartments.  Neither knee shows any swelling effusion or erythema.  The knees are cool to touch.  Both knees therefore injected through the superolateral portal 4 cc of lidocaine and 2 cc of Kenalog.  Postinjection instructions given to the patient.    Assessment:        1. Rheumatoid arthritis involving both knees with positive rheumatoid factor           Plan: Return to see me as needed.  As discussed these injections can be repeated every 3 months.  Patient was in agreement.  Answered all questions     Large Joint Arthrocentesis: L knee  Date/Time: 6/7/2023 3:12 PM  Consent given by: patient  Site marked: site marked  Timeout: Immediately prior to procedure a time out was called to verify the correct patient, procedure, equipment, support staff and site/side marked as required   Supporting Documentation  Indications: pain   Procedure Details  Location: knee - L knee  Preparation: Patient was prepped and draped in the usual sterile fashion  Needle size: 22 G  Approach: superior  Medications administered: 80 mg triamcinolone acetonide 40 MG/ML; 4 mL lidocaine PF 1% 1 %  Patient tolerance: patient tolerated the procedure well with no immediate complications      Large Joint Arthrocentesis: R knee  Date/Time: 6/7/2023 3:13 PM  Consent given by: patient  Site marked: site marked  Timeout: Immediately prior to procedure a time out was called to verify the correct patient, procedure, equipment, support staff and site/side marked as required   Supporting Documentation  Indications: pain   Procedure Details  Location: knee - R knee  Preparation: Patient was prepped and draped in the usual sterile fashion  Needle size: 22 G  Approach: superior  Medications administered: 80 mg  triamcinolone acetonide 40 MG/ML; 4 mL lidocaine PF 1% 1 %  Patient tolerance: patient tolerated the procedure well with no immediate complications              Work Status:    GINA query complete.    Orders:  Orders Placed This Encounter   Procedures    Large Joint Arthrocentesis: L knee    Large Joint Arthrocentesis: R knee    XR Knee 3 View Bilateral       Medications:  No orders of the defined types were placed in this encounter.      Followup:  Return if symptoms worsen or fail to improve.          Dictated utilizing Dragon dictation

## 2023-07-25 DIAGNOSIS — M17.0 PRIMARY OSTEOARTHRITIS OF BOTH KNEES: ICD-10-CM

## 2023-07-25 DIAGNOSIS — M51.36 DDD (DEGENERATIVE DISC DISEASE), LUMBAR: ICD-10-CM

## 2023-07-25 NOTE — TELEPHONE ENCOUNTER
Rx Refill Note  Requested Prescriptions     Pending Prescriptions Disp Refills    diclofenac (VOLTAREN) 50 MG EC tablet [Pharmacy Med Name: Diclofenac Sodium 50 MG Oral Tablet Delayed Release] 60 tablet 0     Sig: Take 1 tablet by mouth twice daily as needed      Last office visit with prescribing clinician: 5/26/2023   Last telemedicine visit with prescribing clinician: Visit date not found   Next office visit with prescribing clinician: Visit date not found                         Would you like a call back once the refill request has been completed: [] Yes [] No    If the office needs to give you a call back, can they leave a voicemail: [] Yes [] No    Rosa Garay MA  07/25/23, 11:27 EDT

## 2023-07-28 ENCOUNTER — TELEPHONE (OUTPATIENT)
Dept: ORTHOPEDIC SURGERY | Facility: CLINIC | Age: 58
End: 2023-07-28
Payer: COMMERCIAL

## 2023-12-06 ENCOUNTER — TELEPHONE (OUTPATIENT)
Dept: ORTHOPEDIC SURGERY | Facility: CLINIC | Age: 58
End: 2023-12-06
Payer: COMMERCIAL

## 2023-12-06 NOTE — TELEPHONE ENCOUNTER
Caller: Fanny Winchester    Relationship to patient: Self    Best call back number:     Chief complaint: BILATERAL KNEES    Type of visit: FUP GEL INJECTIONS

## 2024-01-24 ENCOUNTER — OFFICE VISIT (OUTPATIENT)
Dept: ORTHOPEDIC SURGERY | Facility: CLINIC | Age: 59
End: 2024-01-24
Payer: COMMERCIAL

## 2024-01-24 VITALS — WEIGHT: 191.2 LBS | HEIGHT: 62 IN | BODY MASS INDEX: 35.19 KG/M2

## 2024-01-24 DIAGNOSIS — M05.761 RHEUMATOID ARTHRITIS INVOLVING BOTH KNEES WITH POSITIVE RHEUMATOID FACTOR: Primary | ICD-10-CM

## 2024-01-24 DIAGNOSIS — M05.762 RHEUMATOID ARTHRITIS INVOLVING BOTH KNEES WITH POSITIVE RHEUMATOID FACTOR: Primary | ICD-10-CM

## 2024-01-24 RX ORDER — TRIAMCINOLONE ACETONIDE 40 MG/ML
80 INJECTION, SUSPENSION INTRA-ARTICULAR; INTRAMUSCULAR
Status: COMPLETED | OUTPATIENT
Start: 2024-01-24 | End: 2024-01-24

## 2024-01-24 RX ORDER — LIDOCAINE HYDROCHLORIDE 10 MG/ML
4 INJECTION, SOLUTION EPIDURAL; INFILTRATION; INTRACAUDAL; PERINEURAL
Status: COMPLETED | OUTPATIENT
Start: 2024-01-24 | End: 2024-01-24

## 2024-01-24 RX ADMIN — TRIAMCINOLONE ACETONIDE 80 MG: 40 INJECTION, SUSPENSION INTRA-ARTICULAR; INTRAMUSCULAR at 14:24

## 2024-01-24 RX ADMIN — LIDOCAINE HYDROCHLORIDE 4 ML: 10 INJECTION, SOLUTION EPIDURAL; INFILTRATION; INTRACAUDAL; PERINEURAL at 14:24

## 2024-01-24 NOTE — PROGRESS NOTES
Subjective: Bilateral knee pain     Patient ID: Fanny Winchester is a 58 y.o. female.    Chief Complaint:    History of Present Illness 58-year-old female serological positive rheumatoid arthritis involving both knees presents today for treatment recommendations.  Cortisone injection previously given to give her relief for maybe 6 to 8 weeks and she is once again having moderate pain.  Last injection was given in June 2023.  Her insurance however does not pay for gel injections.  She currently taking Voltaren and Plaquenil for management of rheumatoid arthritis.  Again she is having pain on a daily basis making all activities difficult if not impossible.       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    DDD (degenerative disc disease), lumbar 03/28/2022    Fibromyalgia     Osteoarthritis     Rheumatoid arthritis involving multiple sites      Past Surgical History:   Procedure Laterality Date    COLONOSCOPY N/A 12/22/2020    Procedure: COLONOSCOPY with polypectomy;  Surgeon: Adarsh Arredondo MD;  Location: [facility];  Service: Gastroenterology;  Laterality: N/A;  Rectal polyp    ENDOSCOPY N/A 12/22/2020    Procedure: ESOPHAGOGASTRODUODENOSCOPY with biopsies;  Surgeon: Adarsh Arredondo MD;  Location: [facility];  Service: Gastroenterology;  Laterality: N/A;  Reflux esophagitis  Gastritis  Duodenitis  Hiatal hernia  Biopsies: distal esophagus, gastric, duodenum    GANGLION CYST EXCISION Left 2019    HYSTERECTOMY  2000    OOPHORECTOMY Right 11/2020    TUMOR REMOVAL      ULNAR NERVE TRANSPOSITION Left 2019     Family History   Problem Relation Age of  Onset    Heart disease Father     Hypertension Father     Hyperlipidemia Father     Heart disease Mother     Lupus Mother     Colon cancer Maternal Aunt     Colon polyps Maternal Aunt     Breast cancer Neg Hx     Ovarian cancer Neg Hx          Objective:  There were no vitals filed for this visit.      01/24/24  1418   Weight: 86.7 kg (191 lb 3.2 oz)     Body mass index is 34.96 kg/m².        Ortho Exam   she is alert and oriented x 3.  Neither knee shows any swelling effusion or erythema.  There is moderate crepitus with range of motion to motion which is 0 to 125 degrees.  Quad hamstring function 5/5.  No instability in flexion or extension in either knee nor any varus or valgus instability in either knee at 10 to 30 degrees.  There is marked medial joint line tenderness bilaterally but negative Ilan's.  Her calf is nontender.  Distal pulses normal sensory deficit.  She is tolerating the Voltaren and the Plaquenil but again is again not getting substantial relief of her pain from the rheumatoid arthritis involving her knees.    Assessment:        1. Rheumatoid arthritis involving both knees with positive rheumatoid factor           Plan: Treatment options with the patient.  Again her insurance does not cover the Voltaren gel and she is not ready to consider surgery at this time and although cortisone does not give her long-term relief i.e. 3 months or longer working to proceed with bilateral cortisone injections today she is having significant pain.  Both knees therefore injected through the superolateral portal sterile prep and 4 cc lidocaine and 2 cc Kenalog.  Postinjection instructions given.  Return to see me as needed.  Answered all questions      - Large Joint Arthrocentesis: bilateral knee on 1/24/2024 2:24 PM  Indications: pain  Details: 22 G needle, superolateral approach  Medications (Right): 4 mL lidocaine PF 1% 1 %; 80 mg triamcinolone acetonide 40 MG/ML  Medications (Left): 4 mL lidocaine PF 1% 1  %; 80 mg triamcinolone acetonide 40 MG/ML  Outcome: tolerated well, no immediate complications  Procedure, treatment alternatives, risks and benefits explained, specific risks discussed. Consent was given by the patient. Immediately prior to procedure a time out was called to verify the correct patient, procedure, equipment, support staff and site/side marked as required. Patient was prepped and draped in the usual sterile fashion.             Work Status:    GINA query complete.    Orders:  Orders Placed This Encounter   Procedures    - Large Joint Arthrocentesis: bilateral knee       Medications:  No orders of the defined types were placed in this encounter.      Followup:  Return if symptoms worsen or fail to improve.          Dictated utilizing Dragon dictation

## 2024-03-05 ENCOUNTER — OFFICE VISIT (OUTPATIENT)
Dept: OBSTETRICS AND GYNECOLOGY | Facility: CLINIC | Age: 59
End: 2024-03-05
Payer: COMMERCIAL

## 2024-03-05 VITALS
DIASTOLIC BLOOD PRESSURE: 74 MMHG | HEIGHT: 62 IN | WEIGHT: 189 LBS | SYSTOLIC BLOOD PRESSURE: 120 MMHG | BODY MASS INDEX: 34.78 KG/M2

## 2024-03-05 DIAGNOSIS — Z11.51 SPECIAL SCREENING EXAMINATION FOR HUMAN PAPILLOMAVIRUS (HPV): ICD-10-CM

## 2024-03-05 DIAGNOSIS — Z01.419 PAP SMEAR, AS PART OF ROUTINE GYNECOLOGICAL EXAMINATION: ICD-10-CM

## 2024-03-05 DIAGNOSIS — Z01.419 ROUTINE GYNECOLOGICAL EXAMINATION: Primary | ICD-10-CM

## 2024-03-05 LAB
BILIRUB BLD-MCNC: NEGATIVE MG/DL
CLARITY, POC: CLEAR
COLOR UR: YELLOW
GLUCOSE UR STRIP-MCNC: NEGATIVE MG/DL
KETONES UR QL: NEGATIVE
LEUKOCYTE EST, POC: NEGATIVE
NITRITE UR-MCNC: NEGATIVE MG/ML
PH UR: 5 [PH] (ref 5–8)
PROT UR STRIP-MCNC: NEGATIVE MG/DL
RBC # UR STRIP: NEGATIVE /UL
SP GR UR: 1.03 (ref 1–1.03)
UROBILINOGEN UR QL: NORMAL

## 2024-03-05 PROCEDURE — 99396 PREV VISIT EST AGE 40-64: CPT | Performed by: OBSTETRICS & GYNECOLOGY

## 2024-03-05 PROCEDURE — 81002 URINALYSIS NONAUTO W/O SCOPE: CPT | Performed by: OBSTETRICS & GYNECOLOGY

## 2024-03-05 RX ORDER — DICLOFENAC SODIUM 75 MG/1
1 TABLET, DELAYED RELEASE ORAL EVERY 12 HOURS SCHEDULED
COMMUNITY
Start: 2024-01-28

## 2024-03-05 NOTE — PROGRESS NOTES
GYN Annual Exam     CC- Here for annual exam.     Fanny Winchester is a 58 y.o. female who presents for annual well woman exam. Periods are absent.     OB History          2    Para   2    Term   2            AB        Living   2         SAB        IAB        Ectopic        Molar        Multiple        Live Births                    Current contraception: status post hysterectomy  History of abnormal Pap smear: yes - cuff was LG but last pap HPV neg  Family history of uterine, colon or ovarian cancer: no  History of abnormal mammogram: no  Family history of breast cancer: no  Last Pap :  ASCUS neg HPV of cuff    Past Medical History:   Diagnosis Date    Anxiety     Mason tumor 2020    DDD (degenerative disc disease), lumbar 2022    Fibromyalgia     Osteoarthritis     Rheumatoid arthritis involving multiple sites        Past Surgical History:   Procedure Laterality Date    COLONOSCOPY N/A 2020    Procedure: COLONOSCOPY with polypectomy;  Surgeon: Adarsh Arredondo MD;  Location: [facility];  Service: Gastroenterology;  Laterality: N/A;  Rectal polyp    ENDOSCOPY N/A 2020    Procedure: ESOPHAGOGASTRODUODENOSCOPY with biopsies;  Surgeon: Adarsh Arredondo MD;  Location: [facility];  Service: Gastroenterology;  Laterality: N/A;  Reflux esophagitis  Gastritis  Duodenitis  Hiatal hernia  Biopsies: distal esophagus, gastric, duodenum    GANGLION CYST EXCISION Left 2019    HYSTERECTOMY  2000    OOPHORECTOMY Right 2020    TUMOR REMOVAL      ULNAR NERVE TRANSPOSITION Left 2019         Current Outpatient Medications:     diclofenac (VOLTAREN) 75 MG EC tablet, Take 1 tablet by mouth Every 12 (Twelve) Hours., Disp: , Rfl:     hydroxychloroquine (PLAQUENIL) 200 MG tablet, , Disp: , Rfl:     diclofenac (VOLTAREN) 50 MG EC tablet, Take 1 tablet by mouth twice daily as needed (Patient not taking: Reported on 3/5/2024), Disp: 60 tablet, Rfl: 5    No Known  "Allergies    Social History     Tobacco Use    Smoking status: Never    Smokeless tobacco: Never   Vaping Use    Vaping status: Never Used   Substance Use Topics    Alcohol use: Yes     Comment: Occasuional    Drug use: Never         Family History   Problem Relation Age of Onset    Heart disease Father     Hypertension Father     Hyperlipidemia Father     Heart disease Mother     Lupus Mother     Colon cancer Maternal Aunt     Colon polyps Maternal Aunt     Breast cancer Neg Hx     Ovarian cancer Neg Hx        Review of Systems   Constitutional:  Negative for appetite change, fever and unexpected weight change.   HENT:  Negative for congestion and sore throat.    Respiratory:  Negative for cough and shortness of breath.    Cardiovascular:  Negative for chest pain and palpitations.   Gastrointestinal:  Negative for abdominal distention, abdominal pain, constipation, diarrhea, nausea and vomiting.   Endocrine: Negative.    Genitourinary:  Negative for dyspareunia, menstrual problem, pelvic pain and vaginal discharge.   Skin: Negative.    Neurological:  Negative for dizziness and syncope.   Hematological: Negative.    Psychiatric/Behavioral:  Negative for dysphoric mood and sleep disturbance. The patient is not nervous/anxious.      /74   Ht 157.5 cm (62.01\")   Wt 85.7 kg (189 lb)   BMI 34.56 kg/m²     Physical Exam  Vitals and nursing note reviewed. Exam conducted with a chaperone present.   Constitutional:       Appearance: She is well-developed.   HENT:      Head: Normocephalic and atraumatic.   Neck:      Thyroid: No thyromegaly.   Cardiovascular:      Rate and Rhythm: Normal rate and regular rhythm.   Pulmonary:      Effort: Pulmonary effort is normal.      Breath sounds: Normal breath sounds.   Chest:   Breasts:     Breasts are symmetrical.      Right: No mass or nipple discharge.      Left: No mass or nipple discharge.   Abdominal:      General: Bowel sounds are normal. There is no distension.      " Palpations: Abdomen is soft. There is no mass.      Tenderness: There is no abdominal tenderness. There is no guarding or rebound.   Genitourinary:     General: Normal vulva.      Exam position: Supine.      Labia:         Right: No lesion.         Left: No lesion.       Vagina: Prolapsed vaginal walls (rectocele) present. No vaginal discharge, erythema, tenderness, bleeding or lesions.      Uterus: Absent.       Adnexa:         Right: No mass or tenderness.          Left: No mass or tenderness.     Musculoskeletal:         General: Normal range of motion.      Cervical back: Normal range of motion and neck supple.   Skin:     General: Skin is warm and dry.   Neurological:      Mental Status: She is alert and oriented to person, place, and time.   Psychiatric:         Behavior: Behavior normal.         Thought Content: Thought content normal.         Judgment: Judgment normal.     Diagnoses and all orders for this visit:    Routine gynecological examination  -     POC Urinalysis Dipstick  -     IGP, Apt HPV,rfx 16 / 18,45  -     Mammo Screening Digital Tomosynthesis Bilateral With CAD; Future    Special screening examination for human papillomavirus (HPV)  -     IGP, Apt HPV,rfx 16 / 18,45    Pap smear, as part of routine gynecological examination  -     IGP, Apt HPV,rfx 16 / 18,45    Other orders  -     diclofenac (VOLTAREN) 75 MG EC tablet; Take 1 tablet by mouth Every 12 (Twelve) Hours.        Assessment     1) GYN annual well woman exam.   2) follow-up Pap of cuff     Plan     1) Breast Health - Clinical breast exam & mammogram yearly, Self breast awareness monthly  2) Pap - done today  3) Smoking status - Fanny Winchester  reports that she has never smoked. She has never used smokeless tobacco.. I have educated her on the risk of diseases from using tobacco products such as cancer, COPD, and heart disease.   4) Colon health - screening colonoscopy recommended if not up to date  5) Bone health - Weight bearing  exercise, dietary calcium recommendations and vitamin D reviewed.   6) Seat belts recommended  7) Follow up prn and one year    Encounter Diagnoses   Name Primary?    Routine gynecological examination Yes    Special screening examination for human papillomavirus (HPV)     Pap smear, as part of routine gynecological examination          Ed Espinosa MD  3/5/2024  14:26 EST

## 2024-03-08 LAB
CYTOLOGIST CVX/VAG CYTO: ABNORMAL
CYTOLOGY CVX/VAG DOC CYTO: ABNORMAL
CYTOLOGY CVX/VAG DOC THIN PREP: ABNORMAL
DX ICD CODE: ABNORMAL
DX ICD CODE: ABNORMAL
HPV I/H RISK 4 DNA CVX QL PROBE+SIG AMP: NEGATIVE
Lab: ABNORMAL
OTHER STN SPEC: ABNORMAL
PATHOLOGIST CVX/VAG CYTO: ABNORMAL
STAT OF ADQ CVX/VAG CYTO-IMP: ABNORMAL

## 2024-04-10 ENCOUNTER — HOSPITAL ENCOUNTER (OUTPATIENT)
Dept: MAMMOGRAPHY | Facility: HOSPITAL | Age: 59
Discharge: HOME | End: 2024-04-10
Admitting: OBSTETRICS & GYNECOLOGY
Payer: COMMERCIAL

## 2024-04-10 DIAGNOSIS — Z01.419 ROUTINE GYNECOLOGICAL EXAMINATION: ICD-10-CM

## 2024-04-10 PROCEDURE — 77067 SCR MAMMO BI INCL CAD: CPT | Performed by: RADIOLOGY

## 2024-04-10 PROCEDURE — 77063 BREAST TOMOSYNTHESIS BI: CPT | Performed by: RADIOLOGY

## 2024-04-10 PROCEDURE — 77067 SCR MAMMO BI INCL CAD: CPT

## 2024-04-10 PROCEDURE — 77063 BREAST TOMOSYNTHESIS BI: CPT

## 2024-04-30 ENCOUNTER — TELEPHONE (OUTPATIENT)
Dept: ORTHOPEDIC SURGERY | Facility: CLINIC | Age: 59
End: 2024-04-30
Payer: COMMERCIAL

## 2024-04-30 NOTE — TELEPHONE ENCOUNTER
----- Message from Mandy CALDERÓN sent at 4/30/2024  2:05 PM EDT -----  Regarding: Appointment Request  Contact: [phone]  Appointment Request From: Fanny Winchester    With Provider: Tony Berg [[facility]]    Preferred Date Range: Any date 5/1/2024 or later    Preferred Times: Wednesday Afternoon    Reason for visit: Follow-up    Comments:  I need cortisone shots in my knees again

## 2024-05-15 ENCOUNTER — OFFICE VISIT (OUTPATIENT)
Dept: ORTHOPEDIC SURGERY | Facility: CLINIC | Age: 59
End: 2024-05-15
Payer: COMMERCIAL

## 2024-05-15 VITALS — BODY MASS INDEX: 34.78 KG/M2 | HEIGHT: 62 IN | WEIGHT: 189 LBS

## 2024-05-15 DIAGNOSIS — M05.761 RHEUMATOID ARTHRITIS INVOLVING BOTH KNEES WITH POSITIVE RHEUMATOID FACTOR: Primary | ICD-10-CM

## 2024-05-15 DIAGNOSIS — M05.762 RHEUMATOID ARTHRITIS INVOLVING BOTH KNEES WITH POSITIVE RHEUMATOID FACTOR: Primary | ICD-10-CM

## 2024-05-15 RX ORDER — LIDOCAINE HYDROCHLORIDE 10 MG/ML
4 INJECTION, SOLUTION EPIDURAL; INFILTRATION; INTRACAUDAL; PERINEURAL
Status: COMPLETED | OUTPATIENT
Start: 2024-05-15 | End: 2024-05-15

## 2024-05-15 RX ORDER — TRIAMCINOLONE ACETONIDE 40 MG/ML
80 INJECTION, SUSPENSION INTRA-ARTICULAR; INTRAMUSCULAR
Status: COMPLETED | OUTPATIENT
Start: 2024-05-15 | End: 2024-05-15

## 2024-05-15 RX ADMIN — LIDOCAINE HYDROCHLORIDE 4 ML: 10 INJECTION, SOLUTION EPIDURAL; INFILTRATION; INTRACAUDAL; PERINEURAL at 13:59

## 2024-05-15 RX ADMIN — TRIAMCINOLONE ACETONIDE 80 MG: 40 INJECTION, SUSPENSION INTRA-ARTICULAR; INTRAMUSCULAR at 13:59

## 2024-05-15 NOTE — PROGRESS NOTES
Subjective: Rheumatoid arthritis both knees     Patient ID: Fanny Winchester is a 58 y.o. female.    Chief Complaint:    History of Present Illness 58-year-old female presents today to undergo repeat cortisone injection into both knees.  The last injection given in January lasted for about 2 months and is requesting repeat injections today.  Again insurance does not cover gel injections.       Social History     Occupational History    Not on file   Tobacco Use    Smoking status: Never    Smokeless tobacco: Never   Vaping Use    Vaping status: Never Used   Substance and Sexual Activity    Alcohol use: Yes     Comment: Occasuional    Drug use: Never    Sexual activity: Not Currently      Review of Systems      Past Medical History:   Diagnosis Date    Anxiety     Mason tumor 11/2020    DDD (degenerative disc disease), lumbar 03/28/2022    Fibromyalgia     Osteoarthritis     Rheumatoid arthritis involving multiple sites      Past Surgical History:   Procedure Laterality Date    COLONOSCOPY N/A 12/22/2020    Procedure: COLONOSCOPY with polypectomy;  Surgeon: Adarsh Arredondo MD;  Location: [facility];  Service: Gastroenterology;  Laterality: N/A;  Rectal polyp    ENDOSCOPY N/A 12/22/2020    Procedure: ESOPHAGOGASTRODUODENOSCOPY with biopsies;  Surgeon: Adarsh Arredondo MD;  Location: [facility];  Service: Gastroenterology;  Laterality: N/A;  Reflux esophagitis  Gastritis  Duodenitis  Hiatal hernia  Biopsies: distal esophagus, gastric, duodenum    GANGLION CYST EXCISION Left 2019    HYSTERECTOMY  2000    OOPHORECTOMY Right 11/2020    TUMOR REMOVAL      ULNAR NERVE TRANSPOSITION Left 2019     Family History   Problem Relation Age of Onset    Heart disease Father     Hypertension Father     Hyperlipidemia Father     Heart disease Mother     Lupus Mother     Colon cancer Maternal Aunt     Colon polyps Maternal Aunt     Breast cancer Neg Hx     Ovarian cancer Neg Hx          Objective:  There were no  vitals filed for this visit.      05/15/24  1357   Weight: 85.7 kg (189 lb)     Body mass index is 34.56 kg/m².        Ortho Exam   she is alert and oriented x 3.  Neither knee shows any swelling effusion erythema in the cool to touch.  The therefore injected through the superolateral portal after sterile prepping with 4 cc of lidocaine and 2 cc of Kenalog.  Postinjection instructions given to the patient.    Assessment:       Return in 3 months for possible reinjection.  Patient was in agreement.  Answered all questions  1. Rheumatoid arthritis involving both knees with positive rheumatoid factor           Plan:      - Large Joint Arthrocentesis: bilateral knee on 5/15/2024 1:59 PM  Indications: pain  Details: 22 G needle, superolateral approach  Medications (Right): 4 mL lidocaine PF 1% 1 %; 80 mg triamcinolone acetonide 40 MG/ML  Medications (Left): 4 mL lidocaine PF 1% 1 %; 80 mg triamcinolone acetonide 40 MG/ML  Outcome: tolerated well, no immediate complications  Procedure, treatment alternatives, risks and benefits explained, specific risks discussed. Consent was given by the patient. Immediately prior to procedure a time out was called to verify the correct patient, procedure, equipment, support staff and site/side marked as required. Patient was prepped and draped in the usual sterile fashion.             Work Status:    GINA query complete.    Orders:  Orders Placed This Encounter   Procedures    - Large Joint Arthrocentesis: bilateral knee       Medications:  No orders of the defined types were placed in this encounter.      Followup:  Return in about 3 months (around 8/15/2024).          Dictated utilizing Dragon dictation

## 2024-08-21 ENCOUNTER — OFFICE VISIT (OUTPATIENT)
Dept: ORTHOPEDIC SURGERY | Facility: CLINIC | Age: 59
End: 2024-08-21
Payer: COMMERCIAL

## 2024-08-21 VITALS — WEIGHT: 192.4 LBS | HEIGHT: 62 IN | BODY MASS INDEX: 35.41 KG/M2

## 2024-08-21 DIAGNOSIS — M05.762 RHEUMATOID ARTHRITIS INVOLVING BOTH KNEES WITH POSITIVE RHEUMATOID FACTOR: ICD-10-CM

## 2024-08-21 DIAGNOSIS — M05.761 RHEUMATOID ARTHRITIS INVOLVING BOTH KNEES WITH POSITIVE RHEUMATOID FACTOR: ICD-10-CM

## 2024-08-21 DIAGNOSIS — M25.561 PAIN IN BOTH KNEES, UNSPECIFIED CHRONICITY: Primary | ICD-10-CM

## 2024-08-21 DIAGNOSIS — M25.562 PAIN IN BOTH KNEES, UNSPECIFIED CHRONICITY: Primary | ICD-10-CM

## 2024-08-21 PROCEDURE — 20610 DRAIN/INJ JOINT/BURSA W/O US: CPT | Performed by: ORTHOPAEDIC SURGERY

## 2024-08-21 PROCEDURE — 73562 X-RAY EXAM OF KNEE 3: CPT | Performed by: ORTHOPAEDIC SURGERY

## 2024-08-21 RX ORDER — LIDOCAINE HYDROCHLORIDE 10 MG/ML
4 INJECTION, SOLUTION EPIDURAL; INFILTRATION; INTRACAUDAL; PERINEURAL
Status: COMPLETED | OUTPATIENT
Start: 2024-08-21 | End: 2024-08-21

## 2024-08-21 RX ORDER — TRIAMCINOLONE ACETONIDE 40 MG/ML
80 INJECTION, SUSPENSION INTRA-ARTICULAR; INTRAMUSCULAR
Status: COMPLETED | OUTPATIENT
Start: 2024-08-21 | End: 2024-08-21

## 2024-08-21 RX ADMIN — TRIAMCINOLONE ACETONIDE 80 MG: 40 INJECTION, SUSPENSION INTRA-ARTICULAR; INTRAMUSCULAR at 14:44

## 2024-08-21 RX ADMIN — LIDOCAINE HYDROCHLORIDE 4 ML: 10 INJECTION, SOLUTION EPIDURAL; INFILTRATION; INTRACAUDAL; PERINEURAL at 14:44

## 2024-08-21 NOTE — PROGRESS NOTES
Subjective: Rheumatoid arthritis both knees     Patient ID: Fanny Winchester is a 59 y.o. female.    Chief Complaint:    History of Present Illness 59-year-old female returns with the knees once again symptomatic.  The cortisone injection lasted maybe 6 to 7 weeks.  She is not currently taking any anti-inflammatory medication.  She is taking nothing for rheumatoid arthritis as her rheumatologist does not take her current insurance and it nor does it pay for gel injections.  She is having disabling pain.  At this point she cannot undergo surgery as she has no one to help take care of her she lives alone.  And she cannot take time off of work either.       Social History     Occupational History    Not on file   Tobacco Use    Smoking status: Never    Smokeless tobacco: Never   Vaping Use    Vaping status: Never Used   Substance and Sexual Activity    Alcohol use: Yes     Comment: Occasuional    Drug use: Never    Sexual activity: Not Currently      Review of Systems      Past Medical History:   Diagnosis Date    Anxiety     Mason tumor 11/2020    DDD (degenerative disc disease), lumbar 03/28/2022    Fibromyalgia     Osteoarthritis     Rheumatoid arthritis involving multiple sites      Past Surgical History:   Procedure Laterality Date    COLONOSCOPY N/A 12/22/2020    Procedure: COLONOSCOPY with polypectomy;  Surgeon: Adarsh Arredondo MD;  Location: [facility] OR;  Service: Gastroenterology;  Laterality: N/A;  Rectal polyp    ENDOSCOPY N/A 12/22/2020    Procedure: ESOPHAGOGASTRODUODENOSCOPY with biopsies;  Surgeon: Adarsh Arredondo MD;  Location: [facility] OR;  Service: Gastroenterology;  Laterality: N/A;  Reflux esophagitis  Gastritis  Duodenitis  Hiatal hernia  Biopsies: distal esophagus, gastric, duodenum    GANGLION CYST EXCISION Left 2019    HYSTERECTOMY  2000    OOPHORECTOMY Right 11/2020    TUMOR REMOVAL      ULNAR NERVE TRANSPOSITION Left 2019     Family History   Problem Relation Age of Onset     Heart disease Father     Hypertension Father     Hyperlipidemia Father     Heart disease Mother     Lupus Mother     Colon cancer Maternal Aunt     Colon polyps Maternal Aunt     Breast cancer Neg Hx     Ovarian cancer Neg Hx          Objective:  There were no vitals filed for this visit.      08/21/24  1419   Weight: 87.3 kg (192 lb 6.4 oz)     Body mass index is 35.18 kg/m².  BMI is >= 30 and <35. (Class 1 Obesity). The following options were offered after discussion;:          Ortho Exam   AP lateral sunrise view of the knee does show tricompartmental changes and there is some progression of the arthritis in the right knee although the left is most symptomatic.  She is alert and oriented x 3.  Neither knee shows any swelling effusion erythema.  She underwent injections therefore through the superolateral portal 4 cc of lidocaine and 2 cc of Kenalog.  Postinjection instructions given.    Assessment:        1. Pain in both knees, unspecified chronicity    2. Rheumatoid arthritis involving both knees with positive rheumatoid factor           Plan: I did advise the patient on taking 2 Aleve twice a day to help reduce her discomfort.  Return to see me on an as-needed basis.  I told her she can return in 3 months for repeat injections if needed.  She states she may try to contact or obtain another insurance that might pay for the gel injections.  I did tell her to be sure that the insurance she chooses does pay for the gel injections.  - Large Joint Arthrocentesis: bilateral knee on 8/21/2024 2:44 PM  Indications: pain  Details: 22 G needle, superolateral approach  Medications (Right): 80 mg triamcinolone acetonide 40 MG/ML; 4 mL lidocaine PF 1% 1 %  Medications (Left): 80 mg triamcinolone acetonide 40 MG/ML; 4 mL lidocaine PF 1% 1 %  Outcome: tolerated well, no immediate complications  Procedure, treatment alternatives, risks and benefits explained, specific risks discussed. Consent was given by the patient.  Immediately prior to procedure a time out was called to verify the correct patient, procedure, equipment, support staff and site/side marked as required. Patient was prepped and draped in the usual sterile fashion.                 Work Status:    GINA query complete.    Orders:  Orders Placed This Encounter   Procedures    - Large Joint Arthrocentesis: bilateral knee    XR Knee 3 View Bilateral       Medications:  No orders of the defined types were placed in this encounter.      Followup:  Return in about 3 months (around 11/21/2024).          Dictated utilizing Dragon dictation

## 2024-11-12 ENCOUNTER — TELEPHONE (OUTPATIENT)
Dept: ORTHOPEDIC SURGERY | Facility: CLINIC | Age: 59
End: 2024-11-12
Payer: COMMERCIAL

## 2024-11-12 NOTE — TELEPHONE ENCOUNTER
RECEIVED VM REGARDING INSURANCE COVERAGE OF [employer]. RETURNED PATIENT'S CALL TO DISCUSS. LEFT VM WITH CALL BACK NUMBER AND MY NAME.

## 2024-11-20 ENCOUNTER — OFFICE VISIT (OUTPATIENT)
Dept: ORTHOPEDIC SURGERY | Facility: CLINIC | Age: 59
End: 2024-11-20
Payer: COMMERCIAL

## 2024-11-20 VITALS — BODY MASS INDEX: 35.33 KG/M2 | WEIGHT: 192 LBS | HEIGHT: 62 IN

## 2024-11-20 DIAGNOSIS — M05.761 RHEUMATOID ARTHRITIS INVOLVING BOTH KNEES WITH POSITIVE RHEUMATOID FACTOR: Primary | ICD-10-CM

## 2024-11-20 DIAGNOSIS — M25.561 PAIN IN BOTH KNEES, UNSPECIFIED CHRONICITY: ICD-10-CM

## 2024-11-20 DIAGNOSIS — M25.562 PAIN IN BOTH KNEES, UNSPECIFIED CHRONICITY: ICD-10-CM

## 2024-11-20 DIAGNOSIS — M05.762 RHEUMATOID ARTHRITIS INVOLVING BOTH KNEES WITH POSITIVE RHEUMATOID FACTOR: Primary | ICD-10-CM

## 2024-11-20 RX ORDER — TRIAMCINOLONE ACETONIDE 40 MG/ML
80 INJECTION, SUSPENSION INTRA-ARTICULAR; INTRAMUSCULAR
Status: COMPLETED | OUTPATIENT
Start: 2024-11-20 | End: 2024-11-20

## 2024-11-20 RX ORDER — LIDOCAINE HYDROCHLORIDE 10 MG/ML
4 INJECTION, SOLUTION EPIDURAL; INFILTRATION; INTRACAUDAL; PERINEURAL
Status: COMPLETED | OUTPATIENT
Start: 2024-11-20 | End: 2024-11-20

## 2024-11-20 RX ADMIN — TRIAMCINOLONE ACETONIDE 80 MG: 40 INJECTION, SUSPENSION INTRA-ARTICULAR; INTRAMUSCULAR at 14:10

## 2024-11-20 RX ADMIN — LIDOCAINE HYDROCHLORIDE 4 ML: 10 INJECTION, SOLUTION EPIDURAL; INFILTRATION; INTRACAUDAL; PERINEURAL at 14:10

## 2024-11-20 NOTE — PROGRESS NOTES
Subjective: Rheumatoid arthritis both knees     Patient ID: Fanny Winchester is a 59 y.o. female.    Chief Complaint:    History of Present Illness 59-year-old female is seen for cortisone injection into both knees       Social History     Occupational History    Not on file   Tobacco Use    Smoking status: Never    Smokeless tobacco: Never   Vaping Use    Vaping status: Never Used   Substance and Sexual Activity    Alcohol use: Not Currently     Comment: Occasuional    Drug use: Never    Sexual activity: Not Currently      Review of Systems      Past Medical History:   Diagnosis Date    Anxiety     Mason tumor 11/2020    DDD (degenerative disc disease), lumbar 03/28/2022    Fibromyalgia     Osteoarthritis     Rheumatoid arthritis involving multiple sites      Past Surgical History:   Procedure Laterality Date    COLONOSCOPY N/A 12/22/2020    Procedure: COLONOSCOPY with polypectomy;  Surgeon: Adarsh Arredondo MD;  Location: [facility];  Service: Gastroenterology;  Laterality: N/A;  Rectal polyp    ENDOSCOPY N/A 12/22/2020    Procedure: ESOPHAGOGASTRODUODENOSCOPY with biopsies;  Surgeon: Adarsh Arredondo MD;  Location: [facility] OR;  Service: Gastroenterology;  Laterality: N/A;  Reflux esophagitis  Gastritis  Duodenitis  Hiatal hernia  Biopsies: distal esophagus, gastric, duodenum    GANGLION CYST EXCISION Left 2019    HYSTERECTOMY  2000    OOPHORECTOMY Right 11/2020    TUMOR REMOVAL      ULNAR NERVE TRANSPOSITION Left 2019     Family History   Problem Relation Age of Onset    Heart disease Father     Hypertension Father     Hyperlipidemia Father     Heart disease Mother     Lupus Mother     Colon cancer Maternal Aunt     Colon polyps Maternal Aunt     Breast cancer Neg Hx     Ovarian cancer Neg Hx          Objective:  There were no vitals filed for this visit.      11/20/24  1408   Weight: 87.1 kg (192 lb)     Body mass index is 35.12 kg/m².           Ortho Exam   alert and oriented x 3.  The knee  shows any swelling effusion erythema.  They are therefore injected through the superolateral portal of 4 cc of lidocaine and 2 cc of Kenalog.  Postinjection instructions given.    Assessment:        1. Rheumatoid arthritis involving both knees with positive rheumatoid factor    2. Pain in both knees, unspecified chronicity           Plan: Return as needed.  Answered all questions  - Large Joint Arthrocentesis: bilateral knee on 11/20/2024 2:10 PM  Indications: pain  Details: 22 G needle, superolateral approach  Medications (Right): 4 mL lidocaine PF 1% 1 %; 80 mg triamcinolone acetonide 40 MG/ML  Medications (Left): 4 mL lidocaine PF 1% 1 %; 80 mg triamcinolone acetonide 40 MG/ML  Outcome: tolerated well, no immediate complications  Procedure, treatment alternatives, risks and benefits explained, specific risks discussed. Consent was given by the patient. Immediately prior to procedure a time out was called to verify the correct patient, procedure, equipment, support staff and site/side marked as required. Patient was prepped and draped in the usual sterile fashion.                 Work Status:    GINA query complete.    Orders:  Orders Placed This Encounter   Procedures    - Large Joint Arthrocentesis: bilateral knee       Medications:  No orders of the defined types were placed in this encounter.      Followup:  Return if symptoms worsen or fail to improve.          Dictated utilizing Dragon dictation

## 2024-12-11 ENCOUNTER — OFFICE VISIT (OUTPATIENT)
Dept: ORTHOPEDIC SURGERY | Facility: CLINIC | Age: 59
End: 2024-12-11
Payer: COMMERCIAL

## 2024-12-11 VITALS — WEIGHT: 192 LBS | BODY MASS INDEX: 35.33 KG/M2 | HEIGHT: 62 IN

## 2024-12-11 DIAGNOSIS — M25.562 LEFT KNEE PAIN, UNSPECIFIED CHRONICITY: Primary | ICD-10-CM

## 2024-12-11 DIAGNOSIS — M05.762 RHEUMATOID ARTHRITIS INVOLVING BOTH KNEES WITH POSITIVE RHEUMATOID FACTOR: ICD-10-CM

## 2024-12-11 DIAGNOSIS — M05.761 RHEUMATOID ARTHRITIS INVOLVING BOTH KNEES WITH POSITIVE RHEUMATOID FACTOR: ICD-10-CM

## 2024-12-11 PROCEDURE — 99213 OFFICE O/P EST LOW 20 MIN: CPT | Performed by: ORTHOPAEDIC SURGERY

## 2024-12-11 RX ORDER — MELOXICAM 15 MG/1
15 TABLET ORAL DAILY
Qty: 30 TABLET | Refills: 3 | Status: SHIPPED | OUTPATIENT
Start: 2024-12-11

## 2024-12-11 NOTE — PROGRESS NOTES
Subjective: Left knee pain     Patient ID: Fanny Winchester is a 59 y.o. female.    Chief Complaint:    History of Present Illness 59-year-old female with rheumatoid arthritis and degenerative changes of the left knee returns with significant pain and discomfort in the knee.  The last cortisone injection offered very little of any relief.  Her insurance again does not cover gel injections.  She does have rheumatoid arthritis but is not currently taking any anti-inflammatories on a regular basis.  She is tried occasional ibuprofen with no relief of her symptoms.       Social History     Occupational History    Not on file   Tobacco Use    Smoking status: Never    Smokeless tobacco: Never   Vaping Use    Vaping status: Never Used   Substance and Sexual Activity    Alcohol use: Not Currently     Comment: Occasuional    Drug use: Never    Sexual activity: Not Currently      Review of Systems      Past Medical History:   Diagnosis Date    Anxiety     Mason tumor 11/2020    DDD (degenerative disc disease), lumbar 03/28/2022    Fibromyalgia     Osteoarthritis     Rheumatoid arthritis involving multiple sites      Past Surgical History:   Procedure Laterality Date    COLONOSCOPY N/A 12/22/2020    Procedure: COLONOSCOPY with polypectomy;  Surgeon: Adarsh Arredondo MD;  Location: [facility] OR;  Service: Gastroenterology;  Laterality: N/A;  Rectal polyp    ENDOSCOPY N/A 12/22/2020    Procedure: ESOPHAGOGASTRODUODENOSCOPY with biopsies;  Surgeon: Adarsh Arredondo MD;  Location: [facility] OR;  Service: Gastroenterology;  Laterality: N/A;  Reflux esophagitis  Gastritis  Duodenitis  Hiatal hernia  Biopsies: distal esophagus, gastric, duodenum    GANGLION CYST EXCISION Left 2019    HYSTERECTOMY  2000    OOPHORECTOMY Right 11/2020    TUMOR REMOVAL      ULNAR NERVE TRANSPOSITION Left 2019     Family History   Problem Relation Age of Onset    Heart disease Father     Hypertension Father     Hyperlipidemia Father      Heart disease Mother     Lupus Mother     Colon cancer Maternal Aunt     Colon polyps Maternal Aunt     Breast cancer Neg Hx     Ovarian cancer Neg Hx          Objective:  There were no vitals filed for this visit.      12/11/24  1358   Weight: 87.1 kg (192 lb)     Body mass index is 35.12 kg/m².           Ortho Exam   AP lateral of the knee does show bone to bone in the medial compartment with progression of the arthritis compared to x-rays done last August.  She is alert and oriented x 3.  There is marked pain and crepitus with range of motion which is 0 to 125 degrees.  Quad hamstring function 5/5.  No instability in flexion extension nor any varus or valgus instability at 10 to 30 degrees but there is marked medial joint line tenderness but negative Ilan's.  Calf is nontender.  Good pulses no motor or sensory deficit.  Tolerates anti-inflammatories but taken subtherapeutic dosage.    Assessment:        1. Left knee pain, unspecified chronicity    2. Rheumatoid arthritis involving both knees with positive rheumatoid factor           Plan: Reviewed with the patient her history x-ray and physical exam.  Her arthritis is progressing as noted by x-ray.  She has failed the cortisone injections and the anti-inflammatories.  I will put her on meloxicam to try to get her some relief.  She states she is going to try to find a new rheumatologist to get under their care with different medications for the rheumatoid arthritis but her pain is quite disabling.  For now I am going refer her to Dr. Meadows for surgical consultation to discuss the risk and benefits length and rehab recovery etc. so the patient can make a more informed decision regarding timing of joint replacement.  Appointment has been made.  Answered all questions            Work Status:    GINA query complete.    Orders:  Orders Placed This Encounter   Procedures    XR Knee 3+ View With [place] Left       Medications:  New Medications Ordered This Visit    Medications    meloxicam (MOBIC) 15 MG tablet     Sig: Take 1 tablet by mouth Daily.     Dispense:  30 tablet     Refill:  3       Followup:  Return in about 2 weeks (around 12/25/2024).          Dictated utilizing Dragon dictation

## 2025-01-10 ENCOUNTER — OFFICE VISIT (OUTPATIENT)
Dept: ORTHOPEDIC SURGERY | Facility: CLINIC | Age: 60
End: 2025-01-10
Payer: COMMERCIAL

## 2025-01-10 VITALS
WEIGHT: 189 LBS | BODY MASS INDEX: 34.78 KG/M2 | DIASTOLIC BLOOD PRESSURE: 76 MMHG | SYSTOLIC BLOOD PRESSURE: 124 MMHG | HEIGHT: 62 IN | HEART RATE: 68 BPM

## 2025-01-10 DIAGNOSIS — M17.12 PRIMARY OSTEOARTHRITIS OF LEFT KNEE: Primary | ICD-10-CM

## 2025-01-10 PROBLEM — M17.9 OA (OSTEOARTHRITIS) OF KNEE: Status: ACTIVE | Noted: 2025-01-10

## 2025-01-10 RX ORDER — CHLORHEXIDINE GLUCONATE 500 MG/1
CLOTH TOPICAL ONCE
OUTPATIENT
Start: 2025-01-10

## 2025-01-10 RX ORDER — PREGABALIN 150 MG/1
150 CAPSULE ORAL ONCE
OUTPATIENT
Start: 2025-01-10 | End: 2025-01-10

## 2025-01-10 NOTE — PROGRESS NOTES
Subjective:     Patient ID: Fanny Winchester is a 59 y.o. female.    Chief Complaint:    History of Present Illness  Fanny Winchester returns to clinic today for evaluation of left knee pain.  The patient states she has tried countless conservative treatments consisting of activity modification and NSAIDs low-impact exercise observation and multiple intra-articular injections.  She states the pain is located anterior medially in the knee and is worse with activity and better with rest.  The patient is hopeful to be considered for total knee arthroplasty but is hoping to wait towards summertime when she will be laid off from the  that she works at.     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    DDD (degenerative disc disease), lumbar 03/28/2022    Fibromyalgia     Osteoarthritis     Rheumatoid arthritis involving multiple sites      Past Surgical History:   Procedure Laterality Date    COLONOSCOPY N/A 12/22/2020    Procedure: COLONOSCOPY with polypectomy;  Surgeon: Adarsh Arredondo MD;  Location: [facility] OR;  Service: Gastroenterology;  Laterality: N/A;  Rectal polyp    ENDOSCOPY N/A 12/22/2020    Procedure: ESOPHAGOGASTRODUODENOSCOPY with biopsies;  Surgeon: Adarsh Arredondo MD;  Location: [facility];  Service: Gastroenterology;  Laterality: N/A;  Reflux esophagitis  Gastritis  Duodenitis  Hiatal hernia  Biopsies: distal esophagus, gastric, duodenum    GANGLION CYST EXCISION Left 2019    HYSTERECTOMY  2000    OOPHORECTOMY Right 11/2020    TUMOR REMOVAL      ULNAR NERVE TRANSPOSITION Left 2019       Family History   Problem Relation Age of Onset    Heart  "disease Father     Hypertension Father     Hyperlipidemia Father     Heart disease Mother     Lupus Mother     Colon cancer Maternal Aunt     Colon polyps Maternal Aunt     Breast cancer Neg Hx     Ovarian cancer Neg Hx                  Objective:  Vitals:    01/10/25 1249   BP: 124/76   Pulse: 68   Weight: 85.7 kg (189 lb)   Height: 157.5 cm (62\")         01/10/25  1249   Weight: 85.7 kg (189 lb)     Body mass index is 34.57 kg/m².           Left Knee Exam     Tenderness   The patient is experiencing tenderness in the medial retinaculum and medial joint line.    Range of Motion   Extension:  0   Flexion:  120     Tests   Varus: negative Valgus: negative  Lachman:  Anterior - negative    Posterior - negative  Drawer:  Anterior - negative     Posterior - negative    Other   Erythema: absent  Scars: absent  Sensation: normal  Pulse: present  Swelling: mild  Effusion: no effusion present                 Imaging: 3 views of the left knee were reviewed by myself in the office today  Indication: left knee pain  Findings: X-rays demonstrate no acute osseous abnormality.  There is no signs of fracture dislocation or subluxation.  There is severe joint space narrowing, subchondral sclerosis, cystic changes, and periarticular osteophytes most pronounced in the Medial joint.  Comparative studies: None        Assessment:        1. Primary osteoarthritis of left knee           Plan:        She has failed conservative management of left knee osteoarthritis.  We discussed further conservative treatments including but not limited to physical therapy, intra-articular injections, nonsteroidal anti-inflammatories, topical creams, use of an assistive device, weight loss, and low impact exercises.  Shevoiced understanding of further nonoperative treatment options but She is interested in proceeding with knee replacement surgery.    The spectrum of treatment options were discussed with the patient in detail including both the " nonoperative and operative treatment modalities and their respective risks and benefits.  After thorough discussion, the patient has elected to undergo surgical treatment.  The details of the surgical procedure were explained including the location of probable incisions and a description of the likely implants to be used.  Models and diagrams were used as educational resources. The patient understands the likely convalescence after surgery, as well as the rehabilitation required.  We thoroughly discussed the risks, benefits, and alternatives to surgery.  The risks include but are not limited to the risk of infection, joint stiffness, blood clots (including DVT and/or pulmonary embolus along with the risk of death), neurologic and/or vascular injury, fracture, dislocation, nonunion, malunion, need for further surgery including hardware failure requiring revision, and continued pain.  It was explained that if tissue has been repaired or reconstructed, there is also a chance of failure which may require further management.  Following the completion of the discussion, the patient expressed understanding of this planned course of care, all their questions were answered and consent will be obtained preoperatively.  I also reviewed the typical postoperative recovery of 6-12 months before maximal recovery, and possible need for rehabilitation stay after hospitalization. I also explained that in some series of patients in the research literature, up to 20% of patients are dissatisfied with their total knee arthroplasty. I also discussed the risks of of anesthesia. I also explained that she would meet with Anesthesiology preoperatively to discuss anesthetic risk.  All questions were answered.     Plan for left total knee arthroplasty.    Implants: [employer] cemented Legion TKS with [employer]  Anticoagulation: Asprin  Antibiotics: Cefazolin  Admission Type: 23 HR Obs  TXA: Yes        Fanny Winchester was in agreement  with plan and had all questions answered.     Medications:  No orders of the defined types were placed in this encounter.      Followup:  No follow-ups on file.    Diagnoses and all orders for this visit:    1. Primary osteoarthritis of left knee (Primary)  -     Case Request; Standing  -     CBC and Differential; Future  -     Basic metabolic panel; Future  -     MRSA Screen Culture (Outpatient) - Swab, Nares; Future  -     Hemoglobin A1c; Future  -     Type and screen; Future  -     Urinalysis With Culture If Indicated -; Future  -     lactated ringers bolus 500 mL  -     Tranexamic Acid 1,000 mg in sodium chloride 0.9 % 100 mL  -     Tranexamic Acid 1,000 mg in sodium chloride 0.9 % 100 mL  -     Chlorhexidine Gluconate Cloth 2 % pads  -     ethyl alcohol 62 % 2 each  -     ceFAZolin (ANCEF) 2 g in sodium chloride 0.9 % 100 mL IVPB  -     pregabalin (LYRICA) capsule 150 mg  -     Case Request    Other orders  -     Follow Anesthesia Guidelines / Protocol; Future  -     Follow Anesthesia Guidelines / Protocol; Standing  -     Nerve Block; Standing  -     Verify NPO Status; Standing  -     SCD (sequential compression device)- to be placed on patient in Pre-op; Standing  -     Clip operative site; Standing  -     Care Order / Instructions  -     Initiate Observation Status; Standing          Dictated utilizing Dragon dictation

## 2025-03-11 ENCOUNTER — OFFICE VISIT (OUTPATIENT)
Dept: OBSTETRICS AND GYNECOLOGY | Facility: CLINIC | Age: 60
End: 2025-03-11
Payer: COMMERCIAL

## 2025-03-11 VITALS
WEIGHT: 187.2 LBS | DIASTOLIC BLOOD PRESSURE: 74 MMHG | BODY MASS INDEX: 34.45 KG/M2 | SYSTOLIC BLOOD PRESSURE: 118 MMHG | HEIGHT: 62 IN

## 2025-03-11 DIAGNOSIS — Z01.419 ROUTINE GYNECOLOGICAL EXAMINATION: Primary | ICD-10-CM

## 2025-03-11 DIAGNOSIS — Z11.51 SCREENING FOR HUMAN PAPILLOMAVIRUS (HPV): ICD-10-CM

## 2025-03-11 LAB
BILIRUB BLD-MCNC: NEGATIVE MG/DL
CLARITY, POC: CLEAR
COLOR UR: YELLOW
GLUCOSE UR STRIP-MCNC: NEGATIVE MG/DL
KETONES UR QL: NEGATIVE
LEUKOCYTE EST, POC: NEGATIVE
NITRITE UR-MCNC: NEGATIVE MG/ML
PH UR: 5 [PH] (ref 5–8)
PROT UR STRIP-MCNC: NEGATIVE MG/DL
RBC # UR STRIP: NEGATIVE /UL
SP GR UR: 1 (ref 1–1.03)
UROBILINOGEN UR QL: NORMAL

## 2025-03-11 NOTE — PROGRESS NOTES
GYN Annual Exam     CC- Here for annual exam.     Fanny Winchester is a 59 y.o. female who presents for annual well woman exam. Periods are absent.     OB History          2    Para   2    Term   2            AB        Living   2         SAB        IAB        Ectopic        Molar        Multiple        Live Births                    Current contraception: status post hysterectomy  History of abnormal Pap smear: yes - LG cuff, HPV -  Family history of uterine, colon or ovarian cancer: no  History of abnormal mammogram: no  Family history of breast cancer: no  Last Pap :  LG pap of cuff, neg HPV    Past Medical History:   Diagnosis Date    Anxiety     Mason tumor 2020    DDD (degenerative disc disease), lumbar 2022    Fibromyalgia     Osteoarthritis     Rheumatoid arthritis involving multiple sites        Past Surgical History:   Procedure Laterality Date    COLONOSCOPY N/A 2020    Procedure: COLONOSCOPY with polypectomy;  Surgeon: Adarsh Arredondo MD;  Location: [facility];  Service: Gastroenterology;  Laterality: N/A;  Rectal polyp    ENDOSCOPY N/A 2020    Procedure: ESOPHAGOGASTRODUODENOSCOPY with biopsies;  Surgeon: Adarsh Arredondo MD;  Location: [facility] OR;  Service: Gastroenterology;  Laterality: N/A;  Reflux esophagitis  Gastritis  Duodenitis  Hiatal hernia  Biopsies: distal esophagus, gastric, duodenum    GANGLION CYST EXCISION Left 2019    HYSTERECTOMY  2000    OOPHORECTOMY Right 2020    TUMOR REMOVAL      ULNAR NERVE TRANSPOSITION Left 2019         Current Outpatient Medications:     meloxicam (MOBIC) 15 MG tablet, Take 1 tablet by mouth Daily., Disp: 30 tablet, Rfl: 3    hydroxychloroquine (PLAQUENIL) 200 MG tablet, , Disp: , Rfl:     No Known Allergies    Social History     Tobacco Use    Smoking status: Never    Smokeless tobacco: Never   Vaping Use    Vaping status: Never Used   Substance Use Topics    Alcohol use: Not Currently     Comment:  "Occasuional    Drug use: Never         Family History   Problem Relation Age of Onset    Heart disease Father     Hypertension Father     Hyperlipidemia Father     Heart disease Mother     Lupus Mother     Colon cancer Maternal Aunt     Colon polyps Maternal Aunt     Breast cancer Neg Hx     Ovarian cancer Neg Hx        Review of Systems   Constitutional:  Negative for appetite change, fever and unexpected weight change.   HENT:  Negative for congestion and sore throat.    Respiratory:  Negative for cough and shortness of breath.    Cardiovascular:  Negative for chest pain and palpitations.   Gastrointestinal:  Negative for abdominal distention, abdominal pain, constipation, diarrhea, nausea and vomiting.   Endocrine: Negative.    Genitourinary:  Negative for dyspareunia, menstrual problem, pelvic pain and vaginal discharge.   Skin: Negative.    Neurological:  Negative for dizziness and syncope.   Hematological: Negative.    Psychiatric/Behavioral:  Negative for dysphoric mood and sleep disturbance. The patient is not nervous/anxious.      /74   Ht 157.5 cm (62.01\")   Wt 84.9 kg (187 lb 3.2 oz)   BMI 34.23 kg/m²     Physical Exam  Vitals and nursing note reviewed. Exam conducted with a chaperone present.   Constitutional:       Appearance: She is well-developed.   HENT:      Head: Normocephalic and atraumatic.   Neck:      Thyroid: No thyromegaly.   Cardiovascular:      Rate and Rhythm: Normal rate and regular rhythm.   Pulmonary:      Effort: Pulmonary effort is normal.      Breath sounds: Normal breath sounds.   Chest:   Breasts:     Breasts are symmetrical.      Right: No mass or nipple discharge.      Left: No mass or nipple discharge.   Abdominal:      General: Bowel sounds are normal. There is no distension.      Palpations: Abdomen is soft. There is no mass.      Tenderness: There is no abdominal tenderness. There is no guarding or rebound.   Genitourinary:     General: Normal vulva.      Exam " position: Supine.      Labia:         Right: No lesion.         Left: No lesion.       Vagina: Normal.      Cervix: No cervical motion tenderness or discharge.      Uterus: Absent.       Adnexa:         Right: No mass.          Left: No mass.     Musculoskeletal:         General: Normal range of motion.      Cervical back: Normal range of motion and neck supple.   Skin:     General: Skin is warm and dry.   Neurological:      Mental Status: She is alert and oriented to person, place, and time.   Psychiatric:         Behavior: Behavior normal.         Thought Content: Thought content normal.         Judgment: Judgment normal.     Diagnoses and all orders for this visit:    1. Routine gynecological examination (Primary)  -     POC Urinalysis Dipstick        Assessment     1) GYN annual well woman exam.   2) MMG ordered  3) history of low-grade Pap of the cuff with negative HPV.  I repeated a Pap of the cuff today.  Colpo has been negative.  She is a non-smoker.     Plan     1) Breast Health - Clinical breast exam & mammogram yearly, Self breast awareness monthly  2) Pap - done today  3) Smoking status - Fanny Winchester  reports that she has never smoked. She has never used smokeless tobacco.   4) Follow up prn and one year    Encounter Diagnoses   Name Primary?    Routine gynecological examination Yes         Ed Espinosa MD  3/11/2025  15:51 EDT

## 2025-03-17 LAB
CYTOLOGIST CVX/VAG CYTO: NORMAL
CYTOLOGY CVX/VAG DOC CYTO: NORMAL
CYTOLOGY CVX/VAG DOC THIN PREP: NORMAL
DX ICD CODE: NORMAL
HPV I/H RISK 4 DNA CVX QL PROBE+SIG AMP: NEGATIVE
OTHER STN SPEC: NORMAL
PATHOLOGIST CVX/VAG CYTO: NORMAL
SERVICE CMNT-IMP: NORMAL
STAT OF ADQ CVX/VAG CYTO-IMP: NORMAL

## 2025-04-03 ENCOUNTER — TELEPHONE (OUTPATIENT)
Dept: ORTHOPEDIC SURGERY | Facility: CLINIC | Age: 60
End: 2025-04-03
Payer: COMMERCIAL

## 2025-04-03 DIAGNOSIS — M05.762 RHEUMATOID ARTHRITIS INVOLVING BOTH KNEES WITH POSITIVE RHEUMATOID FACTOR: ICD-10-CM

## 2025-04-03 DIAGNOSIS — M25.561 PAIN IN BOTH KNEES, UNSPECIFIED CHRONICITY: Primary | ICD-10-CM

## 2025-04-03 DIAGNOSIS — M17.12 PRIMARY OSTEOARTHRITIS OF LEFT KNEE: ICD-10-CM

## 2025-04-03 DIAGNOSIS — M05.761 RHEUMATOID ARTHRITIS INVOLVING BOTH KNEES WITH POSITIVE RHEUMATOID FACTOR: ICD-10-CM

## 2025-04-03 DIAGNOSIS — M25.562 PAIN IN BOTH KNEES, UNSPECIFIED CHRONICITY: Primary | ICD-10-CM

## 2025-04-03 RX ORDER — HYDROCODONE BITARTRATE AND ACETAMINOPHEN 5; 325 MG/1; MG/1
1 TABLET ORAL EVERY 8 HOURS PRN
Qty: 24 TABLET | Refills: 0 | Status: SHIPPED | OUTPATIENT
Start: 2025-04-03

## 2025-04-03 NOTE — TELEPHONE ENCOUNTER
Patient wanting something for her knee pain and to help her sleep. The meloxicam is not helping. She has SX 05-23-25

## 2025-04-14 RX ORDER — MELOXICAM 15 MG/1
15 TABLET ORAL DAILY
Qty: 30 TABLET | Refills: 3 | Status: SHIPPED | OUTPATIENT
Start: 2025-04-14

## 2025-04-22 DIAGNOSIS — M05.762 RHEUMATOID ARTHRITIS INVOLVING BOTH KNEES WITH POSITIVE RHEUMATOID FACTOR: ICD-10-CM

## 2025-04-22 DIAGNOSIS — M17.12 PRIMARY OSTEOARTHRITIS OF LEFT KNEE: ICD-10-CM

## 2025-04-22 DIAGNOSIS — M25.562 PAIN IN BOTH KNEES, UNSPECIFIED CHRONICITY: ICD-10-CM

## 2025-04-22 DIAGNOSIS — M05.761 RHEUMATOID ARTHRITIS INVOLVING BOTH KNEES WITH POSITIVE RHEUMATOID FACTOR: ICD-10-CM

## 2025-04-22 DIAGNOSIS — M25.561 PAIN IN BOTH KNEES, UNSPECIFIED CHRONICITY: ICD-10-CM

## 2025-04-22 RX ORDER — HYDROCODONE BITARTRATE AND ACETAMINOPHEN 5; 325 MG/1; MG/1
1 TABLET ORAL EVERY 8 HOURS PRN
Qty: 24 TABLET | Refills: 0 | Status: SHIPPED | OUTPATIENT
Start: 2025-04-22

## 2025-05-06 ENCOUNTER — HOSPITAL ENCOUNTER (OUTPATIENT)
Dept: PHYSICAL THERAPY | Facility: HOSPITAL | Age: 60
Setting detail: THERAPIES SERIES
Discharge: HOME | End: 2025-05-06
Payer: COMMERCIAL

## 2025-05-06 ENCOUNTER — PRE-ADMISSION TESTING (OUTPATIENT)
Dept: PREADMISSION TESTING | Facility: HOSPITAL | Age: 60
End: 2025-05-06
Payer: COMMERCIAL

## 2025-05-06 VITALS
HEIGHT: 63 IN | RESPIRATION RATE: 16 BRPM | DIASTOLIC BLOOD PRESSURE: 80 MMHG | BODY MASS INDEX: 32.43 KG/M2 | HEART RATE: 61 BPM | SYSTOLIC BLOOD PRESSURE: 132 MMHG | WEIGHT: 183 LBS | OXYGEN SATURATION: 98 %

## 2025-05-06 DIAGNOSIS — M17.12 PRIMARY OSTEOARTHRITIS OF LEFT KNEE: Primary | ICD-10-CM

## 2025-05-06 DIAGNOSIS — M17.12 PRIMARY OSTEOARTHRITIS OF LEFT KNEE: ICD-10-CM

## 2025-05-06 LAB
ABO GROUP BLD: NORMAL
ANION GAP SERPL CALCULATED.3IONS-SCNC: 11.6 MMOL/L (ref 5–15)
BASOPHILS # BLD AUTO: 0.03 10*3/MM3 (ref 0–0.2)
BASOPHILS NFR BLD AUTO: 0.6 % (ref 0–1.5)
BILIRUB UR QL STRIP: NEGATIVE
BLD GP AB SCN SERPL QL: NEGATIVE
BUN SERPL-MCNC: 11 MG/DL (ref 8–23)
BUN/CREAT SERPL: 15.1 (ref 7–25)
CALCIUM SPEC-SCNC: 9.6 MG/DL (ref 8.6–10.5)
CHLORIDE SERPL-SCNC: 102 MMOL/L (ref 98–107)
CLARITY UR: CLEAR
CO2 SERPL-SCNC: 23.4 MMOL/L (ref 22–29)
COLOR UR: YELLOW
CREAT SERPL-MCNC: 0.73 MG/DL (ref 0.57–1)
DEPRECATED RDW RBC AUTO: 35.6 FL (ref 37–54)
EGFRCR SERPLBLD CKD-EPI 2021: 93.7 ML/MIN/1.73
EOSINOPHIL # BLD AUTO: 0.06 10*3/MM3 (ref 0–0.4)
EOSINOPHIL NFR BLD AUTO: 1.2 % (ref 0.3–6.2)
ERYTHROCYTE [DISTWIDTH] IN BLOOD BY AUTOMATED COUNT: 11.8 % (ref 12.3–15.4)
GLUCOSE SERPL-MCNC: 101 MG/DL (ref 65–99)
GLUCOSE UR STRIP-MCNC: NEGATIVE MG/DL
HBA1C MFR BLD: 6.39 % (ref 4.8–5.6)
HCT VFR BLD AUTO: 42.6 % (ref 34–46.6)
HGB BLD-MCNC: 13.7 G/DL (ref 12–15.9)
HGB UR QL STRIP.AUTO: NEGATIVE
IMM GRANULOCYTES # BLD AUTO: 0 10*3/MM3 (ref 0–0.05)
IMM GRANULOCYTES NFR BLD AUTO: 0 % (ref 0–0.5)
KETONES UR QL STRIP: NEGATIVE
LEUKOCYTE ESTERASE UR QL STRIP.AUTO: NEGATIVE
LYMPHOCYTES # BLD AUTO: 2.07 10*3/MM3 (ref 0.7–3.1)
LYMPHOCYTES NFR BLD AUTO: 40.9 % (ref 19.6–45.3)
MCH RBC QN AUTO: 26.7 PG (ref 26.6–33)
MCHC RBC AUTO-ENTMCNC: 32.2 G/DL (ref 31.5–35.7)
MCV RBC AUTO: 82.9 FL (ref 79–97)
MONOCYTES # BLD AUTO: 0.5 10*3/MM3 (ref 0.1–0.9)
MONOCYTES NFR BLD AUTO: 9.9 % (ref 5–12)
NEUTROPHILS NFR BLD AUTO: 2.4 10*3/MM3 (ref 1.7–7)
NEUTROPHILS NFR BLD AUTO: 47.4 % (ref 42.7–76)
NITRITE UR QL STRIP: NEGATIVE
PH UR STRIP.AUTO: 5.5 [PH] (ref 4.5–8)
PLATELET # BLD AUTO: 343 10*3/MM3 (ref 140–450)
PMV BLD AUTO: 10.2 FL (ref 6–12)
POTASSIUM SERPL-SCNC: 4.3 MMOL/L (ref 3.5–5.2)
PROT UR QL STRIP: NEGATIVE
RBC # BLD AUTO: 5.14 10*6/MM3 (ref 3.77–5.28)
RH BLD: POSITIVE
SODIUM SERPL-SCNC: 137 MMOL/L (ref 136–145)
SP GR UR STRIP: 1.02 (ref 1–1.03)
T&S EXPIRATION DATE: NORMAL
UROBILINOGEN UR QL STRIP: NORMAL
WBC NRBC COR # BLD AUTO: 5.06 10*3/MM3 (ref 3.4–10.8)

## 2025-05-06 PROCEDURE — 87081 CULTURE SCREEN ONLY: CPT

## 2025-05-06 PROCEDURE — 86850 RBC ANTIBODY SCREEN: CPT

## 2025-05-06 PROCEDURE — 85025 COMPLETE CBC W/AUTO DIFF WBC: CPT

## 2025-05-06 PROCEDURE — 81003 URINALYSIS AUTO W/O SCOPE: CPT

## 2025-05-06 PROCEDURE — 36415 COLL VENOUS BLD VENIPUNCTURE: CPT

## 2025-05-06 PROCEDURE — 83036 HEMOGLOBIN GLYCOSYLATED A1C: CPT

## 2025-05-06 PROCEDURE — 80048 BASIC METABOLIC PNL TOTAL CA: CPT

## 2025-05-06 PROCEDURE — 86900 BLOOD TYPING SEROLOGIC ABO: CPT

## 2025-05-06 PROCEDURE — 86901 BLOOD TYPING SEROLOGIC RH(D): CPT

## 2025-05-06 RX ORDER — HYDROCODONE BITARTRATE AND ACETAMINOPHEN 5; 325 MG/1; MG/1
1 TABLET ORAL EVERY 8 HOURS PRN
Qty: 30 TABLET | Refills: 0 | Status: SHIPPED | OUTPATIENT
Start: 2025-05-06

## 2025-05-06 NOTE — DISCHARGE INSTRUCTIONS
PRE-ADMISSION TESTING INSTRUCTIONS FOR TOTAL JOINT PATIENTS    Take these medications the morning of surgery with a small sip of water:  nothing (may take pain med if needed)      No aspirin, advil, aleve, ibuprofen, naproxen, diet pills, decongestants, or vitamin/herbal supplements for a week prior to your surgery.     Tylenol/Acetaminophen ok to take if needed.    Do not take any insulin or diabetes medications the morning of surgery.          General Instructions:    DO NOT EAT SOLID FOOD AFTER MIDNIGHT THE NIGHT BEFORE SURGERY. No gum, mints, or hard candy after midnight the night before surgery.  You may drink clear liquids the day of surgery up until 2 hours before your arrival time.  Clear liquids are liquids you can see through. Nothing RED in color.    Plain water    Sports drinks      Gelatin (Jell-O)  Fruit juices without pulp such as white grape juice and apple juice  Popsicles that contain no fruit or yogurt  Tea or coffee (no cream or milk added)    It is beneficial for you to have a clear drink that contains carbohydrates 2 hours prior to your arrival time.  DRINK A BOTTLE OF SPORTS DRINK 2 HOURS BEFORE YOUR ARRIVAL TIME. IF YOU ARE DIABETIC, DRINK A LOW OR NO SUGAR SPORTS DRINK. ANY COLOR EXCEPT RED.    Patients who avoid smoking, chewing tobacco and alcohol for 4 weeks prior to surgery have a reduced risk of post-operative complications.  If at all possible, quit smoking as many days before surgery as you can.    Do not smoke, use chewing tobacco or drink alcohol after midnight the day of surgery.    Bring your C-PAP/ BI-PAP machine if you use one.  Wear clean comfortable clothes.  Do not wear contact lenses, lotion, deodorant, or make-up.  Bring a case for your glasses if applicable.   You may brush your teeth the morning of surgery.  You may wear dentures/partials, do not put adhesive/glue on them.  Leave all other jewelry and valuables at home.  NOTIFY YOUR SURGEON IF YOU BECOME ILL, HAVE A  FEVER, PRODUCTIVE COUGH, OR CANNOT BE HERE THE DAY OF SURGERY.      Preventing a Surgical Site Infection:    Shower the night before and on the morning of surgery using the chlorhexidine soap you were given.  Use a clean washcloth with the soap.  Place clean sheets on your bed after showering the night before surgery. Do not use the CHG soap on your hair, face, or private areas. Wash your body gently for five (5) minutes. Do not scrub your skin.  Dry with a clean towel and dress in clean clothing.    Do not shave the surgical area for 10 days-2 weeks prior to surgery  because the razor can irritate skin and make it easier to develop an infection.  Make sure you, your family, and all healthcare providers clean their hands with soap and water or an alcohol based hand  before caring for you or your wound.      Day of surgery:    Your surgeon’s office will advise you of your arrival time for the day of surgery.    Upon arrival, a Pre-op nurse and Anesthesia provider will review your health history, obtain vital signs, and answer questions you may have. The anesthesia provider will also discuss the type of anesthesia that will be needed for your procedure, which may include general anesthesia. The only belongings needed at this time will be your home medications and if applicable your C-PAP/BI-PAP machine.  If you are staying overnight your family can leave the rest of your belongings in the car and bring them to your room later.  A Pre-op nurse will start an IV and you may receive medication in preparation for surgery, including something to help you relax.  Your family will be able to see you in the Pre-op area.  While you are in surgery your family should notify the waiting room  if they leave the waiting room area and provide a contact phone number.    If you have any questions, you can call the Pre-Admission Department at [phone] or your surgeon's office.    Please be aware that surgery  does come with discomfort.  We want to make every effort to control your discomfort so please discuss any uncontrolled symptoms with your nurse.   Your doctor will most likely have prescribed pain medications.     You may have bruising or discomfort from the tourniquet used in surgery.     Please leave all luggage in the car the morning of surgery.  You will be transported to your hospital room following the recovery period.  Your family can get your luggage at that time.      You may receive a survey regarding the care you received. Your feedback is very important and will be used to collect the necessary data to help us to continue to provide excellent care.

## 2025-05-06 NOTE — PAT
Pt here for PAT visit.  Pre-op tests completed, chg soap given, and instructions reviewed.  Instructed clears until 2 hrs prior to arrival time, voiced understanding. ERAS and THALIA dressing reviewed with pt and handouts given

## 2025-05-07 LAB — MRSA SPEC QL CULT: NORMAL

## 2025-05-09 NOTE — CASE MANAGEMENT/SOCIAL WORK
Continued Stay Note  [facility]     Patient Name: Fanny Winchester  MRN: [id number]  Today's Date: 5/9/2025    Admit Date: (Not on file)        Discharge Plan       Row Name 05/09/25 1054       Plan    Plan Comments CM spoke with patient today to arrange any needed equipment and discuss physical therapy for her surgery with Dr. Meadows on 5/21/25. Patient states she will need a rolling walker and BSC at discharge and is agreeable for [facility] in [place] to supply this equipment and referral was made in [facility]. We then discussed physical therapy and her preference was [facility] and she is agreeable for CM to arrange this service. She had no other questions. DAVINA called and spoke with NAYELI in rehab and she states they are out of network with patient's insurance but to try [facility]. DAVINA called and spoke with Hanny at [facility] and she has scheduled an appointment for 5/23/25 @ 9:00am. CM called patient back and she is agreeable to this location, date and time of appointment. CM will follow.                   Discharge Codes    No documentation.                       Lesly Hernandez RN

## 2025-05-12 ENCOUNTER — TELEPHONE (OUTPATIENT)
Dept: ORTHOPEDIC SURGERY | Facility: CLINIC | Age: 60
End: 2025-05-12
Payer: COMMERCIAL

## 2025-05-12 NOTE — TELEPHONE ENCOUNTER
Caller: Fanny Winchester    Relationship to patient: Self    Best call back number: [phone] (home)     Patient is needing: PATIENT CALLED TO SEE IF THE PAPERS HER AND HER DAUGHTER DROPPED OFF ON 5/6/25 WERE FILLED OUT AND SENT BACK TO HER DAUGHTERS EMPLOYER.   SHE STATED SHE DID PAY THE PAPERWORK FEE.   PLEASE ADVISE ASAP - LVM IF NO ANSWER - NEED THIS SENT ASAP - IF IT CAN'T BE SENT VIA FAX, THEY WILL PICK IT UP

## 2025-05-12 NOTE — TELEPHONE ENCOUNTER
Returned call to patient, let her know that I will get the paperwork filled out on Wednesday and faxed over that morning.    Patient verbally understood.

## 2025-05-14 ENCOUNTER — HOSPITAL ENCOUNTER (OUTPATIENT)
Dept: MAMMOGRAPHY | Facility: HOSPITAL | Age: 60
Discharge: HOME | End: 2025-05-14
Admitting: OBSTETRICS & GYNECOLOGY
Payer: COMMERCIAL

## 2025-05-14 DIAGNOSIS — Z01.419 ROUTINE GYNECOLOGICAL EXAMINATION: ICD-10-CM

## 2025-05-14 PROCEDURE — 77067 SCR MAMMO BI INCL CAD: CPT

## 2025-05-14 PROCEDURE — 77063 BREAST TOMOSYNTHESIS BI: CPT | Performed by: RADIOLOGY

## 2025-05-14 PROCEDURE — 77063 BREAST TOMOSYNTHESIS BI: CPT

## 2025-05-14 PROCEDURE — 77067 SCR MAMMO BI INCL CAD: CPT | Performed by: RADIOLOGY

## 2025-05-19 DIAGNOSIS — M17.12 PRIMARY OSTEOARTHRITIS OF LEFT KNEE: ICD-10-CM

## 2025-05-20 ENCOUNTER — ANESTHESIA EVENT (OUTPATIENT)
Dept: PERIOP | Facility: HOSPITAL | Age: 60
End: 2025-05-20
Payer: COMMERCIAL

## 2025-05-20 RX ORDER — HYDROCODONE BITARTRATE AND ACETAMINOPHEN 5; 325 MG/1; MG/1
1 TABLET ORAL EVERY 8 HOURS PRN
Qty: 30 TABLET | Refills: 0 | OUTPATIENT
Start: 2025-05-20

## 2025-05-20 NOTE — OP NOTE
OPERATIVE REPORT    Date of Surgery:   05/21/25    Attending Surgeon:   Isaiah Meadows MD, MSE    ASSISTANTS:    Assistant: (Sergei Hurt CSA) was responsible for performing the following activities: Retraction, Suction, Suturing, Closing, and Placing Dressing and their skilled assistance was necessary for the success of this case.     PREOPERATIVE DIAGNOSIS:   Left knee osteoarthritis    POSTOPERATIVE DIAGNOSIS:   Same as above     PROCEDURE:   1. Left primary total knee arthroplasty, with robotic assistance (CORI)    Surgical Approach:        IMPLANTS:   : [employer]  Brand: Legion TKS  Femoral component size: 4 CR  Tibial component size: 2   Patellar Button: 29 oval  Bearing type: Dished  Insert Thickness: 9 mm    SURGICAL DETAILS:  Preoperative Passive Range of Motion: 10 to 143 degrees  Postoperative Passive Range of Motion: 1 to 134 degrees  Pre Op 7 degrees Varus,  Post Op 3 degrees Varus   Incision/arthrotomy type: Medial parapatellar  Posterior Cruciate Ligament: Retained  Lateral release: No  Estimated blood loss: 100 mL  Anesthesia type: Spinal and Regional  Tourniquet: Yes: 250 mmHG     INDICATIONS FOR PROCEDURE:  This patient presents today with end stage degenerative joint disease of the knee. The patient has failed non-operative treatment and presents for total knee replacement. Risks, complications, and benefits of the procedure have been discussed and all questions have been answered preoperatively.    PROCEDURE:  The patient was met in the preoperative holding area, evaluated, consent was obtained, and the Left knee was marked. The patient was brought to the operating room and placed on the operating room table in the supine position. After anesthesia was established, the patient was positioned supine. Bony prominences were padded. The patient was given prophylactic antibiotics within one hour of skin incision. The involved lower extremity was prepped and draped in usual sterile  fashion.  Surgical timeout was taken to confirm the operative side and planned procedure.    A straight incision was used medial to the midline carried through the subcutaneous tissue to the underlying extensor mechanism. A medial parapatellar approach was utilized.  A small medial peel was then performed using Bovie electrocautery and the fat pad was sharply excised. The patella was everted, a lateral facetectomy was performed, marginal osteophytes trimmed,  and the synovial margin was cauterized, clearing excess synovium.  The synovium at the superolateral aspect of the junction of the trochlea and the anterior cortex the femur was removed with Bovie electrocautery and the knee was flexed.  The tourniquet was deflated at this point time.  The ACL was then removed and all overhanging tibial and femoral osteophytes were removed with a rongeur.  The tibial and femoral reference arrays were assembled first. 2 drill tip threaded pins were placed medial to the tibial tubercle within the surgical field. The pins were inserted under power using the drill guide included in the CORI set for their relative positions. Similarly, the 2 femoral pins were placed medially in the femur just proxmial to the medial epicondyle. The sensor arrays were assembled to the pins    The hip center was registered. The medial and lateral malleoli were registered. The femur and tibia were registered.    The knee motion and balance was assessed with standard poses. Pre-operative cut planning was adjusted to provide knee balance throughout the range of motion    We then turned out attention to the distal femur and using the CORI bur the distal femur resection was performed.  2 bur holes were then placed and the distal femoral punch was introduced for the  holes for the 4 in 1 femoral cutting block.     We then turned to the tibia and using the probe with a flat disc attached to it the tibial cutting block was floated into place using robotic  navication. It was held in place with 2 a to p pins and one cross pin.  Depth resection, varus/valgus, and tibial slope were again confirmed. We then performed our tibial resection with a Z retractor medially, bent homman laterally, and PCL retractor posteriorly.  The tibia resection was then removed.    Attention was then turned back to the femur and the appropriately sized 4 in 1 femoral cutting block was malleted into place and secured with 2 convergent threaded pins.     A laminar  was placed and the medial and lateral menisci were excised. Posterior femoral osteophytes were removed with an osteotome. The bovie was used to cauterize the posterior knee capsule and meniscal remnants.     The tibial surface was then sized using the trial baseplate and secured with 2 pins.  Careful attention was taken to ensure it was aligned with the medial third of the tibial tubercle. The trial femur was then impacted into place and the appropriate sized trail poly was inserted.  Knee motion and balance were checked manually and with the robotic assistance. The patella was resurfaced. It was then cut flat at the appropriate depth.  The patella was then sized and lug holes were drilled off a guide for patellar component seating.  A trial patellar component was then placed and trialed to ensure appropriate tracking. The patella tracked well. The femoral lug drills were performed. The trial femoral components and poly were removed. The tracker pins and arrays were removed.    Attention was then turned to the tibial prep.  The keel drill and punch introduced.     The trial tibial implant was removed. All bone was then prepared with lavage and drying for preparation prior to final component placement. The final tibial component was cemented into position and excess cement was removed. The final femoral component was cemented into position and excess cement was removed. The final patellar component was cemented into place The  trial poly was placed. After cement curing, motion and stability was again tested. The final tibial insert was exchanged to the final tibial insert which was impacted into position.  Surgical site was irrigated with dilute Betadine solution and then normal saline with pulsatile lavage.  The medial and lateral capsular flaps as well as tibial and femoral periosteum was injected with a mixture consisting of 30 mL of 0.5% ropivacaine, 30 mg of ketorolac, and epinephrine 0.2 mg solution diluted in 30 mL of normal saline.    The arthrotomy was closed with #1 PDS Stratafix. The subcutaneous tissue was closed with 2-0 Monocryl. The skin was closed with running 3-0 Monocryl Stratafix and Dermabond a sterile THALIA dressing was placed.      The patient tolerated the procedure well and was taken to the recovery room in stable condition.  Following completion of the surgery all sponge, instrument, and needle counts were correct x2.    POSTOPERATIVE PLAN:   1. Weightbearing as tolerated on operative extremity  2. DVT prophylaxis    WOUND CLOSURE:  #1 PDS Stratafix  2-0 Monocryl subcuticular   3-0 Monocryl Stratafix skin  Dermabond  THALIA dressing  Ace wrap    SPONGE/INSTRUMENT/NEEDLE COUNTS:   Correct x 2    CONDITION ON DISCHARGE:   Stable    COMPLICATIONS:   One of the head pins sheared off well below the bony cut surface on the tibia.  Due to the morbidity associated with removing it and the likelihood for medial tibial plateau fracture the decision was made to leave this broken sterile threaded pin.  Isaiah Meadows MD, MSE

## 2025-05-20 NOTE — DISCHARGE INSTRUCTIONS
Total Knee Joint Replacement Discharge Instructions:    I. ACTIVITIES:  1. Exercises:  Complete exercise program as taught by the hospital physical therapist 2 times per day  Exercise program will be advanced by the physical therapist  During the day be up ambulating every 2 hours (while awake) for short distances  Complete the ankle pump exercises at least 10 times per hour (while awake)  Elevate legs most of the day the first week post operatively and thereafter elevate legs when in bed and for at least 30 minutes during the day. Caution must be taken to avoid pillow placement under the bend of the knee as this can led to flexion contractures of the knee.  Use cold packs 20-30 minutes approximately 5 times per day. This should be done before and after completing your exercises and at any time you are experiencing pain/ stiffness in your operative extremity.      2. Activities of Daily Living:  No tub baths, hot tubs, or swimming pools for 4 weeks  May shower and let water run over the incision on post-operative day #5 if no drainage. Do not scrub or rub the incision. Simply let the water run over the incision and pat dry.    II. Restrictions  Do not cross legs or kneel  Your surgeon will discuss with you when you will be able to drive again.  Weight bearing is as tolerated  First week stay inside on even terrain. May go up and down stairs one stair at a time utilizing the hand rail  After one week, you may venture outside.    III. Precautions:  Everyone that comes near you should wash their hands  No elective dental, genital-urinary, or colon procedures or surgical procedures for 12 weeks after surgery unless absolutely necessary.   If dental work or surgical procedure is deemed absolutely necessary, you will need to contact your surgeon as you will need to take antibiotics 1 hour prior to any dental work (including teeth cleanings).  Please discuss with your surgeon prophylactic antibiotics as the length of time  this intervention will be necessary for you varies with each patient’s health history and situation.  Avoid sick people. If you must be around someone who is ill, they should wear a mask.  Avoid visits to the Emergency Room or Urgent Care unless you are having a life threatening event.   If ordered stockings are to be placed on in the morning and removed at night. Monitor the stockings to ensure that any swelling is not causing the stockings to become too tight. In this case, remove stockings immediately.    IV. INCISION CARE:  May remove the Ace wrap 2 days following surgery  The negative pressure dressing with the battery pack is waterproof and should remain in place for 7 days.  You may shower with the negative pressure dressing as it is waterproof  Following removal of the dressing on day 7 you may shower and allow water to run over the incision  No submersion of incision in water such as tubs pools hot tubs etc.  No creams or ointments to the incision  Do not touch or pick at the incision  Check incision/dressing every day and notify surgeon immediately if any of the following signs or symptoms are noted:  Increase in redness  Increase in swelling around the incision and of the entire extremity  Increase in pain  Drainage oozing from the incision  Pulling apart of the edges of the incision  Increase in overall body temperature (greater than 100.5 degrees)  Your surgeon will instruct you regarding suture or staple removal    V. Medications:   1. Anticoagulants: You will be discharged on an anticoagulant. This is a prophylactic medication that helps prevent blood clots during your post-operative period. The type and length of dosage varies based on your individual needs, procedure performed, and surgeon’s preference.  While taking the anticoagulant, you should avoid taking any additional aspirin, ibuprofen (Advil or Motrin), Aleve (Naprosyn) or other non-steroidal anti-inflammatory medications.   Notify surgeon  immediately if any bo bleeding is noted in the urine, stool, emesis, or from the nose or the incision. Blood in the stool will often appear as black rather than red. Blood in urine may appear as pink. Blood in emesis may appear as brown/black like coffee grounds.  You will need to apply pressure for longer periods of time to any cuts or abrasions to stop bleeding  Avoid alcohol while taking anticoagulants    2. Stool Softeners: You will be at greater risk of constipation after surgery due to being less mobile and the pain medications.   Take stool softeners as instructed by your surgeon while on pain medications. Over the counter Colace 100 mg 1-2 capsules twice daily.   If stools become too loose or too frequent, please decreases the dosage or stop the stool softener.  If constipation occurs despite use of stool softeners, you are to continue the stool softeners and add a laxative (Milk of Magnesia 1 ounce daily as needed)  Drink plenty of fluids, and eat fruits and vegetables during your recovery time    3. Pain Medications utilized after surgery are narcotics and the law requires that the following information be given to all patients that are prescribed narcotics:  CLASSIFICATION: Pain medications are called Opioids and are narcotics  LEGALITIES: It is illegal to share narcotics with others and to drive within 24 hours of taking narcotics  POTENTIAL SIDE EFFECTS: Potential side effects of opioids include: nausea, vomiting, itching, dizziness, drowsiness, dry mouth, constipation, and difficulty urinating.  POTENTIAL ADVERSE EFFECTS:   Opioid tolerance can develop with use of pain medications and this simply means that it requires more and more of the medication to control pain; however, this is seen more in patients that use opioids for longer periods of time.  Opioid dependence can develop with use of Opioids and this simply means that to stop the medication can cause withdrawal symptoms; however, this is  seen with patients that use Opioids for longer periods of time.  Opioid addiction can develop with use of Opioids and the incidence of this is very unlikely in patients who take the medications as ordered and stop the medications as instructed.  Opioid overdose can be dangerous, but is unlikely when the medication is taken as ordered and stopped when ordered. It is important not to mix opioids with alcohol or with and type of sedative such as Benadryl as this can lead to over sedation and respiratory difficulty.  DOSAGE:   Pain medications will need to be taken consistently for the first week to decrease pain and promote adequate pain relief and participation in physical therapy.  After the initial surgical pain begins to resolve, you may begin to decrease the pain medication. By the end of 6 weeks, you should be off of pain medications.  Refills will not be given by the office during evening hours, on weekends, or after 6 weeks post-op.  To seek refills on pain medications during the initial 6 week post-operative period, you must call the office 48 hours in advance to request the refill. The office will then notify you when to  the prescription. DO NOT wait until you are out of the medication to request a refill.    V. FOLLOW-UP VISITS:  You will need to follow up in the office with your surgeon in 2 weeks. Please call this number [phone] to schedule this appointment.  If you have any concerns or suspected complications prior to your follow up visit, please call your surgeons office. Do not wait until your appointment time if you suspect complications. These will need to be addressed in the office promptly.

## 2025-05-20 NOTE — H&P
[facility]   HISTORY AND PHYSICAL    Patient Name:Fanny Winchester  : 1965  MRN: [id number]  Primary Care Physician: Chris Perez MD  Date of admission: (Not on file)    Subjective   Subjective     Chief Complaint: left knee pain/OA    History of Present Illness   Fanny Winchester is a 59 y.o. female who presents to the hospital today for left knee pain and osteoarthritis.  Patient states the knee pain is located anteromedially and is worse with activity and better with rest.  The patient has exhausted conservative treatments.  The patient states that her quality of life is suffered significantly.  The patient was seen by me in the office at an earlier date and was scheduled for left knee arthroplasty with [employer].  The patient presents to the hospital today for that procedure.       Review of Systems   Musculoskeletal:  Positive for arthralgias, gait problem, joint swelling and myalgias.   All other systems reviewed and are negative.        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   • DDD (degenerative disc disease), lumbar 2022   • Fibromyalgia    • GERD (gastroesophageal reflux disease)    • Osteoarthritis    • Rheumatoid arthritis involving multiple sites        Past Surgical History:   Procedure Laterality Date   • COLONOSCOPY N/A 2020    Procedure: COLONOSCOPY with polypectomy;  Surgeon: Adarsh Arredondo MD;  Location: [facility] OR;  Service: Gastroenterology;  Laterality: N/A;  Rectal polyp   • ENDOSCOPY N/A 2020    Procedure: ESOPHAGOGASTRODUODENOSCOPY with biopsies;  Surgeon: Adarsh Arredondo MD;  Location: [facility] OR;  Service: Gastroenterology;  Laterality: N/A;  Reflux esophagitis  Gastritis  Duodenitis  Hiatal hernia  Biopsies: distal esophagus, gastric, duodenum   • GANGLION CYST EXCISION Left    • HYSTERECTOMY     • OOPHORECTOMY Right 2020   • TUMOR REMOVAL     •  ULNAR NERVE TRANSPOSITION Left 2019       Family History: Her family history includes Colon cancer in her maternal aunt; Colon polyps in her maternal aunt; Heart disease in her father and mother; Hyperlipidemia in her father; Hypertension in her father; Lupus in her mother.     Social History: She  reports that she has never smoked. She has never used smokeless tobacco. She reports that she does not currently use alcohol. She reports that she does not use drugs.    Home Medications:  HYDROcodone-acetaminophen, hydroxychloroquine, and meloxicam    Allergies:  She has no known allergies.    Objective    Objective     Vitals:         Physical Exam   Left Knee Exam      Tenderness   The patient is experiencing tenderness in the medial retinaculum and medial joint line.     Range of Motion   Extension:  0   Flexion:  120      Tests   Varus: negative Valgus: negative  Lachman:  Anterior - negative    Posterior - negative  Drawer:  Anterior - negative     Posterior - negative     Other   Erythema: absent  Scars: absent  Sensation: normal  Pulse: present  Swelling: mild  Effusion: no effusion present    Result Review    Result Review:  I have personally reviewed the results from the time of this admission to 5/20/2025 12:29 EDT and agree with these findings:  []  Laboratory list / accordion  []  Microbiology  []  Radiology  []  EKG/Telemetry   []  Cardiology/Vascular   []  Pathology  []  Old records  []  Other:  Most notable findings include:   Imaging: 3 views of the left knee   Indication: left knee pain  Findings: X-rays demonstrate no acute osseous abnormality.  There is no signs of fracture dislocation or subluxation.  There is severe joint space narrowing, subchondral sclerosis, cystic changes, and periarticular osteophytes most pronounced in the Medial joint.  Comparative studies: None      Assessment & Plan   Assessment / Plan     Brief Patient Summary:  Fanny Winchester is a 59 y.o. female with left knee  OA    Active Hospital Problems:  Active Hospital Problems    Diagnosis    • **Primary osteoarthritis of left knee      Plan:   Cc: f/u left knee pain, DJD    Patient presented to the hospital today for scheduled left total knee arthroplasty.    I reviewed anatomy and a model of a total knee arthroplasty, as well as typical postoperative recovery of 6-12 months before maximal recovery, and possible need for rehabilitation stay after hospitalization. We also discussed risks, benefits, and alternatives of procedure with risks including but not limited to neurovascular damage, bleeding, infection, malalignment, chronic pian, failure of implants, osteolysis, loosening of implants, loss of motion, weakness, stiffness, instability, DVT, pulmonary embolus, death, stroke, complex regional pain syndrome, myocardial infarction, and need for additional procedures. Patient understood all these and had all questions answered before consenting to the procedure. No guarantees were given in regards to results from the surgery.  Patient has been medically optimize by his primary care physician.    Plan for left total knee arthroplasty.    Implants: [employer] cemented Legion TKS with [employer]  Anticoagulation: Asprin  Antibiotics: Cefazolin  Admission Type: 23 HR Obs  Post op ABX: No  TXA: Yes    All remaining questions answered today.     VTE Prophylaxis:  No VTE prophylaxis order currently exists.        Isaiah Meadows MD

## 2025-05-21 ENCOUNTER — APPOINTMENT (OUTPATIENT)
Dept: GENERAL RADIOLOGY | Facility: HOSPITAL | Age: 60
End: 2025-05-21
Payer: COMMERCIAL

## 2025-05-21 ENCOUNTER — APPOINTMENT (OUTPATIENT)
Dept: ULTRASOUND IMAGING | Facility: HOSPITAL | Age: 60
End: 2025-05-21
Payer: COMMERCIAL

## 2025-05-21 ENCOUNTER — HOSPITAL ENCOUNTER (OUTPATIENT)
Facility: HOSPITAL | Age: 60
Setting detail: OBSERVATION
Discharge: HOME | End: 2025-05-22
Attending: INTERNAL MEDICINE | Admitting: ANESTHESIOLOGY
Payer: COMMERCIAL

## 2025-05-21 ENCOUNTER — ANESTHESIA (OUTPATIENT)
Dept: PERIOP | Facility: HOSPITAL | Age: 60
End: 2025-05-21
Payer: COMMERCIAL

## 2025-05-21 DIAGNOSIS — M17.12 PRIMARY OSTEOARTHRITIS OF LEFT KNEE: ICD-10-CM

## 2025-05-21 DIAGNOSIS — Z96.652 S/P TKR (TOTAL KNEE REPLACEMENT), LEFT: Primary | ICD-10-CM

## 2025-05-21 PROCEDURE — G0378 HOSPITAL OBSERVATION PER HR: HCPCS

## 2025-05-21 PROCEDURE — 25010000002 KETOROLAC TROMETHAMINE PER 15 MG: Performed by: INTERNAL MEDICINE

## 2025-05-21 PROCEDURE — C1776 JOINT DEVICE (IMPLANTABLE): HCPCS | Performed by: INTERNAL MEDICINE

## 2025-05-21 PROCEDURE — 25010000002 PROPOFOL 1000 MG/100ML EMULSION

## 2025-05-21 PROCEDURE — C1713 ANCHOR/SCREW BN/BN,TIS/BN: HCPCS | Performed by: INTERNAL MEDICINE

## 2025-05-21 PROCEDURE — 25010000002 LIDOCAINE PF 1% 1 % SOLUTION

## 2025-05-21 PROCEDURE — 25010000002 MIDAZOLAM PER 1MG: Performed by: ANESTHESIOLOGY

## 2025-05-21 PROCEDURE — 25010000002 BUPIVACAINE (PF) 0.25 % SOLUTION

## 2025-05-21 PROCEDURE — 97165 OT EVAL LOW COMPLEX 30 MIN: CPT

## 2025-05-21 PROCEDURE — 25010000002 CEFAZOLIN PER 500 MG: Performed by: INTERNAL MEDICINE

## 2025-05-21 PROCEDURE — 25010000002 MEPIVACAINE PF 2 % SOLUTION 20 ML VIAL

## 2025-05-21 PROCEDURE — 25010000002 LIDOCAINE 2% SOLUTION

## 2025-05-21 PROCEDURE — 25010000002 FAMOTIDINE 10 MG/ML SOLUTION: Performed by: ANESTHESIOLOGY

## 2025-05-21 PROCEDURE — 25010000002 ONDANSETRON PER 1 MG: Performed by: ANESTHESIOLOGY

## 2025-05-21 PROCEDURE — 97161 PT EVAL LOW COMPLEX 20 MIN: CPT

## 2025-05-21 PROCEDURE — 25810000003 LACTATED RINGERS PER 1000 ML: Performed by: ANESTHESIOLOGY

## 2025-05-21 PROCEDURE — 25010000002 EPINEPHRINE 1 MG/ML SOLUTION 1 ML VIAL: Performed by: INTERNAL MEDICINE

## 2025-05-21 PROCEDURE — 73560 X-RAY EXAM OF KNEE 1 OR 2: CPT

## 2025-05-21 PROCEDURE — 25010000002 DEXAMETHASONE PER 1 MG: Performed by: ANESTHESIOLOGY

## 2025-05-21 PROCEDURE — 25010000002 ROPIVACAINE PER 1 MG: Performed by: INTERNAL MEDICINE

## 2025-05-21 PROCEDURE — 94761 N-INVAS EAR/PLS OXIMETRY MLT: CPT

## 2025-05-21 DEVICE — STRATAFIX (SPIRAL PDS PLUS), BIDIRECTIONAL
Type: IMPLANTABLE DEVICE | Site: KNEE | Status: FUNCTIONAL
Brand: STRATAFIX

## 2025-05-21 DEVICE — LEGION HIGHLY CROSS LINKED                                    POLYETHYLENE DISHED INSERT SIZE 1-2 9MM
Type: IMPLANTABLE DEVICE | Site: KNEE | Status: FUNCTIONAL
Brand: LEGION

## 2025-05-21 DEVICE — KNOTLESS TISSUE CONTROL DEVICE, UNDYED UNIDIRECTIONAL (ANTIBACTERIAL) SYNTHETIC ABSORBABLE DEVICE
Type: IMPLANTABLE DEVICE | Site: KNEE | Status: FUNCTIONAL
Brand: STRATAFIX

## 2025-05-21 DEVICE — GENESIS II NON-POROUS TIBIAL                                    BASEPLATE SIZE 2 LEFT
Type: IMPLANTABLE DEVICE | Site: KNEE | Status: FUNCTIONAL
Brand: GENESIS II

## 2025-05-21 DEVICE — GENESIS II OVAL RESURFACING                                    PATELLAR 29MM
Type: IMPLANTABLE DEVICE | Site: KNEE | Status: FUNCTIONAL
Brand: GENESIS II

## 2025-05-21 DEVICE — PALACOS® MV IS INTENDED FOR USE IN ARTHROPLASTIC PROCEDURES OF THE HIP, KNEE, AND OTHER JOINTS FOR THE FIXATION OF POLYMER OR METALLIC PROSTHETIC IMPLANTS TO LIVING BONE. PALACOS® MV CONTAINS THE X-RAY CONTRAST MEDIUM ZIRCONIUM DIOXIDE. TO IMPROVE VISIBILITY IN THE SURGICAL FIELD PALACOS® MV HAS BEEN COLOURED WITH CHLOROPHYLL (E141).THE BONE CEMENT IS PREPARED DIRECTLY BEFORE USE BY MIXING A POLYMER POWDER COMPONENT WITH A LIQUID MONOMER COMPONENT. A DUCTILE DOUGH FORMS WHICH CURES WITHIN A FEW MINUTES.
Type: IMPLANTABLE DEVICE | Site: KNEE | Status: FUNCTIONAL
Brand: PALACOS®

## 2025-05-21 DEVICE — LEGION CRUCIATE RETAINING OXINIUM                                    FEMORAL SIZE 4 LEFT
Type: IMPLANTABLE DEVICE | Site: KNEE | Status: FUNCTIONAL
Brand: LEGION

## 2025-05-21 DEVICE — IMPLANTABLE DEVICE: Type: IMPLANTABLE DEVICE | Site: KNEE | Status: FUNCTIONAL

## 2025-05-21 RX ORDER — LIDOCAINE HYDROCHLORIDE 10 MG/ML
INJECTION, SOLUTION EPIDURAL; INFILTRATION; INTRACAUDAL; PERINEURAL
Status: COMPLETED | OUTPATIENT
Start: 2025-05-21 | End: 2025-05-21

## 2025-05-21 RX ORDER — OXYCODONE HYDROCHLORIDE 5 MG/1
10 TABLET ORAL EVERY 4 HOURS PRN
Status: DISCONTINUED | OUTPATIENT
Start: 2025-05-21 | End: 2025-05-22 | Stop reason: HOSPADM

## 2025-05-21 RX ORDER — FAMOTIDINE 10 MG/ML
20 INJECTION, SOLUTION INTRAVENOUS
Status: COMPLETED | OUTPATIENT
Start: 2025-05-21 | End: 2025-05-21

## 2025-05-21 RX ORDER — EPHEDRINE SULFATE 50 MG/ML
INJECTION INTRAVENOUS AS NEEDED
Status: DISCONTINUED | OUTPATIENT
Start: 2025-05-21 | End: 2025-05-21 | Stop reason: SURG

## 2025-05-21 RX ORDER — FAMOTIDINE 20 MG/1
40 TABLET, FILM COATED ORAL DAILY
Status: DISCONTINUED | OUTPATIENT
Start: 2025-05-22 | End: 2025-05-22 | Stop reason: HOSPADM

## 2025-05-21 RX ORDER — PROPOFOL 10 MG/ML
INJECTION, EMULSION INTRAVENOUS AS NEEDED
Status: DISCONTINUED | OUTPATIENT
Start: 2025-05-21 | End: 2025-05-21 | Stop reason: SURG

## 2025-05-21 RX ORDER — ASPIRIN 81 MG/1
81 TABLET ORAL EVERY 12 HOURS SCHEDULED
Status: DISCONTINUED | OUTPATIENT
Start: 2025-05-22 | End: 2025-05-22 | Stop reason: HOSPADM

## 2025-05-21 RX ORDER — MORPHINE SULFATE 2 MG/ML
2 INJECTION, SOLUTION INTRAMUSCULAR; INTRAVENOUS
Status: DISCONTINUED | OUTPATIENT
Start: 2025-05-21 | End: 2025-05-22 | Stop reason: HOSPADM

## 2025-05-21 RX ORDER — TRANEXAMIC ACID 10 MG/ML
1000 INJECTION, SOLUTION INTRAVENOUS ONCE
Status: COMPLETED | OUTPATIENT
Start: 2025-05-21 | End: 2025-05-21

## 2025-05-21 RX ORDER — SODIUM CHLORIDE 9 MG/ML
40 INJECTION, SOLUTION INTRAVENOUS AS NEEDED
Status: DISCONTINUED | OUTPATIENT
Start: 2025-05-21 | End: 2025-05-21 | Stop reason: HOSPADM

## 2025-05-21 RX ORDER — LIDOCAINE HYDROCHLORIDE 20 MG/ML
INJECTION, SOLUTION INFILTRATION; PERINEURAL AS NEEDED
Status: DISCONTINUED | OUTPATIENT
Start: 2025-05-21 | End: 2025-05-21 | Stop reason: SURG

## 2025-05-21 RX ORDER — SODIUM CHLORIDE 0.9 % (FLUSH) 0.9 %
10 SYRINGE (ML) INJECTION AS NEEDED
Status: DISCONTINUED | OUTPATIENT
Start: 2025-05-21 | End: 2025-05-21 | Stop reason: HOSPADM

## 2025-05-21 RX ORDER — ONDANSETRON 2 MG/ML
4 INJECTION INTRAMUSCULAR; INTRAVENOUS ONCE AS NEEDED
Status: DISCONTINUED | OUTPATIENT
Start: 2025-05-21 | End: 2025-05-21 | Stop reason: HOSPADM

## 2025-05-21 RX ORDER — AMOXICILLIN 250 MG
2 CAPSULE ORAL 2 TIMES DAILY
Qty: 120 TABLET | Refills: 0 | Status: SHIPPED | OUTPATIENT
Start: 2025-05-21

## 2025-05-21 RX ORDER — OXYCODONE AND ACETAMINOPHEN 7.5; 325 MG/1; MG/1
1 TABLET ORAL ONCE AS NEEDED
Status: DISCONTINUED | OUTPATIENT
Start: 2025-05-21 | End: 2025-05-21 | Stop reason: HOSPADM

## 2025-05-21 RX ORDER — CHLORHEXIDINE GLUCONATE 500 MG/1
CLOTH TOPICAL ONCE
Status: DISCONTINUED | OUTPATIENT
Start: 2025-05-21 | End: 2025-05-22 | Stop reason: HOSPADM

## 2025-05-21 RX ORDER — OXYCODONE AND ACETAMINOPHEN 5; 325 MG/1; MG/1
1 TABLET ORAL EVERY 4 HOURS PRN
Qty: 30 TABLET | Refills: 0 | Status: SHIPPED | OUTPATIENT
Start: 2025-05-21

## 2025-05-21 RX ORDER — TRANEXAMIC ACID 10 MG/ML
1000 INJECTION, SOLUTION INTRAVENOUS ONCE
Status: DISCONTINUED | OUTPATIENT
Start: 2025-05-21 | End: 2025-05-21 | Stop reason: HOSPADM

## 2025-05-21 RX ORDER — SODIUM CHLORIDE 0.9 % (FLUSH) 0.9 %
10 SYRINGE (ML) INJECTION EVERY 12 HOURS SCHEDULED
Status: DISCONTINUED | OUTPATIENT
Start: 2025-05-21 | End: 2025-05-21 | Stop reason: HOSPADM

## 2025-05-21 RX ORDER — ONDANSETRON 4 MG/1
4 TABLET, ORALLY DISINTEGRATING ORAL EVERY 6 HOURS PRN
Status: DISCONTINUED | OUTPATIENT
Start: 2025-05-21 | End: 2025-05-22 | Stop reason: HOSPADM

## 2025-05-21 RX ORDER — MELOXICAM 7.5 MG/1
15 TABLET ORAL DAILY
Status: DISCONTINUED | OUTPATIENT
Start: 2025-05-22 | End: 2025-05-22 | Stop reason: HOSPADM

## 2025-05-21 RX ORDER — DEXMEDETOMIDINE HYDROCHLORIDE 100 UG/ML
INJECTION, SOLUTION INTRAVENOUS
Status: COMPLETED | OUTPATIENT
Start: 2025-05-21 | End: 2025-05-21

## 2025-05-21 RX ORDER — ONDANSETRON 4 MG/1
4 TABLET, FILM COATED ORAL EVERY 8 HOURS PRN
Qty: 30 TABLET | Refills: 0 | Status: SHIPPED | OUTPATIENT
Start: 2025-05-21

## 2025-05-21 RX ORDER — MIDAZOLAM HYDROCHLORIDE 2 MG/2ML
1 INJECTION, SOLUTION INTRAMUSCULAR; INTRAVENOUS
Status: DISCONTINUED | OUTPATIENT
Start: 2025-05-21 | End: 2025-05-21 | Stop reason: HOSPADM

## 2025-05-21 RX ORDER — OXYCODONE HYDROCHLORIDE 5 MG/1
5 TABLET ORAL EVERY 4 HOURS PRN
Status: DISCONTINUED | OUTPATIENT
Start: 2025-05-21 | End: 2025-05-22 | Stop reason: HOSPADM

## 2025-05-21 RX ORDER — NALOXONE HCL 0.4 MG/ML
0.4 VIAL (ML) INJECTION
Status: DISCONTINUED | OUTPATIENT
Start: 2025-05-21 | End: 2025-05-22 | Stop reason: HOSPADM

## 2025-05-21 RX ORDER — MAGNESIUM HYDROXIDE 1200 MG/15ML
LIQUID ORAL AS NEEDED
Status: DISCONTINUED | OUTPATIENT
Start: 2025-05-21 | End: 2025-05-21 | Stop reason: HOSPADM

## 2025-05-21 RX ORDER — ASPIRIN 81 MG/1
81 TABLET ORAL 2 TIMES DAILY
Qty: 60 TABLET | Refills: 0 | Status: SHIPPED | OUTPATIENT
Start: 2025-05-21

## 2025-05-21 RX ORDER — BUPIVACAINE HYDROCHLORIDE 2.5 MG/ML
INJECTION, SOLUTION EPIDURAL; INFILTRATION; INTRACAUDAL; PERINEURAL
Status: COMPLETED | OUTPATIENT
Start: 2025-05-21 | End: 2025-05-21

## 2025-05-21 RX ORDER — ACETAMINOPHEN 500 MG
1000 TABLET ORAL 3 TIMES DAILY
Status: DISCONTINUED | OUTPATIENT
Start: 2025-05-21 | End: 2025-05-22 | Stop reason: HOSPADM

## 2025-05-21 RX ORDER — SODIUM CHLORIDE, SODIUM LACTATE, POTASSIUM CHLORIDE, CALCIUM CHLORIDE 600; 310; 30; 20 MG/100ML; MG/100ML; MG/100ML; MG/100ML
100 INJECTION, SOLUTION INTRAVENOUS ONCE
Status: DISCONTINUED | OUTPATIENT
Start: 2025-05-21 | End: 2025-05-21 | Stop reason: HOSPADM

## 2025-05-21 RX ORDER — DEXAMETHASONE SODIUM PHOSPHATE 4 MG/ML
8 INJECTION, SOLUTION INTRA-ARTICULAR; INTRALESIONAL; INTRAMUSCULAR; INTRAVENOUS; SOFT TISSUE ONCE AS NEEDED
Status: COMPLETED | OUTPATIENT
Start: 2025-05-21 | End: 2025-05-21

## 2025-05-21 RX ORDER — PREGABALIN 75 MG/1
150 CAPSULE ORAL ONCE
Status: COMPLETED | OUTPATIENT
Start: 2025-05-21 | End: 2025-05-21

## 2025-05-21 RX ORDER — ONDANSETRON 2 MG/ML
4 INJECTION INTRAMUSCULAR; INTRAVENOUS EVERY 6 HOURS PRN
Status: DISCONTINUED | OUTPATIENT
Start: 2025-05-21 | End: 2025-05-22 | Stop reason: HOSPADM

## 2025-05-21 RX ORDER — SODIUM CHLORIDE, SODIUM LACTATE, POTASSIUM CHLORIDE, CALCIUM CHLORIDE 600; 310; 30; 20 MG/100ML; MG/100ML; MG/100ML; MG/100ML
9 INJECTION, SOLUTION INTRAVENOUS CONTINUOUS
Status: ACTIVE | OUTPATIENT
Start: 2025-05-21 | End: 2025-05-22

## 2025-05-21 RX ORDER — LIDOCAINE HYDROCHLORIDE 10 MG/ML
0.5 INJECTION, SOLUTION EPIDURAL; INFILTRATION; INTRACAUDAL; PERINEURAL ONCE AS NEEDED
Status: DISCONTINUED | OUTPATIENT
Start: 2025-05-21 | End: 2025-05-21 | Stop reason: HOSPADM

## 2025-05-21 RX ORDER — ONDANSETRON 2 MG/ML
4 INJECTION INTRAMUSCULAR; INTRAVENOUS ONCE AS NEEDED
Status: COMPLETED | OUTPATIENT
Start: 2025-05-21 | End: 2025-05-21

## 2025-05-21 RX ADMIN — TRANEXAMIC ACID 1000 MG: 10 INJECTION, SOLUTION INTRAVENOUS at 08:57

## 2025-05-21 RX ADMIN — BUPIVACAINE HYDROCHLORIDE 15 ML: 2.5 INJECTION, SOLUTION EPIDURAL; INFILTRATION; INTRACAUDAL; PERINEURAL at 07:52

## 2025-05-21 RX ADMIN — TRANEXAMIC ACID 1000 MG: 10 INJECTION, SOLUTION INTRAVENOUS at 10:04

## 2025-05-21 RX ADMIN — OXYCODONE HYDROCHLORIDE 10 MG: 5 TABLET ORAL at 20:32

## 2025-05-21 RX ADMIN — DEXAMETHASONE SODIUM PHOSPHATE 8 MG: 4 INJECTION INTRA-ARTICULAR; INTRALESIONAL; INTRAMUSCULAR; INTRAVENOUS; SOFT TISSUE at 07:23

## 2025-05-21 RX ADMIN — LIDOCAINE HYDROCHLORIDE 300 MG: 20 INJECTION, SOLUTION INFILTRATION; PERINEURAL at 08:48

## 2025-05-21 RX ADMIN — DEXMEDETOMIDINE 20 MCG: 100 INJECTION, SOLUTION, CONCENTRATE INTRAVENOUS at 07:48

## 2025-05-21 RX ADMIN — LIDOCAINE HYDROCHLORIDE 30 MG: 10 INJECTION, SOLUTION EPIDURAL; INFILTRATION; INTRACAUDAL; PERINEURAL at 07:51

## 2025-05-21 RX ADMIN — FAMOTIDINE 20 MG: 10 INJECTION INTRAVENOUS at 07:24

## 2025-05-21 RX ADMIN — MEPIVACAINE HYDROCHLORIDE 2 ML: 20 INJECTION, SOLUTION EPIDURAL; INFILTRATION at 08:25

## 2025-05-21 RX ADMIN — SODIUM CHLORIDE 2000 MG: 9 INJECTION, SOLUTION INTRAVENOUS at 16:24

## 2025-05-21 RX ADMIN — EPHEDRINE SULFATE 5 MG: 50 INJECTION INTRAVENOUS at 09:14

## 2025-05-21 RX ADMIN — SODIUM CHLORIDE, POTASSIUM CHLORIDE, SODIUM LACTATE AND CALCIUM CHLORIDE 9 ML/HR: 600; 310; 30; 20 INJECTION, SOLUTION INTRAVENOUS at 07:24

## 2025-05-21 RX ADMIN — OXYCODONE HYDROCHLORIDE 10 MG: 5 TABLET ORAL at 12:26

## 2025-05-21 RX ADMIN — PROPOFOL 125 MCG/KG/MIN: 10 INJECTION, EMULSION INTRAVENOUS at 08:49

## 2025-05-21 RX ADMIN — DEXMEDETOMIDINE HYDROCHLORIDE 10 MCG: 100 INJECTION, SOLUTION INTRAVENOUS at 07:52

## 2025-05-21 RX ADMIN — LIDOCAINE HYDROCHLORIDE 30 MG: 10 INJECTION, SOLUTION EPIDURAL; INFILTRATION; INTRACAUDAL; PERINEURAL at 07:46

## 2025-05-21 RX ADMIN — MIDAZOLAM HYDROCHLORIDE 1 MG: 1 INJECTION, SOLUTION INTRAMUSCULAR; INTRAVENOUS at 07:45

## 2025-05-21 RX ADMIN — ACETAMINOPHEN 1000 MG: 500 TABLET, FILM COATED ORAL at 20:33

## 2025-05-21 RX ADMIN — ONDANSETRON 4 MG: 2 INJECTION INTRAMUSCULAR; INTRAVENOUS at 07:24

## 2025-05-21 RX ADMIN — ACETAMINOPHEN 1000 MG: 500 TABLET, FILM COATED ORAL at 16:24

## 2025-05-21 RX ADMIN — PROPOFOL 30 MG: 10 INJECTION, EMULSION INTRAVENOUS at 08:48

## 2025-05-21 RX ADMIN — PREGABALIN 150 MG: 75 CAPSULE ORAL at 07:23

## 2025-05-21 RX ADMIN — BUPIVACAINE HYDROCHLORIDE 12 ML: 2.5 INJECTION, SOLUTION EPIDURAL; INFILTRATION; INTRACAUDAL; PERINEURAL at 07:48

## 2025-05-21 RX ADMIN — OXYCODONE HYDROCHLORIDE 10 MG: 5 TABLET ORAL at 16:49

## 2025-05-21 RX ADMIN — SODIUM CHLORIDE 2 G: 9 INJECTION, SOLUTION INTRAVENOUS at 08:50

## 2025-05-21 NOTE — PLAN OF CARE
Goal Outcome Evaluation:  Plan of Care Reviewed With: patient        Progress: improving  Outcome Evaluation: VSS. Room air. Pulse ox in place. Patient states pain controlled with scheduled and PRN pain meds. Ice pack in place. L TKA dressing C/D/I. Stand-by assist with walker. Doing well. Daughter at bedside.

## 2025-05-21 NOTE — ANESTHESIA PREPROCEDURE EVALUATION
Anesthesia Evaluation     Patient summary reviewed and Nursing notes reviewed   no history of anesthetic complications:   NPO Solid Status: > 8 hours  NPO Liquid Status: > 2 hours           Airway   Mallampati: II  TM distance: >3 FB  Neck ROM: full  No difficulty expected  Dental - normal exam     Pulmonary - negative pulmonary ROS and normal exam   Cardiovascular - normal exam  Exercise tolerance: good (4-7 METS)    ECG reviewed        Neuro/Psych  GI/Hepatic/Renal/Endo    (+) obesity, hiatal hernia    Musculoskeletal     (+) chronic pain  Abdominal  - normal exam   Substance History   (+) alcohol use     OB/GYN negative ob/gyn ROS         Other   arthritis (RA, OA, Fibromyalgia), autoimmune disease rheumatoid arthritis,     ROS/Med Hx Other: Lemonade (no particulates) at 0500                Anesthesia Plan    ASA 2     MAC, regional and spinal     intravenous induction     Anesthetic plan, risks, benefits, and alternatives have been provided, discussed and informed consent has been obtained with: patient.    Use of blood products discussed with patient  Consented to blood products.    Plan discussed with CRNA.

## 2025-05-21 NOTE — ANESTHESIA PROCEDURE NOTES
Spinal Block    Pre-sedation assessment completed: 5/21/2025 8:20 AM    Patient reassessed immediately prior to procedure    Patient location during procedure: pre-op  Start Time: 5/21/2025 8:21 AM  Stop Time: 5/21/2025 8:25 AM  Indication:at surgeon's request and post-op pain management  Performed By  CRNA/CAA: Ce Giordano, CRNA  Preanesthetic Checklist  Completed: patient identified, IV checked, site marked, risks and benefits discussed, surgical consent, monitors and equipment checked, pre-op evaluation and timeout performed  Spinal Block Prep:  Patient Position:sitting  Sterile Tech:cap, gloves, mask and sterile barriers  Prep:Chloraprep  Patient Monitoring:blood pressure monitoring, continuous pulse oximetry and EKG    Spinal Block Procedure  Approach:midline  Guidance:landmark technique and palpation technique  Location:L3-L4  Needle Type:Sprotte  Needle Gauge:25 G  Placement of Spinal needle event:cerebrospinal fluid aspirated  Paresthesia: no  Fluid Appearance:clear     Post Assessment  Patient Tolerance:patient tolerated the procedure well with no apparent complications  Complications no  Additional Notes  Attempt 1: L4-5. Pt struggled with positioning. Continue to hit OS. Removed spinal needle and had patient reposition.   Attempt 2: L3-4. Successful    1% PF lidocaine used for local infiltration

## 2025-05-21 NOTE — THERAPY EVALUATION
Patient Name: Fanny Winchester  : 1965    MRN: [id number]                              Today's Date: 2025       Admit Date: 2025    Visit Dx:     ICD-10-CM ICD-9-CM   1. S/P TKR (total knee replacement), left  Z96.652 V43.65   2. Primary osteoarthritis of left knee  M17.12 715.16     Patient Active Problem List   Diagnosis    Gastroesophageal reflux disease with esophagitis without hemorrhage    Fibromyalgia    Rheumatoid arthritis    DDD (degenerative disc disease), lumbar    Primary osteoarthritis of both knees    Elevated fasting glucose    Mixed hyperlipidemia    LGSIL Pap smear of vagina    Primary osteoarthritis of left knee    OA (osteoarthritis) of knee    S/P TKR (total knee replacement), left     Past Medical History:   Diagnosis Date    Anxiety     Mason tumor 2020    ovary    DDD (degenerative disc disease), lumbar 2022    Fibromyalgia     GERD (gastroesophageal reflux disease)     Osteoarthritis     Rheumatoid arthritis involving multiple sites      Past Surgical History:   Procedure Laterality Date    COLONOSCOPY N/A 2020    Procedure: COLONOSCOPY with polypectomy;  Surgeon: Adarsh Arredondo MD;  Location: [facility];  Service: Gastroenterology;  Laterality: N/A;  Rectal polyp    ENDOSCOPY N/A 2020    Procedure: ESOPHAGOGASTRODUODENOSCOPY with biopsies;  Surgeon: Adarsh Arredondo MD;  Location: [facility];  Service: Gastroenterology;  Laterality: N/A;  Reflux esophagitis  Gastritis  Duodenitis  Hiatal hernia  Biopsies: distal esophagus, gastric, duodenum    GANGLION CYST EXCISION Left 2019    HYSTERECTOMY  2000    OOPHORECTOMY Right 2020    TUMOR REMOVAL      ULNAR NERVE TRANSPOSITION Left       General Information       Row Name 25 1320          OT Time and Intention    Subjective Information complains of;pain  -EN     Document Type evaluation  -EN     Mode of Treatment occupational therapy  -EN     Patient Effort good  -EN      Symptoms Noted During/After Treatment dizziness  -EN       Row Name 05/21/25 1320          General Information    Patient Profile Reviewed yes  s/p L TKA  -EN     Prior Level of Function independent:;all household mobility;ADL's;work  -EN     Existing Precautions/Restrictions no known precautions/restrictions  -EN     Barriers to Rehab none identified  -EN       Row Name 05/21/25 1320          Living Environment    Current Living Arrangements home  -EN     People in Home alone  -EN       Row Name 05/21/25 1320          Home Main Entrance    Stair Railings, Main Entrance other (see comments)  ramp  -EN       Row Name 05/21/25 1320          Stairs Within Home, Primary    Stairs, Within Home, Primary 0  -EN       Row Name 05/21/25 1320          Cognition    Orientation Status (Cognition) other (see comments)  WFL  -EN               User Key  (r) = Recorded By, (t) = Taken By, (c) = Cosigned By      Initials Name Provider Type    EN Poly Bob OTR Occupational Therapist                     Mobility/ADL's       Row Name 05/21/25 1322          Bed Mobility    Bed Mobility supine-sit  -EN     Supine-Sit [place] (Bed Mobility) standby assist  -EN     Assistive Device (Bed Mobility) head of bed elevated;bed rails  -EN       Row Name 05/21/25 1322          Transfers    Transfers sit-stand transfer;toilet transfer  -EN       Row Name 05/21/25 1322          Sit-Stand Transfer    Sit-Stand [place] (Transfers) contact guard  -EN     Assistive Device (Sit-Stand Transfers) walker, front-wheeled  -EN       Row Name 05/21/25 1322          Toilet Transfer    Type (Toilet Transfer) stand pivot/stand step  -EN     [place] Level (Toilet Transfer) contact guard  -EN     Assistive Device (Toilet Transfer) commode, 3-in-1;walker, front-wheeled  -EN       Row Name 05/21/25 1322          Functional Mobility    Functional Mobility- Ind. Level contact guard assist  -EN     Functional Mobility- Device walker,  front-wheeled  -EN     Functional Mobility-Distance (Feet) 70  -EN     Patient was able to Ambulate yes  -EN       Row Name 05/21/25 1322          Activities of Daily Living    BADL Assessment/Intervention lower body dressing  -EN       Row Name 05/21/25 1322          Mobility    Extremity Weight-bearing Status left lower extremity  -EN     Left Lower Extremity (Weight-bearing Status) weight-bearing as tolerated (WBAT)  -EN       Row Name 05/21/25 1322          Lower Body Dressing Assessment/Training    Comment, (Lower Body Dressing) anticipate CGA  -EN               User Key  (r) = Recorded By, (t) = Taken By, (c) = Cosigned By      Initials Name Provider Type    EN Poly Bob OTMIA Occupational Therapist                   Obj/Interventions       Row Name 05/21/25 1324          Range of Motion Comprehensive    General Range of Motion bilateral upper extremity ROM WFL  -EN       Row Name 05/21/25 1324          Strength Comprehensive (MMT)    Comment, General Manual Muscle Testing (MMT) Assessment B UE strength WFL  -EN       Row Name 05/21/25 1324          Balance    Comment, Balance independent with sitting balance EOB, stood with FWW and CGA  -EN               User Key  (r) = Recorded By, (t) = Taken By, (c) = Cosigned By      Initials Name Provider Type    Poly Anna OTR Occupational Therapist                   Goals/Plan       Row Name 05/21/25 1330          Dressing Goal 1 (OT)    Activity/Device (Dressing Goal 1, OT) lower body dressing  LH AE as needed  -EN     [place]/Cues Needed (Dressing Goal 1, OT) supervision required  -EN     Time Frame (Dressing Goal 1, OT) 1 day  -EN     Progress/Outcome (Dressing Goal 1, OT) new goal  -EN               User Key  (r) = Recorded By, (t) = Taken By, (c) = Cosigned By      Initials Name Provider Type    Poly Anna OTMIA Occupational Therapist                   Clinical Impression       Row Name 05/21/25 1325          Pain  Assessment    Pretreatment Pain Rating --  moderate  -EN     Posttreatment Pain Rating --  3-4/10  -EN     Pain Location knee  -EN     Pain Side/Orientation left  -EN     Pain Management Interventions cold applied;exercise or physical activity utilized;nursing notified  RN in room and gave pain meds 30 minutes prior  -EN     Response to Pain Interventions activity level improved;functional ability improved;intervention effective per patient report  -EN       Row Name 05/21/25 9851          Plan of Care Review    Plan of Care Reviewed With patient  -EN     Outcome Evaluation OT evaluation completed. Patient performed supine to sit with SBA and sit to stand from EOB with CGA. Patient performed toilet transfer with CGA and toilet hygiene independently. Patient performed functional mobility with FWW and CGA X 70 ft. Anticipate LB ADLs with CGA at this time. OT will follow up in AM to ensure ADL independence. Patient plans to return home at discharge and has out patient P.T. scheduled.  -EN       Row Name 05/21/25 132          Therapy Assessment/Plan (OT)    Rehab Potential (OT) good  -EN     Criteria for Skilled Therapeutic Interventions Met (OT) yes;skilled treatment is necessary  -EN     Therapy Frequency (OT) other (see comments)  one additional visit  -EN     Predicted Duration of Therapy Intervention (OT) one day  -EN       Row Name 05/21/25 2047          Therapy Plan Review/Discharge Plan (OT)    Equipment Needs Upon Discharge (OT) walker, rolling;commode chair  has a ramp and shower chair  -EN     Anticipated Discharge Disposition (OT) home with outpatient therapy services  -EN       Row Name 05/21/25 1323          Positioning and Restraints    Pre-Treatment Position in bed  -EN     Post Treatment Position chair  -EN     In Chair reclined;call light within reach;encouraged to call for assist;legs elevated  ice L knee  -EN               User Key  (r) = Recorded By, (t) = Taken By, (c) = Cosigned By      Initials  Name Provider Type    Poly Anna OTR Occupational Therapist                   Outcome Measures       Row Name 05/21/25 1331          How much help from another is currently needed...    Putting on and taking off regular lower body clothing? 3  -EN     Bathing (including washing, rinsing, and drying) 3  -EN     Toileting (which includes using toilet bed pan or urinal) 3  -EN     Putting on and taking off regular upper body clothing 4  -EN     Taking care of personal grooming (such as brushing teeth) 4  -EN     Eating meals 4  -EN     AM-PAC 6 Clicks Score (OT) 21  -EN       Row Name 05/21/25 1331          Functional Assessment    Outcome Measure Options AM-PAC 6 Clicks Daily Activity (OT)  -EN               User Key  (r) = Recorded By, (t) = Taken By, (c) = Cosigned By      Initials Name Provider Type    Poly Anna OTR Occupational Therapist                    Occupational Therapy Education       Title: PT OT SLP Therapies (Done)       Topic: Occupational Therapy (Done)       Point: ADL training (Done)       Learning Progress Summary            Patient Acceptance, E, VU by EN at 5/21/2025 1331    Comment: Patient educated on safety during transfers/mobility.                                      User Key       Initials Effective Dates Name Provider Type Discipline    EN 06/16/21 -  Poly Bob OTR Occupational Therapist OT                  OT Recommendation and Plan  Therapy Frequency (OT): other (see comments) (one additional visit)  Plan of Care Review  Plan of Care Reviewed With: patient  Outcome Evaluation: OT evaluation completed. Patient performed supine to sit with SBA and sit to stand from EOB with CGA. Patient performed toilet transfer with CGA and toilet hygiene independently. Patient performed functional mobility with FWW and CGA X 70 ft. Anticipate LB ADLs with CGA at this time. OT will follow up in AM to ensure ADL independence. Patient plans to return home at  discharge and has out patient P.T. scheduled.     Time Calculation:   Evaluation Complexity (OT)  Review Occupational Profile/Medical/Therapy History Complexity: brief/low complexity  Assessment, Occupational Performance/Identification of Deficit Complexity: 1-3 performance deficits  Clinical Decision Making Complexity (OT): problem focused assessment/low complexity  Overall Complexity of Evaluation (OT): low complexity     Time Calculation- OT       Row Name 05/21/25 1333             Time Calculation- OT    OT Start Time 1300  -EN      OT Stop Time 1319  -EN      OT Time Calculation (min) 19 min  -EN                User Key  (r) = Recorded By, (t) = Taken By, (c) = Cosigned By      Initials Name Provider Type    EN Poly Bob OTR Occupational Therapist                  Therapy Charges for Today       Code Description Service Date Service Provider Modifiers Qty    [id number]  OT EVAL LOW COMPLEXITY 1 5/21/2025 Poly Bob OTR GO 1                 PREMA Cardozo  5/21/2025

## 2025-05-21 NOTE — THERAPY EVALUATION
Patient Name: Fanny Winchester  : 1965    MRN: [id number]                              Today's Date: 2025       Admit Date: 2025    Visit Dx:     ICD-10-CM ICD-9-CM   1. S/P TKR (total knee replacement), left  Z96.652 V43.65   2. Primary osteoarthritis of left knee  M17.12 715.16     Patient Active Problem List   Diagnosis    Gastroesophageal reflux disease with esophagitis without hemorrhage    Fibromyalgia    Rheumatoid arthritis    DDD (degenerative disc disease), lumbar    Primary osteoarthritis of both knees    Elevated fasting glucose    Mixed hyperlipidemia    LGSIL Pap smear of vagina    Primary osteoarthritis of left knee    OA (osteoarthritis) of knee    S/P TKR (total knee replacement), left     Past Medical History:   Diagnosis Date    Anxiety     Mason tumor 2020    ovary    DDD (degenerative disc disease), lumbar 2022    Fibromyalgia     GERD (gastroesophageal reflux disease)     Osteoarthritis     Rheumatoid arthritis involving multiple sites      Past Surgical History:   Procedure Laterality Date    COLONOSCOPY N/A 2020    Procedure: COLONOSCOPY with polypectomy;  Surgeon: Adarsh Arredondo MD;  Location: [facility];  Service: Gastroenterology;  Laterality: N/A;  Rectal polyp    ENDOSCOPY N/A 2020    Procedure: ESOPHAGOGASTRODUODENOSCOPY with biopsies;  Surgeon: Adarsh Arredondo MD;  Location: [facility];  Service: Gastroenterology;  Laterality: N/A;  Reflux esophagitis  Gastritis  Duodenitis  Hiatal hernia  Biopsies: distal esophagus, gastric, duodenum    GANGLION CYST EXCISION Left 2019    HYSTERECTOMY  2000    OOPHORECTOMY Right 2020    TUMOR REMOVAL      ULNAR NERVE TRANSPOSITION Left       General Information       Row Name 25 1341          Physical Therapy Time and Intention    Document Type evaluation  -BP     Mode of Treatment physical therapy  -BP       Row Name 25 1341          General Information    Patient  Profile Reviewed yes  Patient admitted s/p L TKA.  -BP     Prior Level of Function --  Patient reports independence with mobility and ADLs at baseline without a device. Works full time at a pre school in the infant room.  -BP     Existing Precautions/Restrictions no known precautions/restrictions  -BP     Barriers to Rehab none identified  -BP       Row Name 05/21/25 1341          Living Environment    Current Living Arrangements home  -BP     People in Home alone  -BP       Row Name 05/21/25 1341          Home Main Entrance    Number of Stairs, Main Entrance --  ramp to enter  -BP     Stair Railings, Main Entrance none  -BP       Row Name 05/21/25 1341          Cognition    Orientation Status (Cognition) --  WFL  -BP       Row Name 05/21/25 1341          Safety Issues/Impairments Affecting Functional Mobility    Comment, Safety Issues/Impairments (Mobility) WFL  -BP               User Key  (r) = Recorded By, (t) = Taken By, (c) = Cosigned By      Initials Name Provider Type    BP Avtar Barbosa, PT Physical Therapist                   Mobility       Row Name 05/21/25 1342          Bed Mobility    Bed Mobility supine-sit  -BP     Supine-Sit [place] (Bed Mobility) standby assist  -BP     Assistive Device (Bed Mobility) head of bed elevated;bed rails  -BP       Row Name 05/21/25 1342          Sit-Stand Transfer    Sit-Stand [place] (Transfers) contact guard  -BP     Assistive Device (Sit-Stand Transfers) walker, front-wheeled  -BP       Row Name 05/21/25 1342          Gait/Stairs (Locomotion)    [place] Level (Gait) contact guard;verbal cues  -BP     Assistive Device (Gait) walker, front-wheeled  -BP     Patient was able to Ambulate yes  -BP     Distance in Feet (Gait) 70  -BP     Deviations/Abnormal Patterns (Gait) iggy decreased;stride length decreased;antalgic  -BP     Bilateral Gait Deviations forward flexed posture  -BP     Comment, (Gait/Stairs) Patient manages device safely, no loss of  balance or knee buckling. step through gait pattern noted  -       Row Name 05/21/25 1342          Mobility    Left Lower Extremity (Weight-bearing Status) weight-bearing as tolerated (WBAT)  -               User Key  (r) = Recorded By, (t) = Taken By, (c) = Cosigned By      Initials Name Provider Type    Avtar Corley PT Physical Therapist                   Obj/Interventions       Row Name 05/21/25 1343          Range of Motion Comprehensive    Comment, General Range of Motion R LE AROM WFL. L ankle and hip AROM WFL  -[facility] Name 05/21/25 1343          Strength Comprehensive (MMT)    Comment, General Manual Muscle Testing (MMT) Assessment R LE strength functional. L LE strength not formerly assessed, able to perform LAQ  -[facility] Name 05/21/25 1343          Balance    Comment, Balance Sitting balance-independent. Standing balance-CGA with device  -[facility] Name 05/21/25 1343          Sensory Assessment (Somatosensory)    Sensory Assessment (Somatosensory) sensation intact  -               User Key  (r) = Recorded By, (t) = Taken By, (c) = Cosigned By      Initials Name Provider Type    Avtar Corley PT Physical Therapist                   Goals/Plan       [facility] Name 05/21/25 1347          Bed Mobility Goal 1 (PT)    Activity/Assistive Device (Bed Mobility Goal 1, PT) bed mobility activities, all  -BP     [place] Level/Cues Needed (Bed Mobility Goal 1, PT) supervision required  -BP     Time Frame (Bed Mobility Goal 1, PT) 2 days  -BP     Progress/Outcomes (Bed Mobility Goal 1, PT) new goal  -[facility] Name 05/21/25 1347          Transfer Goal 1 (PT)    Activity/Assistive Device (Transfer Goal 1, PT) transfers, all;walker, rolling  -BP     [place] Level/Cues Needed (Transfer Goal 1, PT) supervision required  -BP     Time Frame (Transfer Goal 1, PT) 2 days  -BP     Progress/Outcome (Transfer Goal 1, PT) new goal  -[facility] Name 05/21/25 1347          Gait Training  Goal 1 (PT)    Activity/Assistive Device (Gait Training Goal 1, PT) gait (walking locomotion);walker, rolling  -BP     [place] Level (Gait Training Goal 1, PT) supervision required  -BP     Distance (Gait Training Goal 1, PT) 150  -BP     Time Frame (Gait Training Goal 1, PT) 2 days  -BP     Progress/Outcome (Gait Training Goal 1, PT) new goal  -BP       Row Name 05/21/25 1346          Therapy Assessment/Plan (PT)    Planned Therapy Interventions (PT) bed mobility training;gait training;home exercise program;patient/family education;strengthening;transfer training;ROM (range of motion);stretching  -BP               User Key  (r) = Recorded By, (t) = Taken By, (c) = Cosigned By      Initials Name Provider Type    BP Avtar Barbosa, PT Physical Therapist                   Clinical Impression       Row Name 05/21/25 1348          Pain    Pretreatment Pain Rating --  moderate  -BP     Posttreatment Pain Rating --  3-4/10  -BP     Pain Location knee  -BP     Pain Side/Orientation left  -BP     Pain Management Interventions exercise or physical activity utilized;cold applied;premedicated for activity  -BP     Response to Pain Interventions activity participation with decreased pain  -BP       Row Name 05/21/25 1345          Plan of Care Review    Plan of Care Reviewed With patient  -BP     Outcome Evaluation PT Evaluation Complete: Patient performs supine to sit transfer with SBA, sit to/from stand transfers with CGA, and gait x 70 feet with CGA with use of FWW. Patient manages device safely, demonstrates swing through gait without loss of balance. Patient with improved pain following gait training. Patient would benefit from physical therapy to address deficits in functional mobility and LE strength/ROM. Plan to see patient 1-2 additional visits. Patient plans to follow up with outpatient PT at discharge.  -BP       Row Name 05/21/25 1346          Therapy Assessment/Plan (PT)    Rehab Potential (PT) good  -BP      Criteria for Skilled Interventions Met (PT) yes;meets criteria  -BP     Therapy Frequency (PT) other (see comments)  1-2 visits  -BP       Row Name 05/21/25 1344          Positioning and Restraints    Pre-Treatment Position in bed  -BP     Post Treatment Position chair  -BP     In Chair reclined;call light within reach;encouraged to call for assist  -BP               User Key  (r) = Recorded By, (t) = Taken By, (c) = Cosigned By      Initials Name Provider Type    Avtar Corley, PT Physical Therapist                   Outcome Measures       Row Name 05/21/25 1354          How much help from another person do you currently need...    Turning from your back to your side while in flat bed without using bedrails? 3  -BP     Moving from lying on back to sitting on the side of a flat bed without bedrails? 3  -BP     Moving to and from a bed to a chair (including a wheelchair)? 3  -BP     Standing up from a chair using your arms (e.g., wheelchair, bedside chair)? 3  -BP     Climbing 3-5 steps with a railing? 2  -BP     To walk in hospital room? 3  -BP     AM-PAC 6 Clicks Score (PT) 17  -BP     Highest Level of Mobility Goal Stand (1 or More Minutes)-5  -BP       Row Name 05/21/25 1354 05/21/25 1331       Functional Assessment    Outcome Measure Options AM-PAC 6 Clicks Basic Mobility (PT)  -BP AM-PAC 6 Clicks Daily Activity (OT)  -EN              User Key  (r) = Recorded By, (t) = Taken By, (c) = Cosigned By      Initials Name Provider Type    Poly Anna OTR Occupational Therapist    Avtar Corley, PT Physical Therapist                                 Physical Therapy Education       Title: PT OT SLP Therapies (In Progress)       Topic: Physical Therapy (In Progress)       Point: Mobility training (Done)       Learning Progress Summary            Patient Acceptance, E,TB, VU by  at 5/21/2025 1354                      Point: Home exercise program (Not Started)       Learner Progress:  Not  documented in this visit.                              User Key       Initials Effective Dates Name Provider Type Discipline    BP 06/16/21 -  Avtar Barbosa, PT Physical Therapist PT                  PT Recommendation and Plan  Planned Therapy Interventions (PT): bed mobility training, gait training, home exercise program, patient/family education, strengthening, transfer training, ROM (range of motion), stretching  Outcome Evaluation: PT Evaluation Complete: Patient performs supine to sit transfer with SBA, sit to/from stand transfers with CGA, and gait x 70 feet with CGA with use of FWW. Patient manages device safely, demonstrates swing through gait without loss of balance. Patient with improved pain following gait training. Patient would benefit from physical therapy to address deficits in functional mobility and LE strength/ROM. Plan to see patient 1-2 additional visits. Patient plans to follow up with outpatient PT at discharge.     Time Calculation:   PT Evaluation Complexity  History, PT Evaluation Complexity: 1-2 personal factors and/or comorbidities  Examination of Body Systems (PT Eval Complexity): 1-2 elements  Clinical Presentation (PT Evaluation Complexity): stable  Clinical Decision Making (PT Evaluation Complexity): low complexity  Overall Complexity (PT Evaluation Complexity): low complexity     PT Charges       Row Name 05/21/25 1355             Time Calculation    Start Time 1300  -BP      Stop Time 1319  -BP      Time Calculation (min) 19 min  -BP      PT Received On 05/21/25  -BP      PT - Next Appointment 05/22/25  -BP                User Key  (r) = Recorded By, (t) = Taken By, (c) = Cosigned By      Initials Name Provider Type    BP Avtar Barbosa, PT Physical Therapist                  Therapy Charges for Today       Code Description Service Date Service Provider Modifiers Qty    [id number]  PT EVAL LOW COMPLEXITY 1 5/21/2025 Avtar Barbosa, PT GP 1            PT G-Codes  Outcome  Measure Options: AM-PAC 6 Clicks Basic Mobility (PT)  AM-PAC 6 Clicks Score (PT): 17  AM-PAC 6 Clicks Score (OT): 21  PT Discharge Summary  Anticipated Discharge Disposition (PT): home with outpatient therapy services    Avtar Barbosa, PT  5/21/2025

## 2025-05-21 NOTE — ANESTHESIA PROCEDURE NOTES
Peripheral Block    Pre-sedation assessment completed: 5/21/2025 7:45 AM    Patient reassessed immediately prior to procedure    Patient location during procedure: pre-op  Start time: 5/21/2025 7:51 AM  Stop time: 5/21/2025 7:52 AM  Reason for block: procedure for pain, at surgeon's request, post-op pain management and secondary anesthetic  Performed by  CRNA/CAA: Ce Giordano, CRNA  Preanesthetic Checklist  Completed: patient identified, IV checked, site marked, risks and benefits discussed, surgical consent, monitors and equipment checked, pre-op evaluation and timeout performed  Prep:  Pt Position: supine  Sterile barriers:cap, gloves, mask and washed/disinfected hands  Prep: ChloraPrep  Patient monitoring: blood pressure monitoring, continuous pulse oximetry and EKG  Procedure    Sedation: yes  Performed under: local infiltration  Guidance:ultrasound guided    ULTRASOUND INTERPRETATION.  Using ultrasound guidance a 21 G gauge needle was placed in close proximity to the nerve, at which point, under ultrasound guidance anesthetic was injected in the area of the nerve and spread of the anesthesia was seen on ultrasound in close proximity thereto.  There were no abnormalities seen on ultrasound; a digital image was taken; and the patient tolerated the procedure with no complications. Images:still images obtained, printed/placed on chart    Laterality:left  Block Type:iPack  Injection Technique:single-shot  Needle Type:echogenic  Needle Gauge:21 G  Resistance on Injection: none    Medications Used: dexmedetomidine HCl (PRECEDEX) injection - Perineural   10 mcg - 5/21/2025 7:52:00 AM  bupivacaine PF (MARCAINE) injection 0.25% - Peripheral Nerve   15 mL - 5/21/2025 7:52:00 AM  lidocaine PF (XYLOCAINE) injection 1% - Infiltration   30 mg - 5/21/2025 7:51:00 AM      Post Assessment  Injection Assessment: negative aspiration for heme, no paresthesia on injection and incremental injection  Patient  Tolerance:comfortable throughout block  Complications:no  Additional Notes  -heme  -paresthesia  Performed by: Ce Giordano CRNA

## 2025-05-21 NOTE — PLAN OF CARE
Goal Outcome Evaluation:  Plan of Care Reviewed With: patient           Outcome Evaluation: PT Evaluation Complete: Patient performs supine to sit transfer with SBA, sit to/from stand transfers with CGA, and gait x 70 feet with CGA with use of FWW. Patient manages device safely, demonstrates swing through gait without loss of balance. Patient with improved pain following gait training. Patient would benefit from physical therapy to address deficits in functional mobility and LE strength/ROM. Plan to see patient 1-2 additional visits. Patient plans to follow up with outpatient PT at discharge.    Anticipated Discharge Disposition (PT): home with outpatient therapy services

## 2025-05-21 NOTE — INTERVAL H&P NOTE
H&P reviewed. The patient was examined and there are no changes to the H&P.  There were no vitals taken for this visit.

## 2025-05-21 NOTE — ANESTHESIA POSTPROCEDURE EVALUATION
Patient: Fanny Winchester    Procedure Summary       Date: 05/21/25 Room / Location:  [facility] 2 /  [facility]    Anesthesia Start: 0840 Anesthesia Stop: 1018    Procedure: TOTAL KNEE ARTHROPLASTY WITH CORI ROBOT (Left: Knee) Diagnosis:       Primary osteoarthritis of left knee      (Primary osteoarthritis of left knee [M17.12])    Surgeons: Isaiah Meadows MD Provider: Yolanda Haile MD    Anesthesia Type: MAC, regional, spinal ASA Status: 2            Anesthesia Type: MAC, regional, spinal    Vitals  Vitals Value Taken Time   /68 05/21/25 10:55   Temp 98.5 °F (36.9 °C) 05/21/25 10:30   Pulse 81 05/21/25 10:57   Resp 15 05/21/25 10:50   SpO2 97 % 05/21/25 10:57   Vitals shown include unfiled device data.        Post Anesthesia Care and Evaluation    Patient location during evaluation: bedside  Patient participation: complete - patient participated  Level of consciousness: awake and alert  Pain score: 0  Pain management: adequate    Airway patency: patent  Anesthetic complications: No anesthetic complications  PONV Status: none  Cardiovascular status: acceptable  Respiratory status: acceptable  Hydration status: acceptable  Post Neuraxial Block status: No signs or symptoms of PDPH

## 2025-05-21 NOTE — PLAN OF CARE
Goal Outcome Evaluation:  Plan of Care Reviewed With: patient           Outcome Evaluation: OT evaluation completed. Patient performed supine to sit with SBA and sit to stand from EOB with CGA. Patient performed toilet transfer with CGA and toilet hygiene independently. Patient performed functional mobility with FWW and CGA X 70 ft. Anticipate LB ADLs with CGA at this time. OT will follow up in AM to ensure ADL independence. Patient plans to return home at discharge and has out patient  P.T. scheduled.    Anticipated Discharge Disposition (OT): home with outpatient therapy services

## 2025-05-21 NOTE — ANESTHESIA PROCEDURE NOTES
Peripheral Block    Pre-sedation assessment completed: 5/21/2025 7:45 AM    Patient reassessed immediately prior to procedure    Patient location during procedure: pre-op  Start time: 5/21/2025 7:46 AM  Stop time: 5/21/2025 7:48 AM  Reason for block: procedure for pain, at surgeon's request, post-op pain management and secondary anesthetic  Performed by  CRNA/CAA: Ce Giordano CRNA  Preanesthetic Checklist  Completed: patient identified, IV checked, site marked, risks and benefits discussed, surgical consent, monitors and equipment checked, pre-op evaluation and timeout performed  Prep:  Pt Position: supine  Sterile barriers:cap, gloves, mask and washed/disinfected hands  Prep: ChloraPrep  Patient monitoring: blood pressure monitoring, continuous pulse oximetry and EKG  Procedure    Sedation: yes  Performed under: local infiltration  Guidance:ultrasound guided    ULTRASOUND INTERPRETATION.  Using ultrasound guidance a 21 G gauge needle was placed in close proximity to the nerve, at which point, under ultrasound guidance anesthetic was injected in the area of the nerve and spread of the anesthesia was seen on ultrasound in close proximity thereto.  There were no abnormalities seen on ultrasound; a digital image was taken; and the patient tolerated the procedure with no complications. Images:still images obtained, printed/placed on chart    Laterality:left  Block Type:adductor canal block  Injection Technique:single-shot  Needle Type:echogenic  Needle Gauge:21 G  Resistance on Injection: none    Medications Used: dexmedetomidine HCl (PRECEDEX) injection - Perineural   20 mcg - 5/21/2025 7:48:00 AM  bupivacaine PF (MARCAINE) injection 0.25% - Peripheral Nerve   12 mL - 5/21/2025 7:48:00 AM  lidocaine PF (XYLOCAINE) injection 1% - Infiltration   30 mg - 5/21/2025 7:46:00 AM      Post Assessment  Injection Assessment: negative aspiration for heme, no paresthesia on injection and incremental injection  Patient  Tolerance:comfortable throughout block  Complications:no  Additional Notes  -heme  -paresthesia  Performed by: Ce Giordano CRNA

## 2025-05-22 VITALS
BODY MASS INDEX: 31.89 KG/M2 | WEIGHT: 180 LBS | HEART RATE: 63 BPM | DIASTOLIC BLOOD PRESSURE: 69 MMHG | OXYGEN SATURATION: 96 % | TEMPERATURE: 96.8 F | HEIGHT: 63 IN | SYSTOLIC BLOOD PRESSURE: 138 MMHG | RESPIRATION RATE: 16 BRPM

## 2025-05-22 LAB
ANION GAP SERPL CALCULATED.3IONS-SCNC: 9.3 MMOL/L (ref 5–15)
BASOPHILS # BLD AUTO: 0.02 10*3/MM3 (ref 0–0.2)
BASOPHILS NFR BLD AUTO: 0.2 % (ref 0–1.5)
BUN SERPL-MCNC: 17 MG/DL (ref 8–23)
BUN/CREAT SERPL: 21.3 (ref 7–25)
CALCIUM SPEC-SCNC: 9 MG/DL (ref 8.6–10.5)
CHLORIDE SERPL-SCNC: 106 MMOL/L (ref 98–107)
CO2 SERPL-SCNC: 23.7 MMOL/L (ref 22–29)
CREAT SERPL-MCNC: 0.8 MG/DL (ref 0.57–1)
DEPRECATED RDW RBC AUTO: 37.5 FL (ref 37–54)
EGFRCR SERPLBLD CKD-EPI 2021: 83.9 ML/MIN/1.73
EOSINOPHIL # BLD AUTO: 0.01 10*3/MM3 (ref 0–0.4)
EOSINOPHIL NFR BLD AUTO: 0.1 % (ref 0.3–6.2)
ERYTHROCYTE [DISTWIDTH] IN BLOOD BY AUTOMATED COUNT: 12.5 % (ref 12.3–15.4)
GLUCOSE SERPL-MCNC: 129 MG/DL (ref 65–99)
HCT VFR BLD AUTO: 35.5 % (ref 34–46.6)
HGB BLD-MCNC: 11.4 G/DL (ref 12–15.9)
IMM GRANULOCYTES # BLD AUTO: 0.04 10*3/MM3 (ref 0–0.05)
IMM GRANULOCYTES NFR BLD AUTO: 0.4 % (ref 0–0.5)
LYMPHOCYTES # BLD AUTO: 1.41 10*3/MM3 (ref 0.7–3.1)
LYMPHOCYTES NFR BLD AUTO: 13.6 % (ref 19.6–45.3)
MCH RBC QN AUTO: 26.6 PG (ref 26.6–33)
MCHC RBC AUTO-ENTMCNC: 32.1 G/DL (ref 31.5–35.7)
MCV RBC AUTO: 82.9 FL (ref 79–97)
MONOCYTES # BLD AUTO: 0.85 10*3/MM3 (ref 0.1–0.9)
MONOCYTES NFR BLD AUTO: 8.2 % (ref 5–12)
NEUTROPHILS NFR BLD AUTO: 77.5 % (ref 42.7–76)
NEUTROPHILS NFR BLD AUTO: 8.07 10*3/MM3 (ref 1.7–7)
NRBC BLD AUTO-RTO: 0 /100 WBC (ref 0–0.2)
PLATELET # BLD AUTO: 283 10*3/MM3 (ref 140–450)
PMV BLD AUTO: 10.2 FL (ref 6–12)
POTASSIUM SERPL-SCNC: 4.4 MMOL/L (ref 3.5–5.2)
RBC # BLD AUTO: 4.28 10*6/MM3 (ref 3.77–5.28)
SODIUM SERPL-SCNC: 139 MMOL/L (ref 136–145)
WBC NRBC COR # BLD AUTO: 10.4 10*3/MM3 (ref 3.4–10.8)

## 2025-05-22 PROCEDURE — 80048 BASIC METABOLIC PNL TOTAL CA: CPT | Performed by: INTERNAL MEDICINE

## 2025-05-22 PROCEDURE — 97110 THERAPEUTIC EXERCISES: CPT

## 2025-05-22 PROCEDURE — 85025 COMPLETE CBC W/AUTO DIFF WBC: CPT | Performed by: INTERNAL MEDICINE

## 2025-05-22 PROCEDURE — G0378 HOSPITAL OBSERVATION PER HR: HCPCS

## 2025-05-22 PROCEDURE — 97116 GAIT TRAINING THERAPY: CPT

## 2025-05-22 PROCEDURE — 97535 SELF CARE MNGMENT TRAINING: CPT

## 2025-05-22 PROCEDURE — 25010000002 CEFAZOLIN PER 500 MG: Performed by: INTERNAL MEDICINE

## 2025-05-22 RX ADMIN — SODIUM CHLORIDE 2000 MG: 9 INJECTION, SOLUTION INTRAVENOUS at 00:57

## 2025-05-22 RX ADMIN — FAMOTIDINE 40 MG: 20 TABLET, FILM COATED ORAL at 08:49

## 2025-05-22 RX ADMIN — ASPIRIN 81 MG: 81 TABLET, COATED ORAL at 08:49

## 2025-05-22 RX ADMIN — ACETAMINOPHEN 1000 MG: 500 TABLET, FILM COATED ORAL at 08:49

## 2025-05-22 RX ADMIN — MELOXICAM 15 MG: 7.5 TABLET ORAL at 08:50

## 2025-05-22 RX ADMIN — OXYCODONE HYDROCHLORIDE 10 MG: 5 TABLET ORAL at 00:56

## 2025-05-22 RX ADMIN — OXYCODONE HYDROCHLORIDE 10 MG: 5 TABLET ORAL at 06:42

## 2025-05-22 NOTE — CASE MANAGEMENT/SOCIAL WORK
Case Management Discharge Note      Final Note: Plan home  OP PT    Provided Post Acute Provider List?: Refused  Refused Provider List Comment: pt declined    Selected Continued Care - Discharged on 5/22/2025 Admission date: 5/21/2025 - Discharge disposition: Home or Self Care      Destination    No services have been selected for the patient.                Durable Medical Equipment       Service Provider Services Address Phone Fax Patient Preferred    [facility] [address] [place] [id number] [phone] --              Dialysis/Infusion    No services have been selected for the patient.                Home Medical Care    No services have been selected for the patient.                Therapy    No services have been selected for the patient.                Community Resources    No services have been selected for the patient.                Community & DME    No services have been selected for the patient.                         Final Discharge Disposition Code: 01 - home or self-care

## 2025-05-22 NOTE — PROGRESS NOTES
[facility]     Progress Note    Patient Name: Fanny Winchester  : 1965  MRN: [id number]  Primary Care Physician:  Chris Perez MD  Date of admission: 2025    Subjective   Subjective     Chief Complaint: Status post left total knee arthroplasty    History of Present Illness  Patient Reports patient is postop day 1.  She is doing well she is alert and oriented.  Reports minimal pain well-controlled oral medication.  Has been ambulatory with assistance and is doing well.  Stable hemodynamically with a hemoglobin of 11.4.  Is no motor or sensory deficit left lower extremity    Review of Systems    Objective   Objective     Vitals:   Temp:  [97.4 °F (36.3 °C)-98.5 °F (36.9 °C)] 98 °F (36.7 °C)  Heart Rate:  [56-88] 77  Resp:  [12-18] 16  BP: ()/(46-83) 146/80  Flow (L/min) (Oxygen Therapy):  [2-4] 2    Physical Exam     Result Review    Result Review:  I have personally reviewed the results from the time of this admission to 2025 06:43 EDT and agree with these findings:  []  Laboratory list / accordion  []  Microbiology  []  Radiology  []  EKG/Telemetry   []  Cardiology/Vascular   []  Pathology  []  Old records  []  Other:  Most notable findings include: Hemoglobin 11.4 glucose is 129      Assessment & Plan   Assessment / Plan     Brief Patient Summary:  Fanny Winchester is a 59 y.o. female who postop day 1 from a left total knee arthroplasty and is doing extremely well.  Pain controlled oral medication.  Ambulated with assistance with no deficiencies.    Active Hospital Problems:  Active Hospital Problems    Diagnosis     **Primary osteoarthritis of left knee     S/P TKR (total knee replacement), left     OA (osteoarthritis) of knee      Plan:       VTE Prophylaxis:  Mechanical VTE prophylaxis orders are present.        CODE STATUS:       Disposition:  I expect patient to be discharged later this morning after morning physical therapy.  Home health has been arranged.  Will see in the  office in 2 weeks.  Answered all questions.    Tony Berg MD

## 2025-05-22 NOTE — DISCHARGE SUMMARY
Orthopedic Discharge Summary      Patient: Fanny Winchester      YOB: 1965    Medical Record Number: [id number]    Attending Physician: No att. providers found  Consulting Physician(s):   Date of Admission: 5/21/2025  6:04 AM  Date of Discharge: 5/22/2025    Active Hospital Problems    Diagnosis     **Primary osteoarthritis of left knee     S/P TKR (total knee replacement), left     OA (osteoarthritis) of knee       Status Post: [place] ARTHRP KNE CONDYLE&PLATU MEDIAL&LAT COMPARTMENTS [31999] (TOTAL KNEE ARTHROPLASTY WITH CORI ROBOT)      No Known Allergies    Current Medications:     Discharge Medications        New Medications        Instructions Start Date   aspirin 81 MG EC tablet   81 mg, Oral, 2 Times Daily      ondansetron 4 MG tablet  Commonly known as: Zofran   4 mg, Oral, Every 8 Hours PRN      oxyCODONE-acetaminophen 5-325 MG per tablet  Commonly known as: PERCOCET   1 tablet, Oral, Every 4 Hours PRN      Stool Softener/Laxative 50-8.6 MG per tablet  Generic drug: sennosides-docusate   2 tablets, Oral, 2 Times Daily             Continue These Medications        Instructions Start Date   meloxicam 15 MG tablet  Commonly known as: MOBIC   15 mg, Oral, Daily             Stop These Medications      HYDROcodone-acetaminophen 5-325 MG per tablet  Commonly known as: NORCO     hydroxychloroquine 200 MG tablet  Commonly known as: PLAQUENIL                  Past Medical History:   Diagnosis Date    Anxiety     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    DDD (degenerative disc disease), lumbar 03/28/2022    Fibromyalgia     GERD (gastroesophageal reflux disease)     Osteoarthritis     Rheumatoid arthritis involving multiple sites      Past Surgical History:   Procedure Laterality Date    COLONOSCOPY N/A 12/22/2020    Procedure: COLONOSCOPY with polypectomy;  Surgeon: Adarsh Arredondo MD;  Location: [facility];  Service: Gastroenterology;  Laterality: N/A;  Rectal polyp    ENDOSCOPY N/A 12/22/2020     "Procedure: ESOPHAGOGASTRODUODENOSCOPY with biopsies;  Surgeon: Adarsh Arredondo MD;  Location:  [facility];  Service: Gastroenterology;  Laterality: N/A;  Reflux esophagitis  Gastritis  Duodenitis  Hiatal hernia  Biopsies: distal esophagus, gastric, duodenum    GANGLION CYST EXCISION Left 2019    HYSTERECTOMY  2000    OOPHORECTOMY Right 11/2020    TOTAL KNEE ARTHROPLASTY Left 5/21/2025    Procedure: TOTAL KNEE ARTHROPLASTY WITH CORI ROBOT;  Surgeon: Isaiah Meadows MD;  Location:  [facility];  Service: Robotics - Ortho;  Laterality: Left;    TUMOR REMOVAL      ULNAR NERVE TRANSPOSITION Left 2019     Social History     Occupational History    Not on file   Tobacco Use    Smoking status: Never    Smokeless tobacco: Never   Vaping Use    Vaping status: Never Used   Substance and Sexual Activity    Alcohol use: Yes     Comment: occasional    Drug use: Never    Sexual activity: Not Currently      Social History     Social History Narrative    Not on file     Family History   Problem Relation Age of Onset    Heart disease Father     Hypertension Father     Hyperlipidemia Father     Heart disease Mother     Lupus Mother     Colon cancer Maternal Aunt     Colon polyps Maternal Aunt     Breast cancer Neg Hx     Ovarian cancer Neg Hx          Physical Exam: 59 y.o. female  General Appearance:    Alert, cooperative, in no acute distress                      Vitals:    05/21/25 2354 05/22/25 0426 05/22/25 0738 05/22/25 0900   BP: 127/74 146/80 138/69    BP Location: Right arm Right arm Right arm    Patient Position: Lying Lying Lying    Pulse: 59 77 63    Resp: 18 16 16    Temp: 98 °F (36.7 °C) 98 °F (36.7 °C) 96.8 °F (36 °C)    TempSrc: Oral Oral Oral    SpO2: 95% 97% 96%    Weight:    81.6 kg (180 lb)   Height:    160 cm (63\")            General: No acute distress  Pulmonary: Nonlabored breathing  Cardiovascular: Regular rate and rhythm  LLE  Sensation normal  2+ dorsalis pedis and posterior tibial pulse  5 out of 5 " ankle plantarflexion dorsiflexion inversion and eversion  Dressing clean dry and intact  No signs of DVT or compartment syndrome      Hospital Course:  59 y.o. female admitted to [facility] to services of Isaiah Meadows MD with Primary osteoarthritis of left knee [M17.12]  S/P TKR (total knee replacement), left [Z96.652]  OA (osteoarthritis) of knee [M17.9] on 5/21/2025 and underwent [place] ARTHRP KNE CONDYLE&PLATU MEDIAL&LAT COMPARTMENTS [08168] (TOTAL KNEE ARTHROPLASTY WITH CORI ROBOT)  Per Isaiah Meadows MD. Antibiotic and VTE prophylaxis were per SCIP protocols. Post-operatively the patient transferred to the post-operative floor where the patient underwent mobilization therapy that included active as well as passive ROM exercises. Opioids were titrated to achieve appropriate pain management to allow for participation in mobilization exercises. Vital signs are now stable. The incision is intact without signs or symptoms of infection. Operative extremity neurovascular status remains intact.   Appropriate education re: incision care, activity levels, medications, and follow up visits was completed and all questions were answered. The patient is now deemed stable for discharge to Home.      DIAGNOSTIC TESTS:     Admission on 05/21/2025, Discharged on 05/22/2025   Component Date Value Ref Range Status    Glucose 05/22/2025 129 (H)  65 - 99 mg/dL Final    BUN 05/22/2025 17  8 - 23 mg/dL Final    Creatinine 05/22/2025 0.80  0.57 - 1.00 mg/dL Final    Sodium 05/22/2025 139  136 - 145 mmol/L Final    Potassium 05/22/2025 4.4  3.5 - 5.2 mmol/L Final    Chloride 05/22/2025 106  98 - 107 mmol/L Final    CO2 05/22/2025 23.7  22.0 - 29.0 mmol/L Final    Calcium 05/22/2025 9.0  8.6 - 10.5 mg/dL Final    BUN/Creatinine Ratio 05/22/2025 21.3  7.0 - 25.0 Final    Anion Gap 05/22/2025 9.3  5.0 - 15.0 mmol/L Final    eGFR 05/22/2025 83.9  >60.0 mL/min/1.73 Final    WBC 05/22/2025 10.40  3.40 - 10.80 10*3/mm3 Final    RBC  05/22/2025 4.28  3.77 - 5.28 10*6/mm3 Final    Hemoglobin 05/22/2025 11.4 (L)  12.0 - 15.9 g/dL Final    Hematocrit 05/22/2025 35.5  34.0 - 46.6 % Final    MCV 05/22/2025 82.9  79.0 - 97.0 fL Final    MCH 05/22/2025 26.6  26.6 - 33.0 pg Final    MCHC 05/22/2025 32.1  31.5 - 35.7 g/dL Final    RDW 05/22/2025 12.5  12.3 - 15.4 % Final    RDW-SD 05/22/2025 37.5  37.0 - 54.0 fl Final    MPV 05/22/2025 10.2  6.0 - 12.0 fL Final    Platelets 05/22/2025 283  140 - 450 10*3/mm3 Final    Neutrophil % 05/22/2025 77.5 (H)  42.7 - 76.0 % Final    Lymphocyte % 05/22/2025 13.6 (L)  19.6 - 45.3 % Final    Monocyte % 05/22/2025 8.2  5.0 - 12.0 % Final    Eosinophil % 05/22/2025 0.1 (L)  0.3 - 6.2 % Final    Basophil % 05/22/2025 0.2  0.0 - 1.5 % Final    Immature Grans % 05/22/2025 0.4  0.0 - 0.5 % Final    Neutrophils, Absolute 05/22/2025 8.07 (H)  1.70 - 7.00 10*3/mm3 Final    Lymphocytes, Absolute 05/22/2025 1.41  0.70 - 3.10 10*3/mm3 Final    Monocytes, Absolute 05/22/2025 0.85  0.10 - 0.90 10*3/mm3 Final    Eosinophils, Absolute 05/22/2025 0.01  0.00 - 0.40 10*3/mm3 Final    Basophils, Absolute 05/22/2025 0.02  0.00 - 0.20 10*3/mm3 Final    Immature Grans, Absolute 05/22/2025 0.04  0.00 - 0.05 10*3/mm3 Final    nRBC 05/22/2025 0.0  0.0 - 0.2 /100 WBC Final       XR Knee 1 or 2 View Left  Addendum Date: 5/21/2025  Addendum: An orthopedic screw or screw fragment is noted within the medial tibial plateau. Electronically Signed: Bryn Flynn MD  5/21/2025 11:37 AM EDT  Workstation ID: [id number]    Result Date: 5/21/2025  Narrative: XR KNEE 1 OR 2 VW LEFT Date of Exam: 5/21/2025 10:30 AM EDT Indication: postop total knee Post-op Knee Arthoplasty Comparison: None available. FINDINGS: Postoperative changes are noted status post total knee arthroplasty. Near anatomic alignment is observed. There is no evidence for periimplant fracture. Surgical changes are seen within the surrounding soft tissues.     Impression: Postoperative  changes status post total knee arthroplasty. Near-anatomic alignment is noted. There is no evidence for periimplant fracture. Electronically Signed: Bryn Flynn MD  5/21/2025 11:04 AM EDT  Workstation ID: [id number]    Peripheral Block  Result Date: 5/21/2025  Narrative: Ce Giordano CRNA     5/21/2025  7:56 AM Peripheral Block Pre-sedation assessment completed: 5/21/2025 7:45 AM Patient reassessed immediately prior to procedure Patient location during procedure: pre-op Start time: 5/21/2025 7:51 AM Stop time: 5/21/2025 7:52 AM Reason for block: procedure for pain, at surgeon's request, post-op pain management and secondary anesthetic Performed by CRNA/CAA: Ce Giordano CRNA Preanesthetic Checklist Completed: patient identified, IV checked, site marked, risks and benefits discussed, surgical consent, monitors and equipment checked, pre-op evaluation and timeout performed Prep: Pt Position: supine Sterile barriers:cap, gloves, mask and washed/disinfected hands Prep: ChloraPrep Patient monitoring: blood pressure monitoring, continuous pulse oximetry and EKG Procedure Sedation: yes Performed under: local infiltration Guidance:ultrasound guided ULTRASOUND INTERPRETATION.  Using ultrasound guidance a 21 G gauge needle was placed in close proximity to the nerve, at which point, under ultrasound guidance anesthetic was injected in the area of the nerve and spread of the anesthesia was seen on ultrasound in close proximity thereto.  There were no abnormalities seen on ultrasound; a digital image was taken; and the patient tolerated the procedure with no complications. Images:still images obtained, printed/placed on chart Laterality:left Block Type:iPack Injection Technique:single-shot Needle Type:echogenic Needle Gauge:21 G Resistance on Injection: none Medications Used: dexmedetomidine HCl (PRECEDEX) injection - Perineural  10 mcg - 5/21/2025 7:52:00 AM bupivacaine PF (MARCAINE) injection 0.25% - Peripheral  Nerve  15 mL - 5/21/2025 7:52:00 AM lidocaine PF (XYLOCAINE) injection 1% - Infiltration  30 mg - 5/21/2025 7:51:00 AM Post Assessment Injection Assessment: negative aspiration for heme, no paresthesia on injection and incremental injection Patient Tolerance:comfortable throughout block Complications:no Additional Notes -heme -paresthesia Performed by: Ce Giordano CRNA     Peripheral Block  Result Date: 5/21/2025  Narrative: Ce Giordano CRNA     5/21/2025  7:55 AM Peripheral Block Pre-sedation assessment completed: 5/21/2025 7:45 AM Patient reassessed immediately prior to procedure Patient location during procedure: pre-op Start time: 5/21/2025 7:46 AM Stop time: 5/21/2025 7:48 AM Reason for block: procedure for pain, at surgeon's request, post-op pain management and secondary anesthetic Performed by CRNA/CAA: Ce Giordano CRNA Preanesthetic Checklist Completed: patient identified, IV checked, site marked, risks and benefits discussed, surgical consent, monitors and equipment checked, pre-op evaluation and timeout performed Prep: Pt Position: supine Sterile barriers:cap, gloves, mask and washed/disinfected hands Prep: ChloraPrep Patient monitoring: blood pressure monitoring, continuous pulse oximetry and EKG Procedure Sedation: yes Performed under: local infiltration Guidance:ultrasound guided ULTRASOUND INTERPRETATION.  Using ultrasound guidance a 21 G gauge needle was placed in close proximity to the nerve, at which point, under ultrasound guidance anesthetic was injected in the area of the nerve and spread of the anesthesia was seen on ultrasound in close proximity thereto.  There were no abnormalities seen on ultrasound; a digital image was taken; and the patient tolerated the procedure with no complications. Images:still images obtained, printed/placed on chart Laterality:left Block Type:adductor canal block Injection Technique:single-shot Needle Type:echogenic Needle Gauge:21 G Resistance  on Injection: none Medications Used: dexmedetomidine HCl (PRECEDEX) injection - Perineural  20 mcg - 5/21/2025 7:48:00 AM bupivacaine PF (MARCAINE) injection 0.25% - Peripheral Nerve  12 mL - 5/21/2025 7:48:00 AM lidocaine PF (XYLOCAINE) injection 1% - Infiltration  30 mg - 5/21/2025 7:46:00 AM Post Assessment Injection Assessment: negative aspiration for heme, no paresthesia on injection and incremental injection Patient Tolerance:comfortable throughout block Complications:no Additional Notes -heme -paresthesia Performed by: Ce Giordano CRNA     US Sonosite Portable  Result Date: 5/21/2025  Narrative: This procedure was auto-finalized with no dictation required.    Mammo Screening Digital Tomosynthesis Bilateral With CAD  Result Date: 5/15/2025  Narrative: DIGITAL SCREENING MAMMOGRAM WITH TOMOSYNTHESIS  HISTORY: Screening Mammography.   Low dose full field digital breast tomosynthesis imaging was performed with 2D and 3D acquisitions consisting of bilateral CC and MLO views. Examination is compared to prior examination dating back to 7/25/2018. Examination is read in conjunction with computer aided detection.   FINDINGS:   There are scattered areas of fibroglandular density. No suspicious masses, microcalcifications or  areas of architectural distortion are present.      Impression: Negative bilateral mammogram.  RECOMMENDATION:  Continue annual screening mammography.  BI-RADS CATEGORY 1, NEGATIVE.   CAD was utilized.  The standard false-negative rate of mammography is between 10% and 25%. Complex patterns or increased breast density will markedly elevate the false-negative rate of mammography.   A letter, in lay terminology, with the results of this exam will be mailed to the patient.   5/15/2025 7:55 AM by Dr. Francesco Alberto MD on Workstation: [email or web address]        Discharge and Follow up Instructions:   Follow-up with me in the office in 2 weeks    Total Knee Joint Replacement Discharge Instructions:    I.  ACTIVITIES:  1. Exercises:  Complete exercise program as taught by the hospital physical therapist 2 times per day  Exercise program will be advanced by the physical therapist  During the day be up ambulating every 2 hours (while awake) for short distances  Complete the ankle pump exercises at least 10 times per hour (while awake)  Elevate legs most of the day the first week post operatively and thereafter elevate legs when in bed and for at least 30 minutes during the day. Caution must be taken to avoid pillow placement under the bend of the knee as this can led to flexion contractures of the knee.  Use cold packs 20-30 minutes approximately 5 times per day. This should be done before and after completing your exercises and at any time you are experiencing pain/ stiffness in your operative extremity.      2. Activities of Daily Living:  No tub baths, hot tubs, or swimming pools for 4 weeks  May shower and let water run over the incision on post-operative day #5 if no drainage. Do not scrub or rub the incision. Simply let the water run over the incision and pat dry.    II. Restrictions  Do not cross legs or kneel  Your surgeon will discuss with you when you will be able to drive again.  Weight bearing is as tolerated  First week stay inside on even terrain. May go up and down stairs one stair at a time utilizing the hand rail  After one week, you may venture outside.    III. Precautions:  Everyone that comes near you should wash their hands  No elective dental, genital-urinary, or colon procedures or surgical procedures for 12 weeks after surgery unless absolutely necessary.   If dental work or surgical procedure is deemed absolutely necessary, you will need to contact your surgeon as you will need to take antibiotics 1 hour prior to any dental work (including teeth cleanings).  Please discuss with your surgeon prophylactic antibiotics as the length of time this intervention will be necessary for you varies with  each patient’s health history and situation.  Avoid sick people. If you must be around someone who is ill, they should wear a mask.  Avoid visits to the Emergency Room or Urgent Care unless you are having a life threatening event.   If ordered stockings are to be placed on in the morning and removed at night. Monitor the stockings to ensure that any swelling is not causing the stockings to become too tight. In this case, remove stockings immediately.    IV. INCISION CARE:  May remove the Ace wrap 2 days following surgery  The negative pressure dressing with the battery pack is waterproof and should remain in place for 7 days.  You may shower with the negative pressure dressing as it is waterproof  Following removal of the dressing on day 7 you may shower and allow water to run over the incision  No submersion of incision in water such as tubs pools hot tubs etc.  No creams or ointments to the incision  Do not touch or pick at the incision  Check incision/dressing every day and notify surgeon immediately if any of the following signs or symptoms are noted:  Increase in redness  Increase in swelling around the incision and of the entire extremity  Increase in pain  Drainage oozing from the incision  Pulling apart of the edges of the incision  Increase in overall body temperature (greater than 100.5 degrees)  Your surgeon will instruct you regarding suture or staple removal    V. Medications:   1. Anticoagulants: You will be discharged on an anticoagulant. This is a prophylactic medication that helps prevent blood clots during your post-operative period. The type and length of dosage varies based on your individual needs, procedure performed, and surgeon’s preference.  While taking the anticoagulant, you should avoid taking any additional aspirin, ibuprofen (Advil or Motrin), Aleve (Naprosyn) or other non-steroidal anti-inflammatory medications.   Notify surgeon immediately if any bo bleeding is noted in the urine,  stool, emesis, or from the nose or the incision. Blood in the stool will often appear as black rather than red. Blood in urine may appear as pink. Blood in emesis may appear as brown/black like coffee grounds.  You will need to apply pressure for longer periods of time to any cuts or abrasions to stop bleeding  Avoid alcohol while taking anticoagulants    2. Stool Softeners: You will be at greater risk of constipation after surgery due to being less mobile and the pain medications.   Take stool softeners as instructed by your surgeon while on pain medications. Over the counter Colace 100 mg 1-2 capsules twice daily.   If stools become too loose or too frequent, please decreases the dosage or stop the stool softener.  If constipation occurs despite use of stool softeners, you are to continue the stool softeners and add a laxative (Milk of Magnesia 1 ounce daily as needed)  Drink plenty of fluids, and eat fruits and vegetables during your recovery time    3. Pain Medications utilized after surgery are narcotics and the law requires that the following information be given to all patients that are prescribed narcotics:  CLASSIFICATION: Pain medications are called Opioids and are narcotics  LEGALITIES: It is illegal to share narcotics with others and to drive within 24 hours of taking narcotics  POTENTIAL SIDE EFFECTS: Potential side effects of opioids include: nausea, vomiting, itching, dizziness, drowsiness, dry mouth, constipation, and difficulty urinating.  POTENTIAL ADVERSE EFFECTS:   Opioid tolerance can develop with use of pain medications and this simply means that it requires more and more of the medication to control pain; however, this is seen more in patients that use opioids for longer periods of time.  Opioid dependence can develop with use of Opioids and this simply means that to stop the medication can cause withdrawal symptoms; however, this is seen with patients that use Opioids for longer periods of  time.  Opioid addiction can develop with use of Opioids and the incidence of this is very unlikely in patients who take the medications as ordered and stop the medications as instructed.  Opioid overdose can be dangerous, but is unlikely when the medication is taken as ordered and stopped when ordered. It is important not to mix opioids with alcohol or with and type of sedative such as Benadryl as this can lead to over sedation and respiratory difficulty.  DOSAGE:   Pain medications will need to be taken consistently for the first week to decrease pain and promote adequate pain relief and participation in physical therapy.  After the initial surgical pain begins to resolve, you may begin to decrease the pain medication. By the end of 6 weeks, you should be off of pain medications.  Refills will not be given by the office during evening hours, on weekends, or after 6 weeks post-op.  To seek refills on pain medications during the initial 6 week post-operative period, you must call the office 48 hours in advance to request the refill. The office will then notify you when to  the prescription. DO NOT wait until you are out of the medication to request a refill.    V. FOLLOW-UP VISITS:  You will need to follow up in the office with your surgeon in 2 weeks. Please call this number [phone] to schedule this appointment.  If you have any concerns or suspected complications prior to your follow up visit, please call your surgeons office. Do not wait until your appointment time if you suspect complications. These will need to be addressed in the office promptly.        Date: 5/22/2025    Isaiah Meadows MD

## 2025-05-22 NOTE — PLAN OF CARE
Goal Outcome Evaluation:              Outcome Evaluation: PT: Patient performs sit to stand with SBA and gait with rolling walker x150 feet, SBA.  Patient performs 10 reps of LE HEP, written handout issued.  Patient reports no concerns regarding return home at this time.  Recommend outpatient PT services at discharge.  No further PT needs in acute care setting.    Anticipated Discharge Disposition (PT): home with outpatient therapy services

## 2025-05-22 NOTE — PLAN OF CARE
Goal Outcome Evaluation:  Plan of Care Reviewed With: patient           Outcome Evaluation: POD #1 LTKA, pain controlled with po pain medication, ambulated in the hallway with stand by assist and walker, voided, tolerating diet, dressing clean, dry and intact, ice to left knee, posible discharge home today.

## 2025-05-22 NOTE — THERAPY DISCHARGE NOTE
Acute Care - Occupational Therapy Treatment Note/Discharge   Jason     Patient Name: Fanny Winchester  : 1965  MRN: [id number]  Today's Date: 2025               Admit Date: 2025       ICD-10-CM ICD-9-CM   1. S/P TKR (total knee replacement), left  Z96.652 V43.65   2. Primary osteoarthritis of left knee  M17.12 715.16     Patient Active Problem List   Diagnosis    Gastroesophageal reflux disease with esophagitis without hemorrhage    Fibromyalgia    Rheumatoid arthritis    DDD (degenerative disc disease), lumbar    Primary osteoarthritis of both knees    Elevated fasting glucose    Mixed hyperlipidemia    LGSIL Pap smear of vagina    Primary osteoarthritis of left knee    OA (osteoarthritis) of knee    S/P TKR (total knee replacement), left     Past Medical History:   Diagnosis Date    Anxiety     Mason tumor 2020    ovary    DDD (degenerative disc disease), lumbar 2022    Fibromyalgia     GERD (gastroesophageal reflux disease)     Osteoarthritis     Rheumatoid arthritis involving multiple sites      Past Surgical History:   Procedure Laterality Date    COLONOSCOPY N/A 2020    Procedure: COLONOSCOPY with polypectomy;  Surgeon: Adarsh Arredondo MD;  Location: [facility];  Service: Gastroenterology;  Laterality: N/A;  Rectal polyp    ENDOSCOPY N/A 2020    Procedure: ESOPHAGOGASTRODUODENOSCOPY with biopsies;  Surgeon: Adarsh Arredondo MD;  Location: [facility] OR;  Service: Gastroenterology;  Laterality: N/A;  Reflux esophagitis  Gastritis  Duodenitis  Hiatal hernia  Biopsies: distal esophagus, gastric, duodenum    GANGLION CYST EXCISION Left 2019    HYSTERECTOMY  2000    OOPHORECTOMY Right 2020    TOTAL KNEE ARTHROPLASTY Left 2025    Procedure: TOTAL KNEE ARTHROPLASTY WITH CORI ROBOT;  Surgeon: Isaiah Meadows MD;  Location: [facility];  Service: Robotics - Ortho;  Laterality: Left;    TUMOR REMOVAL      ULNAR NERVE TRANSPOSITION Left 2019       OT  ASSESSMENT FLOWSHEET (Last 12 Hours)       OT Evaluation and Treatment       Row Name 05/22/25 0822                   OT Time and Intention    Subjective Information no complaints  -EN        Document Type discharge treatment  -EN        Mode of Treatment occupational therapy  -EN        Patient Effort good  -EN        Symptoms Noted During/After Treatment none  -EN           General Information    Patient Profile Reviewed yes  -EN        Patient/Family/Caregiver Comments/Observations Patient reclined in chair, agreed to therapy.  -EN        Risks Reviewed patient:;increased discomfort  -EN        Benefits Reviewed patient:;increase independence  -EN        Barriers to Rehab none identified  -EN           Pain Assessment    Pre/Posttreatment Pain Comment pt did not c/o pain  -EN           Cognition    Personal Safety Interventions nonskid shoes/slippers when out of bed  -EN           Activities of Daily Living    BADL Assessment/Intervention lower body dressing  -EN        Comment, IADL Assessment/Training Patient educated on home safety. Patient reports she's already removed throw rugs, put in a ramp, has a walker with a basket, etc..  -EN        Additional Documentation Comment, IADL Assessment/Training (Row)  -EN           Lower Body Dressing Assessment/Training    [place] Level (Lower Body Dressing) lower body dressing skills;don;pants/bottoms;shoes/slippers;socks;independent  -EN           Sit-Stand Transfer    Sit-Stand [place] (Transfers) independent  -EN        Assistive Device (Sit-Stand Transfers) walker, front-wheeled  -EN           Wound 05/21/25 0943 Left anterior knee Surgical Closed Surgical Incision    Wound - Properties Group Placement Date: 05/21/25  -KD Placement Time: 0943 -KD Side: Left  -KD Orientation: anterior  -KD Location: knee  -KD Primary Wound Type: Surgical  -KD Secondary Wound Type - Surgical: Closed Surgi  -KD Additional Comments: glue, robby, ace, ice  -KD    Retired Wound  - Properties Group Placement Date: 05/21/25 -KD Placement Time: 0943 -KD Side: Left  -KD Orientation: anterior  -KD Location: knee  -KD Additional Comments: glue, robby, ace, ice  -KD    Retired Wound - Properties Group Placement Date: 05/21/25 -KD Placement Time: 0943 -KD Side: Left  -KD Orientation: anterior  -KD Location: knee  -KD Additional Comments: glue, robby, ace, ice  -KD    Retired Wound - Properties Group Date first assessed: 05/21/25 -KD Time first assessed: 0943 -KD Side: Left  -KD Location: knee  -KD Additional Comments: glue, robby, ace, ice  -KD       NPWT (Negative Pressure Wound Therapy) 05/21/25 0944 left knee    NPWT (Negative Pressure Wound Therapy) - Properties Group Placement Date: 05/21/25 -KD Placement Time: 0944 -KD Location: left knee  -KD Additional Comments: robby  -KD    Retired NPWT (Negative Pressure Wound Therapy) - Properties Group Placement Date: 05/21/25 -KD Placement Time: 0944 -KD Location: left knee  -KD Additional Comments: robby  -KD    Retired NPWT (Negative Pressure Wound Therapy) - Properties Group Placement Date: 05/21/25 -KD Placement Time: 0944 -KD Location: left knee  -KD Additional Comments: robby  -KD    Retired NPWT (Negative Pressure Wound Therapy) - Properties Group Placement Date: 05/21/25 -KD Placement Time: 0944 -KD Location: left knee  -KD Additional Comments: robby  -KD       Plan of Care Review    Plan of Care Reviewed With patient  -EN        Progress improving  -EN        Outcome Evaluation OT- Patient donned pants, socks and shoes independently. Patient stood from chair with FWW independently. Patient reports she has a ramp, BSC, FWW, and has no concerns regarding return home.  -EN           Positioning and Restraints    Pre-Treatment Position sitting in chair/recliner  -EN        Post Treatment Position chair  -EN        In Chair reclined;call light within reach  -EN           Progress Summary (OT)    Progress Toward Functional Goals (OT) prepare  for discharge  -EN           Dressing Goal 1 (OT)    Activity/Device (Dressing Goal 1, OT) lower body dressing  LH AE as needed  -EN        [place]/Cues Needed (Dressing Goal 1, OT) supervision required  -EN        Time Frame (Dressing Goal 1, OT) 1 day  -EN        Progress/Outcome (Dressing Goal 1, OT) goal met  -EN                  User Key  (r) = Recorded By, (t) = Taken By, (c) = Cosigned By      Initials Name Effective Dates    Ilda Purvis RN 04/10/20 -     Poly Anna OTR 06/16/21 -                     Occupational Therapy Education       Title: PT OT SLP Therapies (In Progress)       Topic: Occupational Therapy (Done)       Point: ADL training (Done)       Learning Progress Summary            Patient Acceptance, E, VU,DU by EN at 5/22/2025 0846    Acceptance, E, VU by ARABELLA at 5/21/2025 1331    Comment: Patient educated on safety during transfers/mobility.                                      User Key       Initials Effective Dates Name Provider Type Discipline    EN 06/16/21 -  Poly Bob OTR Occupational Therapist OT                    OT Recommendation and Plan  Therapy Frequency (OT): other (see comments) (one additional visit)  Progress Toward Functional Goals (OT): prepare for discharge  Plan of Care Review  Plan of Care Reviewed With: patient  Progress: improving  Outcome Evaluation: OT- Patient donned pants, socks and shoes independently. Patient stood from chair with FWW independently. Patient reports she has a ramp, BSC, FWW, and has no concerns regarding return home.        OT Rehab Goals       Row Name 05/22/25 0822             Dressing Goal 1 (OT)    Activity/Device (Dressing Goal 1, OT) lower body dressing  LH AE as needed  -EN      [place]/Cues Needed (Dressing Goal 1, OT) supervision required  -EN      Time Frame (Dressing Goal 1, OT) 1 day  -EN      Progress/Outcome (Dressing Goal 1, OT) goal met  -EN                User Key  (r) = Recorded By, (t) = Taken  By, (c) = Cosigned By      Initials Name Provider Type Discipline    EN Poly Bob OTR Occupational Therapist OT                     Outcome Measures       Row Name 05/22/25 0822             How much help from another is currently needed...    Putting on and taking off regular lower body clothing? 4  -EN      Bathing (including washing, rinsing, and drying) 4  -EN      Toileting (which includes using toilet bed pan or urinal) 4  -EN      Putting on and taking off regular upper body clothing 4  -EN      Taking care of personal grooming (such as brushing teeth) 4  -EN      Eating meals 4  -EN      AM-PAC 6 Clicks Score (OT) 24  -EN                User Key  (r) = Recorded By, (t) = Taken By, (c) = Cosigned By      Initials Name Provider Type    Poly Anna OTR Occupational Therapist                    Time Calculation:    Time Calculation- OT       Row Name 05/22/25 0848             Time Calculation- OT    OT Start Time 0822  -EN      OT Stop Time 0832  -EN      OT Time Calculation (min) 10 min  -EN         Timed Charges    26636 - OT Self Care/Mgmt Minutes 10  -EN         Total Minutes    Timed Charges Total Minutes 10  -EN       Total Minutes 10  -EN                User Key  (r) = Recorded By, (t) = Taken By, (c) = Cosigned By      Initials Name Provider Type    Poly Anna OTR Occupational Therapist                  Timed Therapy Charges  Total Units: 1      Suggested Charges  Total Units: 1      Procedure Name Documented Minutes Units Code    HC OT SELF CARE/MGMT/TRAIN EA 15 MIN 10 1   61589 (CPT®)                 Documented Minutes  Total Minutes: 10      Therapy Provided Minutes    43005 - OT Self Care/Mgmt Minutes 10                  Therapy Charges for Today       Code Description Service Date Service Provider Modifiers Qty    13929727754 HC OT EVAL LOW COMPLEXITY 1 5/21/2025 Poly Bob OTR GO 1    86392177824 HC OT SELF CARE/MGMT/TRAIN EA 15 MIN 5/22/2025  Poly Bob, OTR GO 1                 OT Discharge Summary  Anticipated Discharge Disposition (OT): home with outpatient therapy services    Poly Bob, OTR  5/22/2025

## 2025-05-22 NOTE — DISCHARGE INSTR - APPOINTMENTS
Patient has follow up with Dr. Perez on June 2 @ 1 pm [phone].  Please bring ID and Insurance Cards to appointment

## 2025-05-22 NOTE — NURSING NOTE
Patient pod #1 for left total knee. Pain controlled by PO pain medications. Anticipate home this morning, daughter at bedside to transport.

## 2025-05-22 NOTE — PROGRESS NOTES
Patient: Fanny Winchester  Procedure(s):  TOTAL KNEE ARTHROPLASTY WITH CORI ROBOT  Anesthesia type: MAC, regional, spinal    Patient location: [facility] Surgical Floor  Last vitals:   Vitals:    05/22/25 0738   BP: 138/69   Pulse: 63   Resp: 16   Temp: 96.8 °F (36 °C)   SpO2: 96%     Level of consciousness: awake, alert, and oriented    Post-anesthesia pain: adequate analgesia  Airway patency: patent  Respiratory: unassisted  Cardiovascular: stable and blood pressure at baseline  Hydration: euvolemic    Anesthetic complications: no

## 2025-05-22 NOTE — PLAN OF CARE
Goal Outcome Evaluation:  Plan of Care Reviewed With: patient        Progress: improving  Outcome Evaluation: OT- Patient donned pants, socks and shoes independently. Patient stood from chair with FWW independently. Patient reports she has a ramp, BSC, FWW, and has no concerns regarding return home.    Anticipated Discharge Disposition (OT): home with outpatient therapy services

## 2025-05-22 NOTE — THERAPY DISCHARGE NOTE
2 year vaccines were given per provider today - patient tolerated well   Acute Care - Physical Therapy Treatment Note/Discharge   Jason     Patient Name: Fanny Winchester  : 1965  MRN: [id number]  Today's Date: 2025                Admit Date: 2025    Visit Dx:    ICD-10-CM ICD-9-CM   1. S/P TKR (total knee replacement), left  Z96.652 V43.65   2. Primary osteoarthritis of left knee  M17.12 715.16     Patient Active Problem List   Diagnosis    Gastroesophageal reflux disease with esophagitis without hemorrhage    Fibromyalgia    Rheumatoid arthritis    DDD (degenerative disc disease), lumbar    Primary osteoarthritis of both knees    Elevated fasting glucose    Mixed hyperlipidemia    LGSIL Pap smear of vagina    Primary osteoarthritis of left knee    OA (osteoarthritis) of knee    S/P TKR (total knee replacement), left     Past Medical History:   Diagnosis Date    Anxiety     Mason tumor 2020    ovary    DDD (degenerative disc disease), lumbar 2022    Fibromyalgia     GERD (gastroesophageal reflux disease)     Osteoarthritis     Rheumatoid arthritis involving multiple sites      Past Surgical History:   Procedure Laterality Date    COLONOSCOPY N/A 2020    Procedure: COLONOSCOPY with polypectomy;  Surgeon: Adarsh Arredondo MD;  Location: [facility];  Service: Gastroenterology;  Laterality: N/A;  Rectal polyp    ENDOSCOPY N/A 2020    Procedure: ESOPHAGOGASTRODUODENOSCOPY with biopsies;  Surgeon: Adarsh Arredondo MD;  Location: [facility] OR;  Service: Gastroenterology;  Laterality: N/A;  Reflux esophagitis  Gastritis  Duodenitis  Hiatal hernia  Biopsies: distal esophagus, gastric, duodenum    GANGLION CYST EXCISION Left     HYSTERECTOMY  2000    OOPHORECTOMY Right 2020    TOTAL KNEE ARTHROPLASTY Left 2025    Procedure: TOTAL KNEE ARTHROPLASTY WITH CORI ROBOT;  Surgeon: Isaiah Meadows MD;  Location: [facility];  Service: Robotics - Ortho;  Laterality: Left;    TUMOR REMOVAL      ULNAR NERVE TRANSPOSITION Left         PT Assessment (Last 12 Hours)       PT Evaluation and Treatment       Row Name 05/22/25 0913          Physical Therapy Time and Intention    Subjective Information complains of;pain  -     Document Type discharge treatment  -     Mode of Treatment physical therapy  -JW     Patient Effort good  -     Symptoms Noted During/After Treatment none  -       Row Name 05/22/25 0913          General Information    Patient Observations alert;cooperative;agree to therapy  -     Patient/Family/Caregiver Comments/Observations pt sitting in recliner, agrees to therapy  -     Risks Reviewed patient:;increased discomfort  -     Benefits Reviewed patient:;improve function;increase independence;increase strength;increase balance  -     Barriers to Rehab none identified  -       Row Name 05/22/25 0913          Pain    Pre/Posttreatment Pain Comment pt reports pain with mobility  -       Row Name 05/22/25 0913          Cognition    Personal Safety Interventions gait belt;nonskid shoes/slippers when out of bed  -       Row Name 05/22/25 0913          Bed Mobility    Comment, (Bed Mobility) deferred up in recliner  -       Row Name 05/22/25 0913          Transfers    Transfers sit-stand transfer;stand-sit transfer  -       Row Name 05/22/25 0913          Sit-Stand Transfer    Sit-Stand [place] (Transfers) standby assist  -     Assistive Device (Sit-Stand Transfers) walker, front-wheeled  -       Row Name 05/22/25 0913          Stand-Sit Transfer    Stand-Sit [place] (Transfers) standby assist  -     Assistive Device (Stand-Sit Transfers) walker, front-wheeled  -       Row Name 05/22/25 0913          Gait/Stairs (Locomotion)    [place] Level (Gait) standby assist  -     Assistive Device (Gait) walker, front-wheeled  -     Distance in Feet (Gait) 150  -     Deviations/Abnormal Patterns (Gait) iggy decreased  -     Bilateral Gait Deviations forward flexed posture  -      Comment, (Gait/Stairs) pt manages direction changes without balance loss  -JW       Row Name 05/22/25 0913          Balance    Comment, Balance SBA with walker  -NINFA       Row Name 05/22/25 0913          Motor Skills    Therapeutic Exercise --  pt performs 10 reps of LE HEP, written handout issued  -NINFA       Row Name             Wound 05/21/25 0943 Left anterior knee Surgical Closed Surgical Incision    Wound - Properties Group Placement Date: 05/21/25 -KD Placement Time: 0943 -KD Side: Left  -KD Orientation: anterior  -KD Location: knee  -KD Primary Wound Type: Surgical  -KD Secondary Wound Type - Surgical: Closed Surgi  -KD Additional Comments: glue, robby, ace, ice  -KD    Retired Wound - Properties Group Placement Date: 05/21/25 -KD Placement Time: 0943 -KD Side: Left  -KD Orientation: anterior  -KD Location: knee  -KD Additional Comments: glue, robby, ace, ice  -KD    Retired Wound - Properties Group Placement Date: 05/21/25 -KD Placement Time: 0943 -KD Side: Left  -KD Orientation: anterior  -KD Location: knee  -KD Additional Comments: glue, robby, ace, ice  -KD    Retired Wound - Properties Group Date first assessed: 05/21/25 -KD Time first assessed: 0943  -KD Side: Left  -KD Location: knee  -KD Additional Comments: glue, robby, ace, ice  -KD      Row Name             NPWT (Negative Pressure Wound Therapy) 05/21/25 0944 left knee    NPWT (Negative Pressure Wound Therapy) - Properties Group Placement Date: 05/21/25 -KD Placement Time: 0944 -KD Location: left knee  -KD Additional Comments: robby  -KD    Retired NPWT (Negative Pressure Wound Therapy) - Properties Group Placement Date: 05/21/25 -KD Placement Time: 0944 -KD Location: left knee  -KD Additional Comments: robby  -KD    Retired NPWT (Negative Pressure Wound Therapy) - Properties Group Placement Date: 05/21/25  -KD Placement Time: 0944 -KD Location: left knee  -KD Additional Comments: robby  -KD    Retired NPWT (Negative Pressure Wound Therapy) -  Properties Group Placement Date: 05/21/25  -KD Placement Time: 0944 -KD Location: left knee  -KD Additional Comments: robby  -KD      Row Name 05/22/25 0913          Plan of Care Review    Outcome Evaluation PT: Patient performs sit to stand with SBA and gait with rolling walker x150 feet, SBA.  Patient performs 10 reps of LE HEP, written handout issued.  Patient reports no concerns regarding return home at this time.  Recommend outpatient PT services at discharge.  No further PT needs in acute care setting.  -       Row Name 05/22/25 0913          Positioning and Restraints    Pre-Treatment Position sitting in chair/recliner  -     Post Treatment Position chair  -JW     In Chair reclined;call light within reach;encouraged to call for assist;notified nsg  -       Row Name 05/22/25 0913          Progress Summary (PT)    Progress Toward Functional Goals (PT) progress toward functional goals is good;prepare for discharge  -NINFA               User Key  (r) = Recorded By, (t) = Taken By, (c) = Cosigned By      Initials Name Provider Type     Ilda Avitia, RN Registered Nurse    Rosa M Kingsley, PT Physical Therapist                      Physical Therapy Education       Title: PT OT SLP Therapies (Done)       Topic: Physical Therapy (Resolved)       Point: Mobility training (Resolved)       Learning Progress Summary            Patient Acceptance, E,TB, VU by NINFA at 5/22/2025 1057    Acceptance, E,TB, VU by  at 5/21/2025 1354                      Point: Home exercise program (Resolved)       Learning Progress Summary            Patient Acceptance, E,TB, VU by NINFA at 5/22/2025 1057                                      User Key       Initials Effective Dates Name Provider Type Discipline     06/16/21 -  Avtar Barbosa, PT Physical Therapist PT    NINFA 06/16/21 -  Rosa M Carlson PT Physical Therapist PT                    PT Recommendation and Plan  Anticipated Discharge Disposition (PT): home with outpatient  therapy services  Progress Summary (PT)  Progress Toward Functional Goals (PT): progress toward functional goals is good, prepare for discharge  Outcome Evaluation: PT: Patient performs sit to stand with SBA and gait with rolling walker x150 feet, SBA.  Patient performs 10 reps of LE HEP, written handout issued.  Patient reports no concerns regarding return home at this time.  Recommend outpatient PT services at discharge.  No further PT needs in acute care setting.     Outcome Measures       Row Name 05/22/25 1058 05/22/25 0822          How much help from another person do you currently need...    Turning from your back to your side while in flat bed without using bedrails? 4  -JW --     Moving from lying on back to sitting on the side of a flat bed without bedrails? 4  -JW --     Moving to and from a bed to a chair (including a wheelchair)? 3  -JW --     Standing up from a chair using your arms (e.g., wheelchair, bedside chair)? 3  -JW --     Climbing 3-5 steps with a railing? 3  -JW --     To walk in hospital room? 3  -JW --     AM-PAC 6 Clicks Score (PT) 20  -JW --        How much help from another is currently needed...    Putting on and taking off regular lower body clothing? -- 4  -EN     Bathing (including washing, rinsing, and drying) -- 4  -EN     Toileting (which includes using toilet bed pan or urinal) -- 4  -EN     Putting on and taking off regular upper body clothing -- 4  -EN     Taking care of personal grooming (such as brushing teeth) -- 4  -EN     Eating meals -- 4  -EN     AM-PAC 6 Clicks Score (OT) -- 24  -EN        PADD    Diagnosis 1  -JW --     Gender 1  -JW --     Age Group 2  -JW --     Gait Distance 1  -JW --     Assist Level 2  -JW --     Home Support 3  -JW --     PADD Score 10  -JW --     Patient Preference home with outpatient rehab  -JW --     Prediction by PADD Score directly home (with home health or out-patient rehab)  -JW --        Functional Assessment    Outcome Measure Options  AM-PAC 6 Clicks Basic Mobility (PT);PADD  -JW --               User Key  (r) = Recorded By, (t) = Taken By, (c) = Cosigned By      Initials Name Provider Type    Poly Anna, OTR Occupational Therapist    Rosa M Kingsley, PT Physical Therapist                     Time Calculation:    PT Charges       Row Name 05/22/25 1058             Time Calculation    Start Time 0913  -      Stop Time 0936  -      Time Calculation (min) 23 min  -      PT Received On 05/22/25  -         Timed Charges    61652 - PT Therapeutic Exercise Minutes 10  -JW      31862 - Gait Training Minutes  13  -         Total Minutes    Timed Charges Total Minutes 23  -       Total Minutes 23  -                User Key  (r) = Recorded By, (t) = Taken By, (c) = Cosigned By      Initials Name Provider Type    Rosa M Kingsley, PT Physical Therapist                  Therapy Charges for Today       Code Description Service Date Service Provider Modifiers Qty    34305225794 HC PT THER PROC EA 15 MIN 5/22/2025 Rosa M Carlson, PT GP 1    63740341837 HC GAIT TRAINING EA 15 MIN 5/22/2025 Rosa M Carlson, PT GP 1            PT G-Codes  Outcome Measure Options: AM-PAC 6 Clicks Basic Mobility (PT), PADD  AM-PAC 6 Clicks Score (PT): 20  AM-PAC 6 Clicks Score (OT): 24    PT Discharge Summary  Anticipated Discharge Disposition (PT): home with outpatient therapy services    Rosa M Carlson PT  5/22/2025

## 2025-05-22 NOTE — CASE MANAGEMENT/SOCIAL WORK
Discharge Planning Assessment   Jason     Patient Name: Fanny Winchester  MRN: [id number]  Today's Date: 5/22/2025    Admit Date: 5/21/2025    Plan: Home with daughter to help and OP PT at [facility]   Discharge Needs Assessment       Row Name 05/22/25 0918       Living Environment    People in Home alone    Current Living Arrangements home    Duration at Residence since 1998    Potentially Unsafe Housing Conditions none    In the past 12 months has the electric, gas, oil, or water company threatened to shut off services in your home? No    Primary Care Provided by self    Provides Primary Care For no one    Caregiving Concerns pt voiced no care giving concerns at this time    Family Caregiver if Needed child(betty), adult    Family Caregiver Names Mandy Johnson, daughter    Quality of Family Relationships unable to assess    Able to Return to Prior Arrangements yes    Living Arrangement Comments Patient states she lives alone in a single story house with a ramp and 5 steps with handrail to gain entry       Resource/Environmental Concerns    Resource/Environmental Concerns none    Transportation Concerns none       Transportation Needs    In the past 12 months, has lack of transportation kept you from medical appointments or from getting medications? no    In the past 12 months, has lack of transportation kept you from meetings, work, or from getting things needed for daily living? No       Food Insecurity    Within the past 12 months, you worried that your food would run out before you got the money to buy more. Never true    Within the past 12 months, the food you bought just didn't last and you didn't have money to get more. Never true       Transition Planning    Patient/Family Anticipates Transition to home with family    Patient/Family Anticipated Services at Transition ;outpatient care    Transportation Anticipated family or friend will provide  pt states her daughter Mandy will be able  to provide ride home at discharge       Discharge Needs Assessment    Readmission Within the Last 30 Days no previous admission in last 30 days    Current Outpatient/Agency/Support Group --  none    Equipment Currently Used at Home walker, rolling;shower chair;commode;ramp    Concerns to be Addressed adjustment to diagnosis/illness;discharge planning    Do you want help finding or keeping work or a job? I do not need or want help    Do you want help with school or training? For example, starting or completing job training or getting a high school diploma, GED or equivalent No    Concerns Comments pt will need OP PT at discharge    Anticipated Changes Related to Illness none    Equipment Needed After Discharge none    Outpatient/Agency/Support Group Needs outpatient therapy    Discharge Facility/Level of Care Needs outpatient therapy    Provided Post Acute Provider List? Refused    Refused Provider List Comment pt declined    Patient's Choice of Community Agency(s) none    Current Discharge Risk --  none    Discharge Coordination/Progress Patient states she plans on returning home at discharge with her daughter to help as needed and OP PT.                   Discharge Plan       Row Name 05/22/25 0921       Plan    Plan Home with daughter to help and OP PT at [facility]    Patient/Family in Agreement with Plan yes    Plan Comments Into room and introduced self and role of CM. Discussed discharge disposition with patient at bedside. Patient is currently sitting up in the chair and voiced no complaints. Patient confirms the info on her face sheet is correct and she see's Dr. Chris Perez as PCP. She states she uses [facility] in [place] and currently has no problem picking up or paying for her meds. She also states she does not have a living will and declines information regarding one. Patient states she lives alone in a single story house with five steps and handrail to gain entry. She states she  recently has a ramp installed for easier mobility in and out of the house and normally has no issues entering the home or maneuvering inside. She states she is independent with her ADL's, works and drives and her daughter Mandy will be able to provide ride home at discharge. She also states she has a rolling walker, BSC and shower chair at home and does not anticipate needing any other equipment at discharge. CM reviewed with patient that she has [facility]25 @ 9:00am and she verbalized understanding and is agreeable to this service. Patient states she plans on returning home at discharge and her daughter will be staying and helping as needed and OP PT. She had no other questions. CM will follow.                  Continued Care and Services - Admitted Since 5/21/2025       Durable Medical Equipment       Service Provider Request Status Services Address Phone Fax Patient Preferred    [facility]  Selected Durable Medical Equipment [address], [place] [address] [phone] --                  Expected Discharge Date and Time       Expected Discharge Date Expected Discharge Time    May 22, 2025            Demographic Summary    No documentation.                  Functional Status    No documentation.                  Psychosocial    No documentation.                  Abuse/Neglect    No documentation.                  Legal    No documentation.                  Substance Abuse    No documentation.                  Patient Forms    No documentation.                     Lesly Hernandez RN

## 2025-05-22 NOTE — PROGRESS NOTES
Physical Therapy Initial Evaluation and Plan of Care    [address] [facility] [address]      Patient: Fanny Winchester   : 1965  Diagnosis/ICD-10 Code:  Status post left knee replacement [Z96.652]  Referring practitioner: Isaiah Meadows MD  Date of Initial Visit: 2025  Today's Date:  2025  Patient seen for 1 sessions           Subjective Questionnaire: LEFS:       Subjective Evaluation    History of Present Illness  Date of surgery: 2025  Mechanism of injury: Pt underwent L TKA on 25 by Dr. Meadows. Pt reports she tried countless conservative treatments consisting of activity modification and NSAIDs, low-impact exercise, observation and multiple intra-articular injections.  She states the pain is located anterior medially in the knee and is worse with activity and better with rest. Pt agreed to do TKA this summer. Went to [email or web address] graduation yesterday and went out to eat but then pain block wore off. Did not sleep well last night. Daughter is staying with her for at least 4 weeks; pt is off work until Aug 11th.     X ray post op  Postoperative changes status post total knee arthroplasty. Near-anatomic alignment is noted. There is no evidence for periimplant fracture.    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 [place] Quality of life: excellent    Pain  Current pain ratin  At worst pain rating: 10  Location: L knee  Quality: dull ache, discomfort, tight, throbbing and radiating  Relieving factors: ice, medications and change in position  Aggravating factors: lifting, movement, stairs, standing, ambulation, sleeping, squatting, repetitive movement and prolonged positioning  Progression: worsening    Social Support  Lives in: one-story house (ramp entry)  Lives with: adult children (daughter staying with her)    Hand dominance: right    Diagnostic Tests  X-ray: normal    Treatments  Previous  treatment: injection treatment and physical therapy  Patient Goals  Patient goals for therapy: decreased pain, decreased edema, improved balance, increased motion, increased strength, independence with ADLs/IADLs, return to sport/leisure activities and return to work           Past Medical History:   Diagnosis Date    Anxiety     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    DDD (degenerative disc disease), lumbar 03/28/2022    Fibromyalgia     GERD (gastroesophageal reflux disease)     Osteoarthritis     Rheumatoid arthritis involving multiple sites       Past Surgical History:   Procedure Laterality Date    COLONOSCOPY N/A 12/22/2020    Procedure: COLONOSCOPY with polypectomy;  Surgeon: Adarsh Arredondo MD;  Location: [facility] OR;  Service: Gastroenterology;  Laterality: N/A;  Rectal polyp    ENDOSCOPY N/A 12/22/2020    Procedure: ESOPHAGOGASTRODUODENOSCOPY with biopsies;  Surgeon: Adarsh Arredondo MD;  Location: [facility] OR;  Service: Gastroenterology;  Laterality: N/A;  Reflux esophagitis  Gastritis  Duodenitis  Hiatal hernia  Biopsies: distal esophagus, gastric, duodenum    GANGLION CYST EXCISION Left 2019    HYSTERECTOMY  2000    OOPHORECTOMY Right 11/2020    TOTAL KNEE ARTHROPLASTY Left 5/21/2025    Procedure: TOTAL KNEE ARTHROPLASTY WITH CORI ROBOT;  Surgeon: Isaiah Meadows MD;  Location: [facility] OR;  Service: Robotics - Ortho;  Laterality: Left;    TUMOR REMOVAL      ULNAR NERVE TRANSPOSITION Left 2019          Objective          Observations   Left Knee   Positive for edema, effusion and incision.       Palpation   Left   Hypertonic in the distal biceps femoris, distal semimembranosus, distal semitendinosus, lateral gastrocnemius, medial gastrocnemius, rectus femoris, vastus lateralis and vastus medialis.   Tenderness of the distal biceps femoris, distal semimembranosus, distal semitendinosus, lateral gastrocnemius and medial gastrocnemius.     Tenderness   Left Knee   Tenderness in the inferior  patella, lateral joint line, lateral patella, medial joint line, medial patella, patellar tendon and superior patella.     Neurological Testing     Sensation     Knee   Left Knee   Intact: Light touch    Right Knee   Intact: light touch     Additional Neurological Details  L knee numbness and tingling especially around incision    Active Range of Motion   Left Knee   Flexion: 52 degrees   Extensor la degrees     Right Knee   Flexion: 128 degrees   Extension: 0 degrees     Passive Range of Motion   Left Knee   Flexion: 60 degrees   Extension: 10 degrees     Patellar Mobility   Left Knee Hypomobile in the left medial, left lateral, left superior and left inferior patellar tendon(s).     Patellar Static Positioning   Left Knee: WFL  Right Knee: WFL    Strength/Myotome Testing     Left Hip   Planes of Motion   Flexion: 2+    Left Knee   Flexion: 2  Extension: 2+  Quadriceps contraction: poor    Right Knee   Flexion: 5  Extension: 5  Quadriceps contraction: good    Additional Strength Details  Unable to complete SLR in supine or LAQ in seated    Tests     Additional Tests Details  Negative Gucci's sign LLE    Swelling     Left Knee Girth Measurement (cm)   Joint line: 39 cm  10 cm above joint line: 45 cm  10 cm below joint line: 37 cm    Right Knee Girth Measurement (cm)   Joint line: 33.5.    Ambulation   Weight-Bearing Status   Weight-Bearing Status (Left): weight-bearing as tolerated   Weight-Bearing Status (Right): weight-bearing as tolerated    Assistive device used: front-wheeled walker    Observational Gait   Gait: antalgic   Decreased left stance time, left swing time and left step length.           Assessment & Plan       Assessment  Impairments: abnormal gait, abnormal muscle firing, abnormal or restricted ROM, activity intolerance, impaired balance, impaired physical strength, lacks appropriate home exercise program, pain with function and weight-bearing intolerance   Functional limitations: carrying  objects, sleeping, walking, uncomfortable because of pain, moving in bed, sitting, standing, stooping and unable to perform repetitive tasks   Assessment details: Fanny Winchester is a 59 y.o. year-old female referred to physical therapy s/p L TKA on 5/21/25. She presents with a stable clinical presentation.  She has comorbidities RA/OA and personal factors of living by herself that may affect her progress in the plan of care.  Signs and symptoms are consistent with physical therapy diagnosis of impaired L knee ROM, strength, stability and swelling with impaired functional mobility requiring use of Rwx with antalgic gait. Patient is appropriate for skilled physical therapy in order to reduce pain and increase ease with daily mobility. During evaluation, pt educated on anatomy, goal of interventions, positioning, HEP, ice, and body mechanics to promote healthy lifestyle and improve quality of life.  Prognosis: good    Goals  Plan Goals: STG: ~6 weeks  1. Pt will be demonstrate 0-90 degrees of knee extension and flexion to be able to sit in a chair properly  2. Pt will demonstrate a SLR without extensor lag to improve terminal knee extension  3. Pt will walk into the clinic without antalgic gait pattern without AD  4. Decrease swelling at L joint line from 39 cm to 35 cm or less    LTG: ~12 weeks  1. Pt will be demonstrate 0-120 degrees of knee extension and flexion to be able to ascend/descend stairs  2. Pt will improve LEFS score to 50/80 to improve subjective function of LE with ADLs  3. Pt will be able to walk/stand for 20 minutes with tolerable knee pain   4. Pt will improve knee extensor and knee flexor strength to 5/5 to be able to complete transfers without UE assistance  5. Pt will be able to bend and  kids from floor level to be able to return to work  6. Pt will be able to get on and off the floor to be able to return to her job at the       Plan  Therapy options: will be seen for skilled  therapy services  Planned modality interventions: cryotherapy, electrical stimulation/Russian stimulation, thermotherapy (hydrocollator packs), ultrasound, dry needling and TENS  Planned therapy interventions: ADL retraining, balance/weight-bearing training, body mechanics training, flexibility, functional ROM exercises, gait training, home exercise program, joint mobilization, manual therapy, neuromuscular re-education, postural training, soft tissue mobilization, spinal/joint mobilization, strengthening, stretching and therapeutic activities  Treatment plan discussed with: patient (daughter present)  Plan details: 2 times per week 12 weeks        Visit Diagnoses:    ICD-10-CM ICD-9-CM   1. Status post left knee replacement  Z96.652 V43.65   2. Decreased range of motion (ROM) of left knee  M25.662 719.56   3. Left leg weakness  R29.898 729.89   4. Gait disturbance  R26.9 781.2   5. Difficulty navigating stairs  Z78.9 V49.89   6. Acute pain of left knee  M25.562 719.46       Timed:  Manual Therapy:    5     mins  77742;  Therapeutic Exercise:    15     mins  77028;     Neuromuscular Patricia:    -    mins  05214;    Therapeutic Activity:     10     mins  83242;     Gait Training:      -     mins  78806;     Ultrasound:     -     mins  64772;    Electrical Stimulation:    -     mins  82220 ( );    Low Eval                       20      Mins  89015  Mod Eval                        -     Mins  24738  High Eval                       -     Mins  94928    Untimed:  Electrical Stimulation:    -     mins  64648 ( );  Mechanical Traction:    -     mins  50383;   Electrical Stimulation:    -     mins  73568 ( );      Timed Treatment:   30   mins   Total Treatment:     70   mins    PT SIGNATURE: COOKIE [place] license: [id number]  DATE TREATMENT INITIATED: 5/23/2025    Initial Certification  Certification Period: 8/21/2025  I certify that the therapy services are furnished while this patient is under my  care.  The services outlined above are required by this patient, and will be reviewed every 90 days.     PHYSICIAN: Isaiah Meadows MD      DATE:     Please sign and return via fax to [phone]. Thank you, [facility].

## 2025-05-23 ENCOUNTER — READMISSION MANAGEMENT (OUTPATIENT)
Dept: CALL CENTER | Facility: HOSPITAL | Age: 60
End: 2025-05-23
Payer: COMMERCIAL

## 2025-05-23 ENCOUNTER — TREATMENT (OUTPATIENT)
Dept: PHYSICAL THERAPY | Facility: CLINIC | Age: 60
End: 2025-05-23
Payer: COMMERCIAL

## 2025-05-23 ENCOUNTER — NURSE TRIAGE (OUTPATIENT)
Dept: CALL CENTER | Facility: HOSPITAL | Age: 60
End: 2025-05-23
Payer: COMMERCIAL

## 2025-05-23 DIAGNOSIS — R29.898 LEFT LEG WEAKNESS: ICD-10-CM

## 2025-05-23 DIAGNOSIS — Z96.652 STATUS POST LEFT KNEE REPLACEMENT: Primary | ICD-10-CM

## 2025-05-23 DIAGNOSIS — Z78.9 DIFFICULTY NAVIGATING STAIRS: ICD-10-CM

## 2025-05-23 DIAGNOSIS — R26.9 GAIT DISTURBANCE: ICD-10-CM

## 2025-05-23 DIAGNOSIS — M25.662 DECREASED RANGE OF MOTION (ROM) OF LEFT KNEE: ICD-10-CM

## 2025-05-23 DIAGNOSIS — M25.562 ACUTE PAIN OF LEFT KNEE: ICD-10-CM

## 2025-05-23 NOTE — OUTREACH NOTE
Prep Survey      Flowsheet Row Responses   [facility] patient discharged from? [place]   Is LACE score < 7 ? Yes   Eligibility Readm Mgmt   Discharge diagnosis Primary osteoarthritis of left knee   Does the patient have one of the following disease processes/diagnoses(primary or secondary)? Other   Does the patient have Home health ordered? No   Is there a DME ordered? Yes   What DME was ordered? [employer] [facility]urw[facility]--[facility]   Comments regarding appointments OP PT at [facility]   Medication alerts for this patient see AVS   Prep survey completed? Yes            Kelsie BELLAMY - Registered Nurse              Kelsie BELLAMY - Registered Nurse

## 2025-05-23 NOTE — TELEPHONE ENCOUNTER
Caller states recent Post Op Knee Surgery and now even with ICE, Elevation and taking pain medication swelling severe and pain still eight. Caller states pain behind knee. Caller denies other symptoms states maybe small new area of blood. Caller states not able to rest all night. Caller states was up on knee yesterday for graduation. Caller denies other symptoms such as fever and vomiting. Caller has number for surgeon and will call now.             Reason for Disposition   [1] SEVERE post-op pain (e.g., excruciating, pain scale 8-10) AND [2] not controlled with pain medications    Additional Information   Negative: Sounds like a life-threatening emergency to the triager   Negative: Chest pain   Negative: Difficulty breathing   Negative: Acting confused (e.g., disoriented, slurred speech) or excessively sleepy   Negative: Post-Op tonsil and adenoid surgery, symptoms or questions about   Negative: Surgical incision symptoms and questions   Negative: [1] Pain or burning with passing urine (urination) AND [2] male   Negative: [1] Pain or burning with passing urine (urination) AND [2] female   Negative: Constipation   Negative: New or worsening leg (calf, thigh) pain   Negative: New or worsening leg swelling   Negative: Dizziness is severe, or persists > 24 hours after surgery   Negative: Pain, redness, swelling, or pus at IV Site   Negative: Symptoms arising from use of a urinary catheter (e.g., Coude, [place])   Negative: Cast problems or questions   Negative: Medication question   Negative: [1] Widespread rash AND [2] bright red, sunburn-like   Negative: [1] SEVERE headache AND [2] after spinal (epidural) anesthesia   Negative: [1] Vomiting AND [2] persists > 4 hours   Negative: [1] Vomiting AND [2] abdomen looks much more swollen than usual   Negative: [1] Drinking very little AND [2] dehydration suspected (e.g., no urine > 12 hours, very dry mouth, very lightheaded)   Negative: Patient sounds very sick or weak to the  "triager   Negative: Sounds like a serious complication to the triager   Negative: Fever > 100.4 F (38.0 C)    Answer Assessment - Initial Assessment Questions  1. SYMPTOM: \"What's the main symptom you're concerned about?\" (e.g., pain, fever, vomiting)      Pain, Swelling   2. ONSET: \"When did swelling  start?\"      Tonight   3. SURGERY: \"What surgery did you have?\"      TOTAL KNEE ARTHROPLASTY WITH CORI ROBOT  4. DATE of SURGERY: \"When was the surgery?\"       Wednesday   5. ANESTHESIA: \" What type of anesthesia did you have?\" (e.g., general, spinal, epidural, local)        6. PAIN: \"Is there any pain?\" If Yes, ask: \"How bad is it?\"  (Scale 1-10; or mild, moderate, severe)      8  7. FEVER: \"Do you have a fever?\" If Yes, ask: \"What is your temperature, how was it measured, and when did it start?\"      Denies   8. VOMITING: \"Is there any vomiting?\" If Yes, ask: \"How many times?\"      Denies   9. BLEEDING: \"Is there any bleeding?\" If Yes, ask: \"How much?\" and \"Where?\"      Small amount of new old blood   10. OTHER SYMPTOMS: \"Do you have any other symptoms?\" (e.g., drainage from wound, painful urination, constipation)    Protocols used: Post-Op Symptoms and Questions-ADULT-    "

## 2025-05-27 ENCOUNTER — TREATMENT (OUTPATIENT)
Dept: PHYSICAL THERAPY | Facility: CLINIC | Age: 60
End: 2025-05-27
Payer: COMMERCIAL

## 2025-05-27 DIAGNOSIS — R26.9 GAIT DISTURBANCE: ICD-10-CM

## 2025-05-27 DIAGNOSIS — Z96.652 S/P TKR (TOTAL KNEE REPLACEMENT), LEFT: ICD-10-CM

## 2025-05-27 DIAGNOSIS — M25.662 DECREASED RANGE OF MOTION (ROM) OF LEFT KNEE: ICD-10-CM

## 2025-05-27 DIAGNOSIS — Z96.652 STATUS POST LEFT KNEE REPLACEMENT: Primary | ICD-10-CM

## 2025-05-27 DIAGNOSIS — R29.898 LEFT LEG WEAKNESS: ICD-10-CM

## 2025-05-27 DIAGNOSIS — M25.562 ACUTE PAIN OF LEFT KNEE: ICD-10-CM

## 2025-05-27 DIAGNOSIS — Z78.9 DIFFICULTY NAVIGATING STAIRS: ICD-10-CM

## 2025-05-27 PROCEDURE — 97530 THERAPEUTIC ACTIVITIES: CPT | Performed by: PHYSICAL THERAPIST

## 2025-05-27 PROCEDURE — 97110 THERAPEUTIC EXERCISES: CPT | Performed by: PHYSICAL THERAPIST

## 2025-05-27 RX ORDER — OXYCODONE AND ACETAMINOPHEN 5; 325 MG/1; MG/1
1 TABLET ORAL EVERY 4 HOURS PRN
Qty: 30 TABLET | Refills: 0 | Status: SHIPPED | OUTPATIENT
Start: 2025-05-27 | End: 2025-06-02 | Stop reason: SDUPTHER

## 2025-05-27 NOTE — PROGRESS NOTES
Physical Therapy Daily Treatment Note      Patient: Fanny Winchester   : 1965  Referring practitioner: Isaiah Meadows MD  Date of Initial Visit: Type: THERAPY  Noted: 2025  Today's Date:  2025  Patient seen for 2 sessions         Fanny Winchester reports: L knee pain 4/10; increased pain last night but did ok all weekend. Needs pain meds refilled.               Objective     AAROM of L knee flexion: 80 degrees  L knee AROM ext: 9 degrees from neutral      See Exercise, Manual, and Modality Logs for complete treatment.       Assessment/Plan  Increased bruising noted today vs last session; mostly proximal medial and posterior thigh. Sig improvement in L knee ROM especially flexion. Added SAQ but still needing assistance at heel for lifting. Pt still using Rwx for support with minimal antalgia. Also added Nustep for ROM of L knee; has not heard from [employer] about her bike. Ice at end of session.        Progress per Plan of Care and Progress strengthening /stabilization /functional activity           Timed:  Manual Therapy:    -     mins  33419;  Therapeutic Exercise:    20     mins  65488;     Neuromuscular Patricia:    -    mins  56977;    Therapeutic Activity:     10     mins  69243;     Gait Training:      -     mins  73975;     Ultrasound:     -     mins  67822;      Untimed:  Electrical Stimulation:    -     mins  03696 ( );  Mechanical Traction:    -     mins  10697;   Dry needling:      -     mins  49252/    Timed Treatment:   30   mins   Total Treatment:     40   mins  Kristan Wang PT  Physical Therapist    License #: [id number]                 deep pain

## 2025-05-27 NOTE — TELEPHONE ENCOUNTER
Rx Refill Note  Requested Prescriptions     Pending Prescriptions Disp Refills    oxyCODONE-acetaminophen (PERCOCET) 5-325 MG per tablet 30 tablet 0     Sig: Take 1 tablet by mouth Every 4 (Four) Hours As Needed for Severe Pain.      Last office visit with prescribing clinician: 1/10/2025      Next office visit with prescribing clinician: Visit date not found   Last Filled: 5/21/2025    Encounter Diagnosis   Name Primary?    S/P TKR (total knee replacement), left       Date of Surgery: 5/21/2025      Annette Barbosa MA  05/27/25, 11:57 EDT    Previous RX pended for your approval, change or denial.     {TIP  Encounters:    {TIP  Please add Last Relevant Lab Date if appropriate:  {TIP  Is Refill Pharmacy correct?:

## 2025-05-28 ENCOUNTER — READMISSION MANAGEMENT (OUTPATIENT)
Dept: CALL CENTER | Facility: HOSPITAL | Age: 60
End: 2025-05-28
Payer: COMMERCIAL

## 2025-05-28 NOTE — OUTREACH NOTE
LAG < 7 Survey      Flowsheet Row Responses   [facility] patient discharged from? [place]   Does the patient have one of the following disease processes/diagnoses(primary or secondary)? Other   BHLAG <7 Attempt successful? Yes   Call start time 1419   Call end time 1421   Discharge diagnosis s/p left total knee replacement   Meds reviewed with patient/caregiver? Yes   Is the patient having any side effects they believe may be caused by any medication additions or changes? No   Does the patient have all medications ordered at discharge? Yes   Is the patient taking all medications as directed (includes completed medication regime)? Yes   [facility]   Does the patient have a primary care provider?  Yes   Does the patient have an appointment with their PCP within 7 days of discharge? Yes   Comments regarding PCP will be following up with her orthopedic surgeon on 6/4   Has the patient kept scheduled appointments due by today? Yes   Has home health visited the patient within 72 hours of discharge? N/A   Psychosocial issues? No   Did the patient receive a copy of their discharge instructions? Yes   Nursing interventions Reviewed instructions with patient   What is the patient's perception of their health status since discharge? Improving   Is the patient/caregiver able to teach back signs and symptoms related to disease process for when to call PCP? Yes   Is the patient/caregiver able to teach back signs and symptoms related to disease process for when to call 911? Yes   Is the patient/caregiver able to teach back the hierarchy of who to call/visit for symptoms/problems? PCP, Specialist, Home health nurse, Urgent Care, ED, 911 Yes   Graduated Yes   Is the patient interested in additional calls from an ambulatory ? No   Wrap up additional comments Doing great, denies any questions or concerns.            Kailyn LEBLANC - Registered Nurse

## 2025-05-29 ENCOUNTER — TREATMENT (OUTPATIENT)
Dept: PHYSICAL THERAPY | Facility: CLINIC | Age: 60
End: 2025-05-29
Payer: COMMERCIAL

## 2025-05-29 DIAGNOSIS — M25.562 ACUTE PAIN OF LEFT KNEE: ICD-10-CM

## 2025-05-29 DIAGNOSIS — R29.898 LEFT LEG WEAKNESS: ICD-10-CM

## 2025-05-29 DIAGNOSIS — Z78.9 DIFFICULTY NAVIGATING STAIRS: ICD-10-CM

## 2025-05-29 DIAGNOSIS — R26.9 GAIT DISTURBANCE: ICD-10-CM

## 2025-05-29 DIAGNOSIS — Z96.652 STATUS POST LEFT KNEE REPLACEMENT: Primary | ICD-10-CM

## 2025-05-29 DIAGNOSIS — M25.662 DECREASED RANGE OF MOTION (ROM) OF LEFT KNEE: ICD-10-CM

## 2025-05-29 NOTE — PROGRESS NOTES
Physical Therapy Daily Treatment Note      Patient: Fanny Winchester   : 1965  Referring practitioner: Isaiah Meadows MD  Date of Initial Visit: Type: THERAPY  Noted: 2025  Today's Date:  2025  Patient seen for 3 sessions         Fanny Winchester reports: she is doing pretty good; L knee pain 3-4/10 at most with bending; can't get comfortable at night but not waking up/not sleeping from pain.               Objective     L knee ext AROM: 6-7 degrees from neutral after stretching    L knee flexion AAROM with strap: 82 degrees; 85 degrees seated PROM     See Exercise, Manual, and Modality Logs for complete treatment.       Assessment/Plan  Improving quad activation, able to lift LLE with SAQ and LAQ with sig ext lag but no assistance today. Pt reports any lifting or having to hold her leg up increased her pain at supra-patellar area along quad tendon with pain up to 8-9/10. Added step ups and standing WS today along with HS stretch with strap with good tolerance; difficulty with lateral step ups vs fwd step ups. Ice at end of session for pain and soreness control.        Progress per Plan of Care and Progress strengthening /stabilization /functional activity         Timed:  Manual Therapy:    5     mins  26547;  Therapeutic Exercise:    25     mins  60469;     Neuromuscular Patricia:    -    mins  66482;    Therapeutic Activity:     10     mins  58107;     Gait Training:      -     mins  72967;     Ultrasound:     -     mins  76800;      Untimed:  Electrical Stimulation:    -     mins  98490 ( );  Mechanical Traction:    -     mins  52924;   Dry needling:      -     mins  55887/    Timed Treatment:   40   mins   Total Treatment:     50   mins  Kristan Wang PT  Physical Therapist    License #: [id number]

## 2025-06-02 DIAGNOSIS — Z96.652 S/P TKR (TOTAL KNEE REPLACEMENT), LEFT: ICD-10-CM

## 2025-06-02 RX ORDER — OXYCODONE AND ACETAMINOPHEN 5; 325 MG/1; MG/1
1 TABLET ORAL EVERY 4 HOURS PRN
Qty: 30 TABLET | Refills: 0 | Status: SHIPPED | OUTPATIENT
Start: 2025-06-02 | End: 2025-06-04 | Stop reason: SDUPTHER

## 2025-06-02 NOTE — TELEPHONE ENCOUNTER
Rx Refill Note  Requested Prescriptions     Pending Prescriptions Disp Refills    oxyCODONE-acetaminophen (PERCOCET) 5-325 MG per tablet 30 tablet 0     Sig: Take 1 tablet by mouth Every 4 (Four) Hours As Needed for Severe Pain.      Last office visit with prescribing clinician: 1/10/2025      Next office visit with prescribing clinician: Visit date not found   Last Filled: 5/27/2025    Encounter Diagnosis   Name Primary?    S/P TKR (total knee replacement), left       Date of Surgery: 5/21/2025      Annette Barboas MA  06/02/25, 12:03 EDT    Previous RX pended for your approval, change or denial.     {TIP  Encounters:    {TIP  Please add Last Relevant Lab Date if appropriate:  {TIP  Is Refill Pharmacy correct?:   
223.1

## 2025-06-03 ENCOUNTER — TREATMENT (OUTPATIENT)
Dept: PHYSICAL THERAPY | Facility: CLINIC | Age: 60
End: 2025-06-03
Payer: COMMERCIAL

## 2025-06-03 DIAGNOSIS — Z96.652 STATUS POST LEFT KNEE REPLACEMENT: Primary | ICD-10-CM

## 2025-06-03 DIAGNOSIS — R29.898 LEFT LEG WEAKNESS: ICD-10-CM

## 2025-06-03 DIAGNOSIS — R26.9 GAIT DISTURBANCE: ICD-10-CM

## 2025-06-03 DIAGNOSIS — Z78.9 DIFFICULTY NAVIGATING STAIRS: ICD-10-CM

## 2025-06-03 DIAGNOSIS — M25.662 DECREASED RANGE OF MOTION (ROM) OF LEFT KNEE: ICD-10-CM

## 2025-06-03 DIAGNOSIS — M25.562 ACUTE PAIN OF LEFT KNEE: ICD-10-CM

## 2025-06-03 PROCEDURE — 97110 THERAPEUTIC EXERCISES: CPT | Performed by: PHYSICAL THERAPIST

## 2025-06-03 PROCEDURE — 97530 THERAPEUTIC ACTIVITIES: CPT | Performed by: PHYSICAL THERAPIST

## 2025-06-03 NOTE — PROGRESS NOTES
Physical Therapy Daily Treatment Note      Patient: Fanny Winchester   : 1965  Referring practitioner: Isaiah Meadows MD  Date of Initial Visit: Type: THERAPY  Noted: 2025  Today's Date:  6/3/2025  Patient seen for 4 sessions         Fanny Winchester reports: L knee pain 1-2/10; pushed the exercises yesterday so a little bit more sore today.              Objective     L knee flexion AROM: 100 degrees seated; 97 degrees supine  L knee ext AROM: 7 degrees      See Exercise, Manual, and Modality Logs for complete treatment.       Assessment/Plan  Pt able to tolerate addition of marches and cues for gait  mechanics with weight shifts. Improved quad activation with LAQ and SAQ and pt reports she is getting better with car transfers. Sig improved flexibility with knee flexion; still stiff and tight with knee ext. She got approved for ROM tech machine which should be delivered tomorrow. Ice post tx to decreased pain/soreness and swelling.        Progress per Plan of Care and Progress strengthening /stabilization /functional activity           Timed:  Manual Therapy:    -     mins  07804;  Therapeutic Exercise:    20     mins  12152;     Neuromuscular Patricia:    -    mins  65136;    Therapeutic Activity:     10     mins  70367;     Gait Training:      -     mins  55431;     Ultrasound:     -     mins  79309;      Untimed:  Electrical Stimulation:    -     mins  39245 ( );  Mechanical Traction:    -     mins  02594;   Dry needling:      -     mins  55158/    Timed Treatment:   30   mins   Total Treatment:     40   mins  Kristan Wang PT  Physical Therapist    License #: [id number]

## 2025-06-04 ENCOUNTER — OFFICE VISIT (OUTPATIENT)
Dept: ORTHOPEDIC SURGERY | Facility: CLINIC | Age: 60
End: 2025-06-04
Payer: COMMERCIAL

## 2025-06-04 VITALS — WEIGHT: 180 LBS | BODY MASS INDEX: 31.89 KG/M2 | HEIGHT: 63 IN

## 2025-06-04 DIAGNOSIS — Z96.652 S/P TKR (TOTAL KNEE REPLACEMENT), LEFT: Primary | ICD-10-CM

## 2025-06-04 RX ORDER — OXYCODONE AND ACETAMINOPHEN 5; 325 MG/1; MG/1
1 TABLET ORAL EVERY 4 HOURS PRN
Qty: 30 TABLET | Refills: 0 | Status: SHIPPED | OUTPATIENT
Start: 2025-06-04

## 2025-06-04 NOTE — PROGRESS NOTES
Subjective: Status post left total knee arthroplasty     Patient ID: Fanny Winchester is a 59 y.o. female.    Chief Complaint:    History of Present Illness 59-year-old female is 2 weeks out from her surgery is doing extremely well.  Has noted significant reduction from the preoperative pain.  Is ambulating with a walker basically she just just for balance.  Has been doing her physical therapy.  Has been taking the aspirin as instructed.       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    DDD (degenerative disc disease), lumbar 03/28/2022    Fibromyalgia     GERD (gastroesophageal reflux disease)     Osteoarthritis     Rheumatoid arthritis involving multiple sites      Past Surgical History:   Procedure Laterality Date    COLONOSCOPY N/A 12/22/2020    Procedure: COLONOSCOPY with polypectomy;  Surgeon: Adarsh Arredondo MD;  Location: [facility] OR;  Service: Gastroenterology;  Laterality: N/A;  Rectal polyp    ENDOSCOPY N/A 12/22/2020    Procedure: ESOPHAGOGASTRODUODENOSCOPY with biopsies;  Surgeon: Adarsh Arredondo MD;  Location: [facility] OR;  Service: Gastroenterology;  Laterality: N/A;  Reflux esophagitis  Gastritis  Duodenitis  Hiatal hernia  Biopsies: distal esophagus, gastric, duodenum    GANGLION CYST EXCISION Left 2019    HYSTERECTOMY  2000    OOPHORECTOMY Right 11/2020    TOTAL KNEE ARTHROPLASTY Left 5/21/2025    Procedure: TOTAL KNEE ARTHROPLASTY WITH CORI ROBOT;  Surgeon: Isaiah Meadows MD;  Location: [facility] OR;  Service: Robotics - Ortho;  Laterality: Left;    TUMOR REMOVAL      ULNAR NERVE TRANSPOSITION Left 2019     Family  History   Problem Relation Age of Onset    Heart disease Father     Hypertension Father     Hyperlipidemia Father     Heart disease Mother     Lupus Mother     Colon cancer Maternal Aunt     Colon polyps Maternal Aunt     Breast cancer Neg Hx     Ovarian cancer Neg Hx          Objective:  There were no vitals filed for this visit.      06/04/25  1402   Weight: 81.6 kg (180 lb)     Body mass index is 31.89 kg/m².           Ortho Exam   AP lateral of the left knee shows perfect position of the implant.  She is alert and oriented x 3.  Her wound is completely benign.  Per PT from yesterday she lacks about 7 degrees of extension flexion about 100 degrees.  Her calf is nontender.  Good distal pulses.  Active dorsi plantarflexion of the foot.  Tolerating the aspirin.    Assessment:        1. S/P TKR (total knee replacement), left           Plan: Patient is doing well at this time.  Reviewed the x-rays with her.  Continue the aspirin for 2 more weeks.  Return to see Dr. Meadows in 4 weeks.  Answered all questions.  Refilled her pain medicine            Work Status:    GINA query complete.    Orders:  Orders Placed This Encounter   Procedures    XR Knee 1 or 2 View Right       Medications:  No orders of the defined types were placed in this encounter.      Followup:  Return in about 4 weeks (around 7/2/2025).          Dictated utilizing Dragon dictation

## 2025-06-05 ENCOUNTER — TREATMENT (OUTPATIENT)
Dept: PHYSICAL THERAPY | Facility: CLINIC | Age: 60
End: 2025-06-05
Payer: COMMERCIAL

## 2025-06-05 DIAGNOSIS — Z78.9 DIFFICULTY NAVIGATING STAIRS: ICD-10-CM

## 2025-06-05 DIAGNOSIS — R26.9 GAIT DISTURBANCE: ICD-10-CM

## 2025-06-05 DIAGNOSIS — M25.562 ACUTE PAIN OF LEFT KNEE: ICD-10-CM

## 2025-06-05 DIAGNOSIS — M25.662 DECREASED RANGE OF MOTION (ROM) OF LEFT KNEE: ICD-10-CM

## 2025-06-05 DIAGNOSIS — Z96.652 STATUS POST LEFT KNEE REPLACEMENT: Primary | ICD-10-CM

## 2025-06-05 DIAGNOSIS — R29.898 LEFT LEG WEAKNESS: ICD-10-CM

## 2025-06-05 NOTE — PROGRESS NOTES
Physical Therapy Daily Treatment Note      Patient: Fanny Winchester   : 1965  Referring practitioner: Isaiah Meadows MD  Date of Initial Visit: Type: THERAPY  Noted: 2025  Today's Date:  2025  Patient seen for 5 sessions         Fanny Winchester reports: good follow up with surgeon; has message into him about driving. Got her ROMtech bike yesterday              Objective     L knee flexion:103 degrees    L knee ext: 5 degrees    See Exercise, Manual, and Modality Logs for complete treatment.       Assessment/Plan  Progressed to SPC with mild antalgia but improved mechanics with cues and attention to task. Able to progress to 6 inch step ups and progress reps of strengthening exercises. Bandage removed at follow up yesterday and incision looks good with no s/s of infection and swelling. Added joint mobs for knee extension; improving L knee flexion. Ice at end of session for decreased pain and swelling.        Progress per Plan of Care and Progress strengthening /stabilization /functional activity           Timed:  Manual Therapy:    5     mins  67286;  Therapeutic Exercise:    25     mins  80399;     Neuromuscular Patricia:    -    mins  34305;    Therapeutic Activity:     10     mins  53937;     Gait Training:      -     mins  43684;     Ultrasound:     -     mins  90553;      Untimed:  Electrical Stimulation:    -     mins  64343 ( );  Mechanical Traction:    -     mins  19227;   Dry needling:      -     mins  38308/    Timed Treatment:   40   mins   Total Treatment:     50   mins  Kristan Wang PT  Physical Therapist    License #: [id number]

## 2025-06-10 ENCOUNTER — TREATMENT (OUTPATIENT)
Dept: PHYSICAL THERAPY | Facility: CLINIC | Age: 60
End: 2025-06-10
Payer: COMMERCIAL

## 2025-06-10 DIAGNOSIS — M25.662 DECREASED RANGE OF MOTION (ROM) OF LEFT KNEE: ICD-10-CM

## 2025-06-10 DIAGNOSIS — R26.9 GAIT DISTURBANCE: ICD-10-CM

## 2025-06-10 DIAGNOSIS — Z78.9 DIFFICULTY NAVIGATING STAIRS: ICD-10-CM

## 2025-06-10 DIAGNOSIS — Z96.652 STATUS POST LEFT KNEE REPLACEMENT: Primary | ICD-10-CM

## 2025-06-10 DIAGNOSIS — M25.562 ACUTE PAIN OF LEFT KNEE: ICD-10-CM

## 2025-06-10 DIAGNOSIS — R29.898 LEFT LEG WEAKNESS: ICD-10-CM

## 2025-06-10 PROCEDURE — 97140 MANUAL THERAPY 1/> REGIONS: CPT

## 2025-06-10 PROCEDURE — 97110 THERAPEUTIC EXERCISES: CPT

## 2025-06-10 PROCEDURE — 97530 THERAPEUTIC ACTIVITIES: CPT

## 2025-06-10 NOTE — PROGRESS NOTES
I have reviewed the notes, assessments, and/or procedures performed by Kevyn Degroot, I concur with his documentation of Fanny Winchester.     Patience Cuevas, PT. [place] license [id number]

## 2025-06-10 NOTE — PROGRESS NOTES
Physical Therapy Daily Treatment Note    [facility]  [address] [place] [address]  [phone] (phone)  [phone] (fax)    Patient: Fanny Winchester   : 1965  Diagnosis/ICD-10 Code:  Status post left knee replacement [Z96.652]  Referring practitioner: Isaiah Meadows MD  Date of Initial Visit: 6/10/2025  Today's Date: 6/10/2025  Patient seen for 6 session         Visit Diagnoses:    ICD-10-CM ICD-9-CM   1. Status post left knee replacement  Z96.652 V43.65   2. Decreased range of motion (ROM) of left knee  M25.662 719.56   3. Gait disturbance  R26.9 781.2   4. Left leg weakness  R29.898 729.89   5. Difficulty navigating stairs  Z78.9 V49.89   6. Acute pain of left knee  M25.562 719.46             Subjective   Pt reports that her knee is feeling okay today but that she has had a rough past few days due to some other medical issues. Pt also reports she has some occasional clicking on the inside of her knee.      Objective   L Knee Ext AROM: 5 deg  L Knee Flex AROM: 110 deg    Assessment/Plan    Pt able to complete knee mobility and LE strength exercises with cues for proper muscle activation. Pt received manual therapy to improve LE ROM which she tolerated well with no issues. Pt still showing good progress with L knee mobility and able to reach L Knee flex AROM of 110 deg for first time today. Pt received ice at end of session to help reduce post-exercise soreness. Pt will continue to benefit from skilled therapy at this time to further improve her LE strength and mobility.      Timed:  Manual Therapy:     8     mins  94864;  Therapeutic Exercise:     20     mins  43607;     Neuromuscular Patricia:         mins  00631;    Therapeutic Activity:      17    mins  72925;     Gait Training:            mins  89117;     Ultrasound:           mins  36344;      Un-timed:  Electrical Stimulation:          mins  03748 ( );  Dry Needling           mins self-pay  Traction            mins 01016  Canalith Repositioning         mins 98286      Timed Treatment:  45    mins   Total Treatment:     55   mins      PT: Kveyn Degroot PT     License Number: [id number]  Electronically signed by Kevyn Degroot PT, 06/10/25, 10:04 AM EDT          Please sign in Epic or return via fax to [email or web address] . Thank you, [facility].

## 2025-06-12 ENCOUNTER — TELEPHONE (OUTPATIENT)
Dept: ORTHOPEDIC SURGERY | Facility: CLINIC | Age: 60
End: 2025-06-12
Payer: COMMERCIAL

## 2025-06-12 ENCOUNTER — TREATMENT (OUTPATIENT)
Dept: PHYSICAL THERAPY | Facility: CLINIC | Age: 60
End: 2025-06-12
Payer: COMMERCIAL

## 2025-06-12 DIAGNOSIS — Z78.9 DIFFICULTY NAVIGATING STAIRS: ICD-10-CM

## 2025-06-12 DIAGNOSIS — M25.662 DECREASED RANGE OF MOTION (ROM) OF LEFT KNEE: ICD-10-CM

## 2025-06-12 DIAGNOSIS — Z96.652 S/P TKR (TOTAL KNEE REPLACEMENT), LEFT: ICD-10-CM

## 2025-06-12 DIAGNOSIS — R29.898 LEFT LEG WEAKNESS: ICD-10-CM

## 2025-06-12 DIAGNOSIS — R26.9 GAIT DISTURBANCE: ICD-10-CM

## 2025-06-12 DIAGNOSIS — Z96.652 STATUS POST LEFT KNEE REPLACEMENT: Primary | ICD-10-CM

## 2025-06-12 DIAGNOSIS — M25.562 ACUTE PAIN OF LEFT KNEE: ICD-10-CM

## 2025-06-12 NOTE — PROGRESS NOTES
Physical Therapy Daily Treatment Note      Patient: Fanny Winchester   : 1965  Referring practitioner: Isaiah Meadows MD  Date of Initial Visit: Type: THERAPY  Noted: 2025  Today's Date:  2025  Patient seen for 7 sessions         Fanny Winchester reports: sore today; didn't sleep well last night; ROM tech bike added resistance and also really working on straightening.               Objective     L knee flexion: 110 degrees supine with strap  L knee ext: 3-4 degrees with AROM      See Exercise, Manual, and Modality Logs for complete treatment.       Assessment/Plan  Increased resistance with HS curls, added mini squats. Pt with more soreness today but has really been working on knee ext and added resistance to ROMtech bike. Improving knee ROM along with strength. Noted moderate antalgia today with WS over stance limb side to side; improves with cues. Continued ice at end of session.        Progress per Plan of Care and Progress strengthening /stabilization /functional activity    Hold PT next week due to pt going out of town; told to ask permission/talk to surgeon about swimming           Timed:  Manual Therapy:    5     mins  32677;  Therapeutic Exercise:    15     mins  09870;     Neuromuscular Patricia:    -    mins  22118;    Therapeutic Activity:     10     mins  70974;     Gait Training:      -     mins  49565;     Ultrasound:     -     mins  53171;      Untimed:  Electrical Stimulation:    -     mins  86358 ( );  Mechanical Traction:    -     mins  30481;   Dry needling:      -     mins  09361/    Timed Treatment:   30   mins   Total Treatment:     40   mins  Kristan Wang PT  Physical Therapist    License #: [id number]

## 2025-06-12 NOTE — TELEPHONE ENCOUNTER
Rx Refill Note  Requested Prescriptions     Pending Prescriptions Disp Refills    oxyCODONE-acetaminophen (PERCOCET) 5-325 MG per tablet 30 tablet 0     Sig: Take 1 tablet by mouth Every 4 (Four) Hours As Needed for Severe Pain.      Last office visit with prescribing clinician: 1/10/2025      Next office visit with prescribing clinician: 7/11/2025   Last Filled: 6/4/2025    Encounter Diagnosis   Name Primary?    S/P TKR (total knee replacement), left       Date of Surgery: 5/21/2025      Annette Barbosa MA  06/12/25, 13:18 EDT    Previous RX pended for your approval, change or denial.     {TIP  Encounters:    {TIP  Please add Last Relevant Lab Date if appropriate:  {TIP  Is Refill Pharmacy correct?:

## 2025-06-12 NOTE — TELEPHONE ENCOUNTER
Caller: SHAKEEL WOLFE     Phone Number: [phone] (home)     Reason for Call:   PATIENT CALLED IN WANTING TO KNOW IF SHE IS CLEARED TO SUBMERGED HER KNEE IN WATER GOING OUT OF TOWN. WANTED TO KNOW IF THERE WAS ANY OTHER EXERCISES SHE COULD DO WHILE AWAY DUE TO NOT BEING ABLE TO USE ROMTECH WHILE ON VACATION.   PATIENT IS 3 WEEKS POST OPERATION

## 2025-06-13 RX ORDER — OXYCODONE AND ACETAMINOPHEN 5; 325 MG/1; MG/1
1 TABLET ORAL EVERY 4 HOURS PRN
Qty: 30 TABLET | Refills: 0 | Status: SHIPPED | OUTPATIENT
Start: 2025-06-13

## 2025-06-17 ENCOUNTER — TREATMENT (OUTPATIENT)
Dept: PHYSICAL THERAPY | Facility: CLINIC | Age: 60
End: 2025-06-17
Payer: COMMERCIAL

## 2025-06-17 DIAGNOSIS — M25.662 DECREASED RANGE OF MOTION (ROM) OF LEFT KNEE: ICD-10-CM

## 2025-06-17 DIAGNOSIS — M25.562 ACUTE PAIN OF LEFT KNEE: ICD-10-CM

## 2025-06-17 DIAGNOSIS — Z96.652 STATUS POST LEFT KNEE REPLACEMENT: Primary | ICD-10-CM

## 2025-06-17 DIAGNOSIS — R26.9 GAIT DISTURBANCE: ICD-10-CM

## 2025-06-17 DIAGNOSIS — R29.898 LEFT LEG WEAKNESS: ICD-10-CM

## 2025-06-17 DIAGNOSIS — Z78.9 DIFFICULTY NAVIGATING STAIRS: ICD-10-CM

## 2025-06-17 NOTE — PROGRESS NOTES
Physical Therapy Daily Treatment Note      Patient: Fanny Winchester   : 1965  Referring practitioner: Isaiah Meadows MD  Date of Initial Visit: Type: THERAPY  Noted: 2025  Today's Date:  2025  Patient seen for 8 sessions         Fanny Winchester reports: 2/10 at medial knee but sleeping better and moving better. Did not get to go on her beach trip.               Objective     L knee    Flexion: 115 degrees AAROM  Flexion: 110 degrees AROM    Ext: 6 degrees at rest; 2-3 degrees AROM      See Exercise, Manual, and Modality Logs for complete treatment.       Assessment/Plan  Pt is 4 weeks s/p L TKA. Progressed from standing WS to standing cup walks with increased balance challenge cues for knee flexion on LLE; noted BLE varrus. Improving antalgia with improving gait mechanics. Pt still doing ROMtech with resistance. Improving knee ext with LAQ and SAQ with decreasing ext lag. Improving knee ext ROM but still lacking TKE. Ice at end of session.       Progress per Plan of Care and Progress strengthening /stabilization /functional activity           Timed:  Manual Therapy:    5     mins  94918;  Therapeutic Exercise:    15     mins  28972;     Neuromuscular Patricia:    -    mins  18329;    Therapeutic Activity:     10     mins  02442;     Gait Training:      -     mins  29514;     Ultrasound:     -     mins  27692;      Untimed:  Electrical Stimulation:    -     mins  00341 ( );  Mechanical Traction:    -     mins  32257;   Dry needling:      -     mins  43285/    Timed Treatment:   30   mins   Total Treatment:     40   mins  Kristan Wang PT  Physical Therapist    License #: [id number]

## 2025-06-19 ENCOUNTER — TREATMENT (OUTPATIENT)
Dept: PHYSICAL THERAPY | Facility: CLINIC | Age: 60
End: 2025-06-19
Payer: COMMERCIAL

## 2025-06-19 DIAGNOSIS — R29.898 LEFT LEG WEAKNESS: ICD-10-CM

## 2025-06-19 DIAGNOSIS — Z78.9 DIFFICULTY NAVIGATING STAIRS: ICD-10-CM

## 2025-06-19 DIAGNOSIS — R26.9 GAIT DISTURBANCE: ICD-10-CM

## 2025-06-19 DIAGNOSIS — M25.562 ACUTE PAIN OF LEFT KNEE: ICD-10-CM

## 2025-06-19 DIAGNOSIS — Z96.652 STATUS POST LEFT KNEE REPLACEMENT: Primary | ICD-10-CM

## 2025-06-19 DIAGNOSIS — M25.662 DECREASED RANGE OF MOTION (ROM) OF LEFT KNEE: ICD-10-CM

## 2025-06-19 NOTE — PROGRESS NOTES
Physical Therapy Progress Note/Re-certification      Patient: Fanny Winchester   : 1965  Referring practitioner: Isaiah Meadows MD  Date of Initial Visit: Type: THERAPY  Noted: 2025  Today's Date:  2025  Patient seen for 9 sessions         Fanny Winchester reports: L knee pain 2/10 today; trying to just use tylenol vs pain med. Sleep disturbed.        LEFS: 34/80       Objective          Tenderness   Left Knee   Tenderness in the inferior fat pad, inferior patella, lateral joint line, medial joint line and patellar tendon.     Active Range of Motion   Left Knee   Flexion: 107 degrees   Extension: 2 degrees   Extensor la degrees     Passive Range of Motion   Left Knee   Flexion: 112 degrees with pain  Extension: 0 degrees     Strength/Myotome Testing     Left Knee   Flexion: 4  Extension: 4  Quadriceps contraction: fair    Swelling     Left Knee Girth Measurement (cm)   Joint line: 38 cm    Functional Assessment     Single Leg Stance   Left: 5 seconds      See Exercise, Manual, and Modality Logs for complete treatment.       Assessment & Plan       Assessment  Impairments: abnormal gait, abnormal muscle firing, abnormal or restricted ROM, activity intolerance, impaired balance, impaired physical strength, lacks appropriate home exercise program, pain with function and weight-bearing intolerance   Functional limitations: carrying objects, sleeping, walking, uncomfortable because of pain, moving in bed, sitting, standing, stooping and unable to perform repetitive tasks   Assessment details: Fanny Winchester is a 59 y.o. year-old female referred to physical therapy s/p L TKA on 25; just over 4 weeks post op. She presents with a stable clinical presentation.  She has comorbidities RA/OA and personal factors of living by herself that may affect her progress in the plan of care. Pt with sig improvement in ROM, strength, stability of L knee and functional mobility progressed from Rwx to cane to  no AD with mild antalgia. Pt progressing well with flexion but continued tightness with knee ext with noted ext lag. Patient is appropriate for skilled physical therapy in order to reduce pain and increase ease with daily mobility. During evaluation, pt educated on anatomy, goal of interventions, positioning, HEP, ice, and body mechanics to promote healthy lifestyle and improve quality of life.  Prognosis: good    Goals  Plan Goals: STG: ~6 weeks  1. Pt will be demonstrate 0-90 degrees of knee extension and flexion to be able to sit in a chair properly-PROGRESSING-still lacking ext  2. Pt will demonstrate a SLR without extensor lag to improve terminal knee extension-PROGRESSING  3. Pt will walk into the clinic without antalgic gait pattern without AD-PROGRESSING  4. Decrease swelling at L joint line from 39 cm to 35 cm or less-PROGRESSING    LTG: ~12 weeks  1. Pt will be demonstrate 0-120 degrees of knee extension and flexion to be able to ascend/descend stairs-PROGRESSING  2. Pt will improve LEFS score to 50/80 to improve subjective function of LE with ADLs-PROGRESSING  3. Pt will be able to walk/stand for 20 minutes with tolerable knee pain -MET  4. Pt will improve knee extensor and knee flexor strength to 5/5 to be able to complete transfers without UE assistance-PROGRESSING  5. Pt will be able to bend and  kids from floor level to be able to return to work-NT  6. 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      Plan  Therapy options: will be seen for skilled therapy services  Planned modality interventions: cryotherapy, electrical stimulation/Russian stimulation, thermotherapy (hydrocollator packs), ultrasound, dry needling and TENS  Planned therapy interventions: ADL retraining, balance/weight-bearing training, body mechanics training, flexibility, functional ROM exercises, gait training, home exercise program, joint mobilization, manual therapy,  neuromuscular re-education, postural training, soft tissue mobilization, spinal/joint mobilization, strengthening, stretching and therapeutic activities  Treatment plan discussed with: patient (daughter present)  Plan details: 2 times per week 8 weeks             Timed:  Manual Therapy:    5     mins  59181;  Therapeutic Exercise:    20     mins  55607;     Neuromuscular Patricia:    -    mins  46597;    Therapeutic Activity:     10     mins  33664;     Gait Training:      -     mins  03431;     Ultrasound:     -     mins  02216;      Untimed:  Electrical Stimulation:    -     mins  32868 ( );  Mechanical Traction:    -     mins  09731;   Dry needling:      -     mins  15787/20561    Timed Treatment:   35   mins   Total Treatment:     45   mins  Kristan Wang PT  Physical Therapist    License #: [id number]

## 2025-06-24 ENCOUNTER — TREATMENT (OUTPATIENT)
Dept: PHYSICAL THERAPY | Facility: CLINIC | Age: 60
End: 2025-06-24
Payer: COMMERCIAL

## 2025-06-24 DIAGNOSIS — Z78.9 DIFFICULTY NAVIGATING STAIRS: ICD-10-CM

## 2025-06-24 DIAGNOSIS — Z96.652 STATUS POST LEFT KNEE REPLACEMENT: Primary | ICD-10-CM

## 2025-06-24 DIAGNOSIS — M25.562 ACUTE PAIN OF LEFT KNEE: ICD-10-CM

## 2025-06-24 DIAGNOSIS — M25.662 DECREASED RANGE OF MOTION (ROM) OF LEFT KNEE: ICD-10-CM

## 2025-06-24 DIAGNOSIS — R29.898 LEFT LEG WEAKNESS: ICD-10-CM

## 2025-06-24 DIAGNOSIS — R26.9 GAIT DISTURBANCE: ICD-10-CM

## 2025-06-24 PROCEDURE — 97530 THERAPEUTIC ACTIVITIES: CPT | Performed by: PHYSICAL THERAPIST

## 2025-06-24 PROCEDURE — 97110 THERAPEUTIC EXERCISES: CPT | Performed by: PHYSICAL THERAPIST

## 2025-06-24 NOTE — PROGRESS NOTES
Physical Therapy Daily Treatment Note      Patient: Fanny Winchester   : 1965  Referring practitioner: Isaiah Meadows MD  Date of Initial Visit: Type: THERAPY  Noted: 2025  Today's Date:  2025  Patient seen for 10 sessions         Fanny Winchester reports: I have a new pain in the back of my calf radiating to my toes; possibly from new exercises. Did a lot more sitting over the weekend; driving, [employer] and mowing               Objective     L knee ext at rest: 4-5 degrees  L knee ext with AROM: 1-2 degrees    L knee flexion: 115 degrees    See Exercise, Manual, and Modality Logs for complete treatment.       Assessment/Plan  Pt with increased soreness in L calf after last session; added calf stretch off edge of step with relief. Negative Gucci's but neural tension down LLE. No redness, tenderness to palpation or increased temperature in L calf. Likely some posterior knee/leg pain due to increased knee ext. Ice at end of session to decrease pain and tenderness at L knee.        Progress per Plan of Care and Progress strengthening /stabilization /functional activity           Timed:  Manual Therapy:    5     mins  13620;  Therapeutic Exercise:    15     mins  34212;     Neuromuscular Patricia:    -    mins  68615;    Therapeutic Activity:     10     mins  69136;     Gait Training:      -     mins  93828;     Ultrasound:     -     mins  26705;      Untimed:  Electrical Stimulation:    -     mins  57305 ( );  Mechanical Traction:    -     mins  19244;   Dry needling:      -     mins  10085/    Timed Treatment:   30   mins   Total Treatment:     40   mins  Kristan Wang PT  Physical Therapist    License #: [id number]

## 2025-06-25 DIAGNOSIS — Z96.652 S/P TKR (TOTAL KNEE REPLACEMENT), LEFT: ICD-10-CM

## 2025-06-25 RX ORDER — OXYCODONE AND ACETAMINOPHEN 5; 325 MG/1; MG/1
1 TABLET ORAL EVERY 4 HOURS PRN
Qty: 30 TABLET | Refills: 0 | Status: SHIPPED | OUTPATIENT
Start: 2025-06-25

## 2025-06-25 NOTE — TELEPHONE ENCOUNTER
Rx Refill Note  Requested Prescriptions     Pending Prescriptions Disp Refills    oxyCODONE-acetaminophen (PERCOCET) 5-325 MG per tablet 30 tablet 0     Sig: Take 1 tablet by mouth Every 4 (Four) Hours As Needed for Severe Pain.      Last office visit with prescribing clinician: 1/10/2025      Next office visit with prescribing clinician: 7/11/2025   Last Filled: 6/13/2025    Encounter Diagnosis   Name Primary?    S/P TKR (total knee replacement), left       Date of Surgery: 5/21/2025      Annette Barbosa MA  06/25/25, 09:19 EDT    Previous RX pended for your approval, change or denial.     {TIP  Encounters:    {TIP  Please add Last Relevant Lab Date if appropriate:  {TIP  Is Refill Pharmacy correct?:

## 2025-06-26 ENCOUNTER — TREATMENT (OUTPATIENT)
Dept: PHYSICAL THERAPY | Facility: CLINIC | Age: 60
End: 2025-06-26
Payer: COMMERCIAL

## 2025-06-26 DIAGNOSIS — R29.898 LEFT LEG WEAKNESS: ICD-10-CM

## 2025-06-26 DIAGNOSIS — M25.562 ACUTE PAIN OF LEFT KNEE: ICD-10-CM

## 2025-06-26 DIAGNOSIS — Z96.652 STATUS POST LEFT KNEE REPLACEMENT: Primary | ICD-10-CM

## 2025-06-26 DIAGNOSIS — M25.662 DECREASED RANGE OF MOTION (ROM) OF LEFT KNEE: ICD-10-CM

## 2025-06-26 DIAGNOSIS — Z78.9 DIFFICULTY NAVIGATING STAIRS: ICD-10-CM

## 2025-06-26 DIAGNOSIS — R26.9 GAIT DISTURBANCE: ICD-10-CM

## 2025-06-26 NOTE — PROGRESS NOTES
Physical Therapy Daily Treatment Note      Patient: Fanny Winchester   : 1965  Referring practitioner: Isaiah Meadows MD  Date of Initial Visit: Type: THERAPY  Noted: 2025  Today's Date:  2025  Patient seen for 11 sessions         Fanny Winchester reports: feels like she turned a corner; went to the grocery store and used a cart vs riding cart.               Objective       L knee ext AROM: 0 degrees -after stretching and manual tx  L knee flexion AAROM: 116 degrees  L knee flexion AROM: 110 degrees    See Exercise, Manual, and Modality Logs for complete treatment.       Assessment/Plan  Improving nerve pain down calf and less patellar pain today. Improving L knee ROM, stability and strength. Improving activity tolerance and gait mechanics. Most manual tx focused on extension ROM and ended with ice.        Progress per Plan of Care and Progress strengthening /stabilization /functional activity           Timed:  Manual Therapy:    5     mins  09306;  Therapeutic Exercise:    25     mins  53698;     Neuromuscular Patricia:    -    mins  60964;    Therapeutic Activity:     15     mins  73602;     Gait Training:      -     mins  93547;     Ultrasound:     -     mins  61597;      Untimed:  Electrical Stimulation:    -     mins  61738 ( );  Mechanical Traction:    -     mins  66059;   Dry needling:      -     mins  68297/    Timed Treatment:   45   mins   Total Treatment:     55   mins  Kristan Wang PT  Physical Therapist    License #: [id number]

## 2025-07-01 ENCOUNTER — TELEPHONE (OUTPATIENT)
Dept: ORTHOPEDIC SURGERY | Facility: CLINIC | Age: 60
End: 2025-07-01
Payer: COMMERCIAL

## 2025-07-01 DIAGNOSIS — Z96.652 S/P TKR (TOTAL KNEE REPLACEMENT), LEFT: Primary | ICD-10-CM

## 2025-07-01 NOTE — TELEPHONE ENCOUNTER
PATIENT CALLED OFFICE TODAY, HE INSURANCE HAS CHANGED TO [place] [employer]ICA[employer]. THE PHYSICAL THERAPY OFFICE SHE IS CURRENTLY AT STATES THEY DO NOT TAKE HER NEW INSURANCE. PATIENT STATES SHE CALLED THE PHYSICAL THERAPY AT THE [facility]. DO WE NEED TO CHANGE HER PT ORDER?  PATIENT WAS UNSURE IF SHE STILL NEEDED TO CONTINUE PHYSICAL THERAPY AND WANTED TO KNOW WHAT DR WHITLOCK SUGGESTED.

## 2025-07-08 ENCOUNTER — OFFICE VISIT (OUTPATIENT)
Dept: ORTHOPEDIC SURGERY | Facility: CLINIC | Age: 60
End: 2025-07-08
Payer: COMMERCIAL

## 2025-07-08 VITALS — WEIGHT: 182 LBS | BODY MASS INDEX: 32.25 KG/M2 | HEIGHT: 63 IN

## 2025-07-08 DIAGNOSIS — Z96.652 S/P TKR (TOTAL KNEE REPLACEMENT), LEFT: Primary | ICD-10-CM

## 2025-07-08 PROCEDURE — 99024 POSTOP FOLLOW-UP VISIT: CPT | Performed by: INTERNAL MEDICINE

## 2025-07-08 RX ORDER — CEPHALEXIN 500 MG/1
500 CAPSULE ORAL 2 TIMES DAILY
Qty: 10 CAPSULE | Refills: 0 | Status: SHIPPED | OUTPATIENT
Start: 2025-07-08 | End: 2025-07-15

## 2025-07-08 NOTE — PROGRESS NOTES
Subjective:     Patient ID: Fanny Winchester is a 59 y.o. female.    Chief Complaint:    History of Present Illness  Fanny Winchester returns to clinic today for evaluation of left knee status post total knee arthroplasty performed by myself 7 weeks ago.  She is attending physical therapy and progressing well.  The patient denies any fevers chills or drainage from their surgical incision. She is happy with the result so far. She states that the pain and swelling are improving.  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    DDD (degenerative disc disease), lumbar 03/28/2022    Fibromyalgia     Fibromyalgia, primary     GERD (gastroesophageal reflux disease)     Low back pain     Osteoarthritis     Rheumatoid arthritis involving multiple sites      Past Surgical History:   Procedure Laterality Date    COLONOSCOPY N/A 12/22/2020    Procedure: COLONOSCOPY with polypectomy;  Surgeon: Adarsh Arredondo MD;  Location: [facility] OR;  Service: Gastroenterology;  Laterality: N/A;  Rectal polyp    COLONOSCOPY  12/22/2020    ENDOSCOPY N/A 12/22/2020    Procedure: ESOPHAGOGASTRODUODENOSCOPY with biopsies;  Surgeon: Adarsh Arredondo MD;  Location: [facility] OR;  Service: Gastroenterology;  Laterality: N/A;  Reflux esophagitis  Gastritis  Duodenitis  Hiatal hernia  Biopsies: distal esophagus, gastric, duodenum    GANGLION CYST EXCISION Left 2019    HYSTERECTOMY  2000    OOPHORECTOMY Right 11/2020    TOTAL KNEE ARTHROPLASTY Left 05/21/2025    Procedure: TOTAL KNEE ARTHROPLASTY WITH CORI ROBOT;  Surgeon: Isaiah Meadows  "MD;  Location: [facility];  Service: Robotics - Ortho;  Laterality: Left;    TUMOR REMOVAL      ULNAR NERVE TRANSPOSITION Left 2019       Family History   Problem Relation Age of Onset    Heart disease Father     Hypertension Father     Hyperlipidemia Father     Alcohol abuse Father     Diabetes Father     Heart disease Mother     Lupus Mother     Colon cancer Maternal Aunt     Colon polyps Maternal Aunt     Breast cancer Neg Hx     Ovarian cancer Neg Hx                  Objective:  Vitals:    07/08/25 0834   Weight: 82.6 kg (182 lb)   Height: 160 cm (63\")         07/08/25 0834   Weight: 82.6 kg (182 lb)     Body mass index is 32.24 kg/m².           Left Knee Exam     Range of Motion   Extension:  5   Flexion:  120     Tests   Varus: negative Valgus: negative  Lachman:  Anterior - negative    Posterior - negative  Drawer:  Anterior - negative     Posterior - negative    Other   Erythema: absent  Scars: present  Sensation: normal  Pulse: present  Swelling: mild  Effusion: no effusion present               Imaging: no new    Assessment:        1. S/P TKR (total knee replacement), left           Plan:          Discussed treatment options at length with patient at today's visit. I discussed with the patient that they are doing well from my standpoint.  The patient is ambulatory today with no limp.  I discussed with them that it is important to continue working on strengthening and range of motion exercises.  They should continue to attend physical therapy until they are discharged by the therapist or until they feel like they are no longer making improvements.  I discussed with them that it is normal to have stiffness achiness and pain particularly at night and in the morning.  This will continue to improve as time goes on and may continue to improve for 6 to 12 months postoperatively. I offered the patient a narcotic refill.  The patient's surgical incision is healed without signs of infection.  It is now okay for the " patient to soak or submerge the surgical incision underwater.  They should clean the incision daily with soapy water in the shower.  I would like the patient to notify our office immediately if they note any increasing redness, pain, fevers, chills, or drainage of any kind from their surgical incision as this would be abnormal at this point in time.  I offered the patient a narcotic refill.  They may discontinue the anticoagulant from my standpoint unless prescribed by another provider prior to surgery.  I would like to see the patient back in 6 weeks for 12-week follow-up appointment.  The patient voiced understanding and agreement with the plan.   Follow up: 6 weeks with 2 views of the left knee      Fanny Winchester was in agreement with plan and had all questions answered.     Medications:  No orders of the defined types were placed in this encounter.      Followup:  No follow-ups on file.    Diagnoses and all orders for this visit:    1. S/P TKR (total knee replacement), left (Primary)          Dictated utilizing Dragon dictation

## 2025-07-10 ENCOUNTER — HOSPITAL ENCOUNTER (OUTPATIENT)
Dept: PHYSICAL THERAPY | Facility: HOSPITAL | Age: 60
Setting detail: THERAPIES SERIES
Discharge: HOME | End: 2025-07-10
Payer: COMMERCIAL

## 2025-07-10 ENCOUNTER — TELEPHONE (OUTPATIENT)
Dept: ORTHOPEDIC SURGERY | Facility: CLINIC | Age: 60
End: 2025-07-10
Payer: COMMERCIAL

## 2025-07-10 DIAGNOSIS — Z96.652 STATUS POST LEFT KNEE REPLACEMENT: Primary | ICD-10-CM

## 2025-07-10 PROCEDURE — 97161 PT EVAL LOW COMPLEX 20 MIN: CPT | Performed by: PHYSICAL THERAPIST

## 2025-07-10 NOTE — TELEPHONE ENCOUNTER
Patient called to let us know she wants to have her right knee surgery on 11/19/2025 if at all possible.

## 2025-07-10 NOTE — TELEPHONE ENCOUNTER
SPOKE WITH PATIENT AND LET HER KNOW THAT WE WOULD NEED TO SEE HER IN OFFICE FOR HER RIGHT KNEE. SHE ASKED IF WE COULD ADD HER RIGHT KNEE SURGERY CONSULT TO HER APPT ON THE 8/22. I TOLD HER THAT WE COULD, AND I ALSO MADE A NOTE THAT SHE IS WANTING IT HER SURGERY ON 11/19, SO SHE WILL HAVE A SPOT. I LET HER KNOW THAT ONCE HER SURGERY WAS ORDERED, I WILL START GETTING EVERYTHING GOING FOR HER.     PATIENT VERBALLY UNDERSTOOD.

## 2025-07-10 NOTE — THERAPY EVALUATION
Outpatient Physical Therapy Ortho Initial Evaluation   Jason     Patient Name: Fanny Winchester  : 1965  MRN: [id number]  Today's Date: 7/10/2025      Visit Date: 07/10/2025    Patient Active Problem List   Diagnosis    Gastroesophageal reflux disease with esophagitis without hemorrhage    Fibromyalgia    Rheumatoid arthritis    DDD (degenerative disc disease), lumbar    Primary osteoarthritis of both knees    Elevated fasting glucose    Mixed hyperlipidemia    LGSIL Pap smear of vagina    Primary osteoarthritis of left knee    OA (osteoarthritis) of knee    S/P TKR (total knee replacement), left        Past Medical History:   Diagnosis Date    Anxiety     Mason tumor 2020    ovary    DDD (degenerative disc disease), lumbar 2022    Fibromyalgia     Fibromyalgia, primary     GERD (gastroesophageal reflux disease)     Low back pain     Osteoarthritis     Rheumatoid arthritis involving multiple sites         Past Surgical History:   Procedure Laterality Date    COLONOSCOPY N/A 2020    Procedure: COLONOSCOPY with polypectomy;  Surgeon: Adarsh Arredondo MD;  Location: [facility];  Service: Gastroenterology;  Laterality: N/A;  Rectal polyp    COLONOSCOPY  2020    ENDOSCOPY N/A 2020    Procedure: ESOPHAGOGASTRODUODENOSCOPY with biopsies;  Surgeon: Adarsh Arredondo MD;  Location: [facility] OR;  Service: Gastroenterology;  Laterality: N/A;  Reflux esophagitis  Gastritis  Duodenitis  Hiatal hernia  Biopsies: distal esophagus, gastric, duodenum    GANGLION CYST EXCISION Left     HYSTERECTOMY  2000    OOPHORECTOMY Right 2020    TOTAL KNEE ARTHROPLASTY Left 2025    Procedure: TOTAL KNEE ARTHROPLASTY WITH CORI ROBOT;  Surgeon: Isaiah Meadows MD;  Location: [facility] OR;  Service: Robotics - Ortho;  Laterality: Left;    TUMOR REMOVAL      ULNAR NERVE TRANSPOSITION Left 2019       Visit Dx:     ICD-10-CM ICD-9-CM   1. Status post left knee replacement   Z96.652 V43.65          Patient History       Row Name 07/10/25 1400             History    Chief Complaint Pain;Difficulty with daily activities;Difficulty Walking;Muscle weakness;Joint stiffness  -SP      Type of Pain Knee pain  left  -SP      Date Current Problem(s) Began 05/21/25  -SP      Brief Description of Current Complaint Patient reports chronic history of left knee pain that failed to improve with conservative measures.   She is s/p left total knee replacement 5-21-25 and has been participating in outpatient PT at [facility], however due to a change in her insurance, she is transferring to this facility.  Patient reports that did not attend PT last week but have been consistent with her HEP.  Patient reports that she hopes to return to her job at   [employer] [place] in August  -SP      Previous treatment for THIS PROBLEM Injections;Medication;Surgery;Rehabilitation  -SP      Surgery Date: 05/21/25  -SP      Patient/Caregiver Goals Relieve pain;Return to work;Return to prior level of function;Improve mobility;Improve strength;Know what to do to help the symptoms;Decrease swelling  -SP      Patient's Rating of General Health Good  -SP      Hand Dominance right-handed  -SP      Occupation/sports/leisure activities works in [employer] facilty  -SP      What clinical tests have you had for this problem? X-ray  -SP         Pain     Pain Location Knee  -SP      Pain at Present 2  -SP      Pain at Worst 3  -SP      Pain Frequency Intermittent  -SP      Pain Description Aching;Tightness  -SP      What Performance Factors Make the Current Problem(s) WORSE? increased weight bearing, end ranges of motion, cannot sleep well, stand after prolonge sit or rest  -SP      What Performance Factors Make the Current Problem(s) BETTER? rest, ice  -SP      Tolerance Time- Standing limited <10 min  -SP      Tolerance Time- Sitting limited  -SP      Tolerance Time- Walking limited 25 min  -SP      Is your sleep disturbed? Yes  -SP       Difficulties at work? currently off work  -SP      Difficulties with ADL's? limited tolerance for weight bearing activiy, difficulty with transfers and ambulation on unlevel surfaces  -SP         Fall Risk Assessment    Any falls in the past year: No  -SP         Daily Activities    Primary Language English  -SP      Are you able to read Yes  -SP      Are you able to write Yes  -SP      How does patient learn best? Listening;Reading;Demonstration;Pictures/Video  -SP      Teaching needs identified Home Exercise Program;Management of Condition  -SP      Patient is concerned about/has problems with Performing home management (household chores, shopping, care of dependents);Performing job responsibilities/community activities (work, school,;Transfers (getting out of a chair, bed);Walking;Standing  -SP      Barriers to learning None  -SP      Pt Participated in POC and Goals Yes  -SP         Safety    Are you being hurt, hit, or frightened by anyone at home or in your life? No  -SP      Are you being neglected by a caregiver No  -SP                User Key  (r) = Recorded By, (t) = Taken By, (c) = Cosigned By      Initials Name Provider Type    SP Rebecca Armstrong, PT Physical Therapist                     PT Ortho       Row Name 07/10/25 1400       Posture/Observations    Observations Edema;Incision healing;Muscle atrophy  -SP    Posture/Observations Comments mild edema present surrounding left knee;  left knee surgical incision well healed with good closure  -SP       Patellar Accessory Motions    Superior glide Left:;Hypomobile  -SP    Inferior glide Left:;Hypomobile  -SP    Medial glide Left:;Hypomobile  -SP    Lateral glide Left:;Hypomobile  -SP       Knee Special Tests    Gucci’s sign (DVT) Negative  -SP       Left Lower Ext    Lt Hip ABduction AROM WFL  -SP    Lt Hip ADduction AROM WFL  -SP    Lt Hip Extension AROM WFL  -SP    Lt Hip Flexion AROM WFL  -SP    Lt Knee Extension/Flexion AROM 0-  degrees with complaints of pain at end range  -SP    Lt Knee Extension/Flexion PROM 0-6-122 degrees  -SP    Lt Ankle Dorsiflexion AROM WFL  -SP    Lt Ankle Plantarflexion AROM WFL  -SP       MMT Left Lower Ext    Lt Hip Flexion MMT, Gross Movement (4-/5) good minus  -SP    Lt Hip Extension MMT, Gross Movement (4-/5) good minus  -SP    Lt Hip ABduction MMT, Gross Movement (4-/5) good minus  -SP    Lt Hip ADduction MMT, Gross Movement (4/5) good  -SP    Lt Knee Extension MMT, Gross Movement (3+/5) fair plus  -SP    Lt Knee Flexion MMT, Gross Movement (3+/5) fair plus  -SP    Lt Ankle Plantarflexion MMT, Gross Movement (4-/5) good minus  -SP    Lt Ankle Dorsiflexion MMT, Gross Movement (4-/5) good minus  -SP       Sensation    Sensation WNL? WFL  -SP       Lower Extremity Flexibility    Hamstrings Left:;Moderately limited  -SP    Hip Flexors Left:;Moderately limited  -SP    Gastrocnemius Left:;Moderately limited  -SP       Transfers    Bed-Chair [place] (Transfers) independent  -SP    Chair-Bed [place] (Transfers) independent  -SP    Sit-Stand [place] (Transfers) independent  -SP    Stand-Sit [place] (Transfers) independent  -SP    Comment, (Transfers) Patient uses UEs to assist with transfers sit/stand  -SP       Gait/Stairs (Locomotion)    [place] Level (Gait) independent  -SP    Pattern (Gait) swing-through  -SP    Deviations/Abnormal Patterns (Gait) antalgic;base of support, wide;iggy decreased;gait speed decreased;stride length decreased  -SP    Left Sided Gait Deviations heel strike decreased  -SP    Ascending Technique (Stairs) step-to-step  -SP    Descending Technique (Stairs) step-to-step  -SP    Comment, (Gait/Stairs) Patient ambulates with antalgic gait pattern with exagerated lateral trunk shift.  -SP              User Key  (r) = Recorded By, (t) = Taken By, (c) = Cosigned By      Initials Name Provider Type    Rebecca Devries, PT Physical Therapist                                 Therapy Education  Given: HEP  Program: Reinforced  How Provided: Verbal  Provided to: Patient  Level of Understanding: Verbalized, Demonstrated      PT OP Goals       Row Name 07/10/25 1500          PT Short Term Goals    STG Date to Achieve 07/25/25  -SP     STG 1 Patient demonstrates left knee AROM knee extension 0-5 degrees  -SP     STG 2 Patient demonstrates left knee AROM flexion to >120 degrees  -SP     STG 3 Patient ambulates stairs with normal reciprocal pattern  -SP        Long Term Goals    LTG Date to Achieve 08/09/25  -SP     LTG 1 Patient demonstrates left LE strength increased by one muscle grade to better tolerate functional mobility  -SP     LTG 2 Patient tolerates community distance mobility on level and unlevel surfaces with pain level 0-1/10  -SP     LTG 3 Patient transfers in/out vehicle and sit/stand without increased pain or compensation  -SP     LTG 4 Patient independent with HEP  -SP        Time Calculation    PT Goal Re-Cert Due Date 08/09/25  -SP               User Key  (r) = Recorded By, (t) = Taken By, (c) = Cosigned By      Initials Name Provider Type    Rebecca Devries, PT Physical Therapist                     PT Assessment/Plan       Row Name 07/10/25 1511          PT Assessment    Functional Limitations Impaired gait;Limitation in home management;Limitations in community activities;Limitations in functional capacity and performance;Performance in leisure activities;Performance in self-care ADL;Performance in work activities  -SP     Assessment Comments Patient with chronic left knee pain that failed to improve with conservative measures.  Patient is s/p left TKR 5-21-25. She has been participating in outpatient PT but is transferring to this clinic due to changes in her insurance.  Patient presents with pain/stiffness, decreased ROM,  decreased strength, altered gait and transfers and difficulty performing home, work and community mobility and ADLs  -SP      Please refer to paper survey for additional self-reported information Yes  -SP     Rehab Potential Good  -SP     Patient/caregiver participated in establishment of treatment plan and goals Yes  -SP     Patient would benefit from skilled therapy intervention Yes  -SP        PT Plan    PT Frequency 2x/week;1x/week  -SP     Predicted Duration of Therapy Intervention (PT) 4 weeks  -SP     Planned CPT's? PT EVAL LOW COMPLEXITY: 41342;PT THER PROC EA 15 MIN: 15321;PT MANUAL THERAPY EA 15 MIN: 81987;PT HOT OR COLD PACK TREAT MCARE;PT ELECTRICAL STIM UNATTEND:   -SP               User Key  (r) = Recorded By, (t) = Taken By, (c) = Cosigned By      Initials Name Provider Type    Rebecca Devries, PT Physical Therapist                     Modalities       Row Name 07/10/25 1500             Ice    Ice Applied Yes  -SP      Location left knee  -SP      PT Ice Rx Minutes 10  -SP      Ice S/P Rx Yes  -SP                User Key  (r) = Recorded By, (t) = Taken By, (c) = Cosigned By      Initials Name Provider Type    Rebecca Devries, PT Physical Therapist                   OP Exercises       Row Name 07/10/25 1500             Exercise 1    Exercise Name 1 heel slides with sheet  -SP      Time 1 6 min  -SP         Exercise 2    Exercise Name 2 wall slide  -SP      Time 2 6 min  -SP         Exercise 3    Exercise Name 3 cycle seat 5  -SP      Time 3 6 min  -SP         Exercise 4    Exercise Name 4 heel raises  -SP         Exercise 5    Exercise Name 5 step ups  -SP         Exercise 6    Exercise Name 6 squats  -SP         Exercise 7    Exercise Name 7 SAQ  -SP         Exercise 8    Exercise Name 8 SLR  -SP         Exercise 9    Exercise Name 9 sidelying hip abduction  -SP         Exercise 10    Exercise Name 10 quad set knee/ankle  -SP      Reps 10 15/15  -SP      Time 10 5 sec  -SP         Exercise 11    Exercise Name 11 hamstring stretch  -SP      Reps 11 15  -SP      Time 11 10 sec  -SP          Exercise 12    Exercise Name 12 heel prop  -SP                User Key  (r) = Recorded By, (t) = Taken By, (c) = Cosigned By      Initials Name Provider Type    Rebecca Devries, PT Physical Therapist                  Manual Rx (Last 36 Hours)       Manual Treatments       Row Name 07/10/25 1500             Manual Rx 1    Manual Rx 1 Location left knee  -SP      Manual Rx 1 Type PROM knee flexion stretch in sitting  -SP      Manual Rx 1 Duration 10 x 10 sec  -SP         Manual Rx 2    Manual Rx 2 Location left knee  -SP      Manual Rx 2 Type posterior femoral glide mobilization  -SP      Manual Rx 2 Duration 2-3 min  -SP                User Key  (r) = Recorded By, (t) = Taken By, (c) = Cosigned By      Initials Name Provider Type    Rebecca Devries, PT Physical Therapist                                Outcome Measure Options: Lower Extremity Functional Scale (LEFS)  Lower Extremity Functional Index  Any of your usual work, housework or school activities: A little bit of difficulty  Your usual hobbies, recreational or sporting activities: Quite a bit of difficulty  Getting into or out of the bath: No difficulty  Walking between rooms: A little bit of difficulty  Putting on your shoes or socks: A little bit of difficulty  Squatting: Quite a bit of difficulty  Lifting an object, like a bag of groceries from the floor: No difficulty  Performing light activities around your home: A little bit of difficulty  Performing heavy activities around your home: Moderate difficulty  Getting into or out of a car: A little bit of difficulty  Walking 2 blocks: Extreme difficulty or unable to perform activity  Walking a mile: Extreme difficulty or unable to perform activity  Going up or down 10 stairs (about 1 flight of stairs): Quite a bit of difficulty  Standing for 1 hour: Extreme difficulty or unable to perform activity  Sitting for 1 hour: No difficulty  Running on even ground: Extreme difficulty or unable  to perform activity  Running on uneven ground: Extreme difficulty or unable to perform activity  Making sharp turns while running fast: Extreme difficulty or unable to perform activity  Hopping: Extreme difficulty or unable to perform activity  Rolling over in bed: A little bit of difficulty  Total: 35  Lower Extremity Functional Index  Any of your usual work, housework or school activities: A little bit of difficulty  Your usual hobbies, recreational or sporting activities: Quite a bit of difficulty  Getting into or out of the bath: No difficulty  Walking between rooms: A little bit of difficulty  Putting on your shoes or socks: A little bit of difficulty  Squatting: Quite a bit of difficulty  Lifting an object, like a bag of groceries from the floor: No difficulty  Performing light activities around your home: A little bit of difficulty  Performing heavy activities around your home: Moderate difficulty  Getting into or out of a car: A little bit of difficulty  Walking 2 blocks: Extreme difficulty or unable to perform activity  Walking a mile: Extreme difficulty or unable to perform activity  Going up or down 10 stairs (about 1 flight of stairs): Quite a bit of difficulty  Standing for 1 hour: Extreme difficulty or unable to perform activity  Sitting for 1 hour: No difficulty  Running on even ground: Extreme difficulty or unable to perform activity  Running on uneven ground: Extreme difficulty or unable to perform activity  Making sharp turns while running fast: Extreme difficulty or unable to perform activity  Hopping: Extreme difficulty or unable to perform activity  Rolling over in bed: A little bit of difficulty  Total: 35      Time Calculation:     Start Time: 1100  Stop Time: 1215  Time Calculation (min): 75 min  Untimed Charges  PT Eval/Re-eval Minutes: 60  PT Ice Rx Minutes: 10  Total Minutes  Untimed Charges Total Minutes: 60   Total Minutes: 60     Therapy Charges for Today       Code Description Service  Date Service Provider Modifiers Qty    58575405741 HC PT EVAL LOW COMPLEXITY 4 7/10/2025 Rebecca Armstrong, PT GP 1            PT G-Codes  Outcome Measure Options: Lower Extremity Functional Scale (LEFS)  Total: 35         Rebecca Armstrong, PT  7/10/2025

## 2025-07-15 ENCOUNTER — HOSPITAL ENCOUNTER (OUTPATIENT)
Dept: PHYSICAL THERAPY | Facility: HOSPITAL | Age: 60
Setting detail: THERAPIES SERIES
Discharge: HOME | End: 2025-07-15
Payer: COMMERCIAL

## 2025-07-15 DIAGNOSIS — Z96.652 STATUS POST LEFT KNEE REPLACEMENT: Primary | ICD-10-CM

## 2025-07-15 PROCEDURE — 97110 THERAPEUTIC EXERCISES: CPT | Performed by: PHYSICAL THERAPIST

## 2025-07-15 PROCEDURE — 97140 MANUAL THERAPY 1/> REGIONS: CPT | Performed by: PHYSICAL THERAPIST

## 2025-07-15 NOTE — THERAPY TREATMENT NOTE
Outpatient Physical Therapy Ortho Treatment Note   Jason     Patient Name: Fanny Winchester  : 1965  MRN: [id number]  Today's Date: 7/15/2025      Visit Date: 07/15/2025    Visit Dx:    ICD-10-CM ICD-9-CM   1. Status post left knee replacement  Z96.652 V43.65       Patient Active Problem List   Diagnosis    Gastroesophageal reflux disease with esophagitis without hemorrhage    Fibromyalgia    Rheumatoid arthritis    DDD (degenerative disc disease), lumbar    Primary osteoarthritis of both knees    Elevated fasting glucose    Mixed hyperlipidemia    LGSIL Pap smear of vagina    Primary osteoarthritis of left knee    OA (osteoarthritis) of knee    S/P TKR (total knee replacement), left        Past Medical History:   Diagnosis Date    Anxiety     Mason tumor 2020    ovary    DDD (degenerative disc disease), lumbar 2022    Fibromyalgia     Fibromyalgia, primary     GERD (gastroesophageal reflux disease)     Low back pain     Osteoarthritis     Rheumatoid arthritis involving multiple sites         Past Surgical History:   Procedure Laterality Date    COLONOSCOPY N/A 2020    Procedure: COLONOSCOPY with polypectomy;  Surgeon: Adarsh Arredondo MD;  Location: [facility];  Service: Gastroenterology;  Laterality: N/A;  Rectal polyp    COLONOSCOPY  2020    ENDOSCOPY N/A 2020    Procedure: ESOPHAGOGASTRODUODENOSCOPY with biopsies;  Surgeon: Adarsh Arredondo MD;  Location: [facility];  Service: Gastroenterology;  Laterality: N/A;  Reflux esophagitis  Gastritis  Duodenitis  Hiatal hernia  Biopsies: distal esophagus, gastric, duodenum    GANGLION CYST EXCISION Left     HYSTERECTOMY  2000    OOPHORECTOMY Right 2020    TOTAL KNEE ARTHROPLASTY Left 2025    Procedure: TOTAL KNEE ARTHROPLASTY WITH CORI ROBOT;  Surgeon: Isaiah Meadows MD;  Location: [facility];  Service: Robotics - Ortho;  Laterality: Left;    TUMOR REMOVAL      ULNAR NERVE TRANSPOSITION Left  2019        PT Ortho       Row Name 07/15/25 1500       Subjective    Subjective Comments Patient reports that she her knee feels stiff and she continues to have difficulty sleeping due to knee pain.  -SP              User Key  (r) = Recorded By, (t) = Taken By, (c) = Cosigned By      Initials Name Provider Type    Rebecca Devries, PT Physical Therapist                                 PT Assessment/Plan       Row Name 07/15/25 1612          PT Assessment    Assessment Comments Patient tolerates progression of ther-ex well.  -SP        PT Plan    PT Plan Comments Continue to progress as tolerable  -SP               User Key  (r) = Recorded By, (t) = Taken By, (c) = Cosigned By      Initials Name Provider Type    Rebecca Devries, PT Physical Therapist                     Modalities       Row Name 07/15/25 1500             Ice    Ice Applied Yes  -SP      Location left knee  -SP      PT Ice Rx Minutes 10  -SP      Ice S/P Rx Yes  -SP         Functional Testing    Outcome Measure Options Lower Extremity Functional Scale (LEFS)  -SP                User Key  (r) = Recorded By, (t) = Taken By, (c) = Cosigned By      Initials Name Provider Type    Rebecca Devries, PT Physical Therapist                   OP Exercises       Row Name 07/15/25 1614 07/15/25 1500 07/15/25 1100       Subjective    Subjective Comments -- Patient reports that she her knee feels stiff and she continues to have difficulty sleeping due to knee pain.  -SP --       Total Minutes    35520 - PT Therapeutic Exercise Minutes 15  -SP -- --    97593 - PT Manual Therapy Minutes 15  -SP -- --       Exercise 1    Exercise Name 1 -- -- heel slides with sheet  -SP    Time 1 -- -- 7 min  -SP       Exercise 2    Exercise Name 2 -- -- wall slide  -SP    Time 2 -- -- 7 min  -SP       Exercise 3    Exercise Name 3 -- -- cycle seat 5  -SP    Time 3 -- -- 7 min  -SP       Exercise 4    Exercise Name 4 -- -- heel raises  -SP    Reps 4  -- -- 20  -SP       Exercise 5    Exercise Name 5 -- -- step ups-4 in  -SP    Reps 5 -- -- 15 x each  -SP       Exercise 6    Exercise Name 6 -- -- squats  -SP    Reps 6 -- -- 20  -SP       Exercise 7    Exercise Name 7 -- -- SAQ/LAQ  -SP    Reps 7 -- -- 20  -SP    Time 7 -- -- 2#  -SP       Exercise 8    Exercise Name 8 -- -- SLR  -SP    Reps 8 -- -- 20  -SP    Time 8 -- -- 1#  -SP       Exercise 9    Exercise Name 9 -- -- sidelying hip abduction  -SP       Exercise 10    Exercise Name 10 -- -- quad set knee/ankle  -SP    Reps 10 -- -- 15/15  -SP    Time 10 -- -- 5 sec  -SP       Exercise 11    Exercise Name 11 -- -- hamstring stretch  -SP    Reps 11 -- -- 15  -SP    Time 11 -- -- 10 sec  -SP       Exercise 12    Exercise Name 12 -- -- prone knee hang  -SP    Time 12 -- -- 5 min  -SP    Additional Comments -- -- 2# with MHP  -SP              User Key  (r) = Recorded By, (t) = Taken By, (c) = Cosigned By      Initials Name Provider Type    Rebecca Devries, PT Physical Therapist                             Manual Rx (Last 36 Hours)       Manual Treatments       Row Name 07/15/25 1614 07/15/25 1500          Total Minutes    43428 - PT Manual Therapy Minutes 15  -SP --        Manual Rx 1    Manual Rx 1 Location -- left knee  -SP     Manual Rx 1 Type -- PROM knee flexion stretch in sitting  -SP     Manual Rx 1 Duration -- 10 x 10 sec  -SP        Manual Rx 2    Manual Rx 2 Location -- left knee  -SP     Manual Rx 2 Type -- posterior femoral glide mobilization  -SP     Manual Rx 2 Duration -- 2-3 min  -SP               User Key  (r) = Recorded By, (t) = Taken By, (c) = Cosigned By      Initials Name Provider Type    Rebecca Devries, COOKIE Physical Therapist                        Therapy Education  Program: Reinforced  How Provided: Verbal  Provided to: Patient  Level of Understanding: Verbalized, Demonstrated    Outcome Measure Options: Lower Extremity Functional Scale (LEFS)         Time  Calculation:   Start Time: 1055  Stop Time: 1205  Time Calculation (min): 70 min  Timed Charges  57965 - PT Therapeutic Exercise Minutes: 15  08983 - PT Manual Therapy Minutes: 15  Untimed Charges  PT Ice Rx Minutes: 10  Total Minutes  Timed Charges Total Minutes: 30  Untimed Charges Total Minutes: 10   Total Minutes: 30  Therapy Charges for Today       Code Description Service Date Service Provider Modifiers Qty    95985252077 HC PT THER PROC EA 15 MIN 7/15/2025 Rebecca Armstrong, PT GP 1    43784316006 HC PT MANUAL THERAPY EA 15 MIN 7/15/2025 Rebecca Armstrong, PT GP 1            PT G-Codes  Outcome Measure Options: Lower Extremity Functional Scale (LEFS)         Rebecca Armstrong, PT  7/15/2025

## 2025-07-17 ENCOUNTER — HOSPITAL ENCOUNTER (OUTPATIENT)
Dept: PHYSICAL THERAPY | Facility: HOSPITAL | Age: 60
Setting detail: THERAPIES SERIES
Discharge: HOME | End: 2025-07-17
Payer: COMMERCIAL

## 2025-07-17 DIAGNOSIS — Z96.652 STATUS POST LEFT KNEE REPLACEMENT: Primary | ICD-10-CM

## 2025-07-17 PROCEDURE — 97110 THERAPEUTIC EXERCISES: CPT | Performed by: PHYSICAL THERAPIST

## 2025-07-17 PROCEDURE — 97140 MANUAL THERAPY 1/> REGIONS: CPT | Performed by: PHYSICAL THERAPIST

## 2025-07-17 NOTE — THERAPY TREATMENT NOTE
Outpatient Physical Therapy Ortho Treatment Note   Jason     Patient Name: Fanny Winchester  : 1965  MRN: [id number]  Today's Date: 2025      Visit Date: 2025    Visit Dx:    ICD-10-CM ICD-9-CM   1. Status post left knee replacement  Z96.652 V43.65       Patient Active Problem List   Diagnosis    Gastroesophageal reflux disease with esophagitis without hemorrhage    Fibromyalgia    Rheumatoid arthritis    DDD (degenerative disc disease), lumbar    Primary osteoarthritis of both knees    Elevated fasting glucose    Mixed hyperlipidemia    LGSIL Pap smear of vagina    Primary osteoarthritis of left knee    OA (osteoarthritis) of knee    S/P TKR (total knee replacement), left        Past Medical History:   Diagnosis Date    Anxiety     Mason tumor 2020    ovary    DDD (degenerative disc disease), lumbar 2022    Fibromyalgia     Fibromyalgia, primary     GERD (gastroesophageal reflux disease)     Low back pain     Osteoarthritis     Rheumatoid arthritis involving multiple sites         Past Surgical History:   Procedure Laterality Date    COLONOSCOPY N/A 2020    Procedure: COLONOSCOPY with polypectomy;  Surgeon: Adarsh Arredondo MD;  Location: [facility];  Service: Gastroenterology;  Laterality: N/A;  Rectal polyp    COLONOSCOPY  2020    ENDOSCOPY N/A 2020    Procedure: ESOPHAGOGASTRODUODENOSCOPY with biopsies;  Surgeon: Adarsh Arredondo MD;  Location: [facility];  Service: Gastroenterology;  Laterality: N/A;  Reflux esophagitis  Gastritis  Duodenitis  Hiatal hernia  Biopsies: distal esophagus, gastric, duodenum    GANGLION CYST EXCISION Left     HYSTERECTOMY  2000    OOPHORECTOMY Right 2020    TOTAL KNEE ARTHROPLASTY Left 2025    Procedure: TOTAL KNEE ARTHROPLASTY WITH CORI ROBOT;  Surgeon: Isaiah Meadows MD;  Location: [facility];  Service: Robotics - Ortho;  Laterality: Left;    TUMOR REMOVAL      ULNAR NERVE TRANSPOSITION Left  2019        PT Ortho       Row Name 07/17/25 1200       Subjective    Subjective Comments Patient reports she was sore after progression of exercises last visit  -SP              User Key  (r) = Recorded By, (t) = Taken By, (c) = Cosigned By      Initials Name Provider Type    Rebecca Devries, PT Physical Therapist                                 PT Assessment/Plan       Row Name 07/17/25 1228          PT Assessment    Assessment Comments Patient tolerates progression of ther-ex well.  -SP        PT Plan    PT Plan Comments Continue to progress left knee ROM and LE strengthening  -SP               User Key  (r) = Recorded By, (t) = Taken By, (c) = Cosigned By      Initials Name Provider Type    Rebecca Devries, PT Physical Therapist                     Modalities       Row Name 07/17/25 1200             Ice    Ice Applied Yes  -SP      Location left knee  -SP      PT Ice Rx Minutes 10  -SP      Ice S/P Rx Yes  -SP         Functional Testing    Outcome Measure Options Lower Extremity Functional Scale (LEFS)  -SP                User Key  (r) = Recorded By, (t) = Taken By, (c) = Cosigned By      Initials Name Provider Type    Rebecca Devries, PT Physical Therapist                   OP Exercises       Row Name 07/17/25 1228 07/17/25 1200 07/17/25 1000       Subjective    Subjective Comments -- Patient reports she was sore after progression of exercises last visit  -SP --       Total Minutes    35151 - PT Therapeutic Exercise Minutes 15  -SP -- --    95266 - PT Manual Therapy Minutes 15  -SP -- --       Exercise 1    Exercise Name 1 -- -- heel slides with sheet  -SP    Time 1 -- -- 7 min  -SP       Exercise 2    Exercise Name 2 -- -- wall slide  -SP    Time 2 -- -- 7 min  -SP       Exercise 3    Exercise Name 3 -- -- cycle seat 5  -SP    Time 3 -- -- 7 min  -SP       Exercise 4    Exercise Name 4 -- -- heel raises  -SP    Reps 4 -- -- 25  -SP       Exercise 5    Exercise Name 5 -- --  step ups-6 in  -SP    Reps 5 -- -- 15 x each  -SP       Exercise 6    Exercise Name 6 -- -- squats  -SP    Reps 6 -- -- 20  -SP       Exercise 7    Exercise Name 7 -- -- SAQ/LAQ  -SP    Reps 7 -- -- 20  -SP    Time 7 -- -- 2#  -SP       Exercise 8    Exercise Name 8 -- -- SLR  -SP    Reps 8 -- -- 20  -SP    Time 8 -- -- 1#  -SP       Exercise 9    Exercise Name 9 -- -- sidelying hip abduction  -SP       Exercise 10    Exercise Name 10 -- -- quad set knee/ankle  -SP    Reps 10 -- -- 15/15  -SP    Time 10 -- -- 5 sec  -SP       Exercise 11    Exercise Name 11 -- -- hamstring stretch  -SP    Reps 11 -- -- 15  -SP    Time 11 -- -- 10 sec  -SP       Exercise 12    Exercise Name 12 -- -- prone knee hang  -SP    Time 12 -- -- 5 min  -SP    Additional Comments -- -- 2# on ankle with MHP to posterior aspect of knee  -SP              User Key  (r) = Recorded By, (t) = Taken By, (c) = Cosigned By      Initials Name Provider Type    Rebecca Devries, COOKIE Physical Therapist                             Manual Rx (Last 36 Hours)       Manual Treatments       Row Name 07/17/25 1228 07/17/25 1100          Total Minutes    70775 - PT Manual Therapy Minutes 15  -SP --        Manual Rx 1    Manual Rx 1 Location -- left knee  -SP     Manual Rx 1 Type -- PROM knee flexion stretch in sitting  -SP     Manual Rx 1 Duration -- 10 x 10 sec  -SP        Manual Rx 2    Manual Rx 2 Location -- left knee  -SP     Manual Rx 2 Type -- posterior femoral glide mobilization  -SP     Manual Rx 2 Duration -- 2-3 min  -SP               User Key  (r) = Recorded By, (t) = Taken By, (c) = Cosigned By      Initials Name Provider Type    Rebecca Devries, COOKIE Physical Therapist                        Therapy Education  Given: HEP  Program: Reinforced  How Provided: Verbal  Level of Understanding: Verbalized, Demonstrated    Outcome Measure Options: Lower Extremity Functional Scale (LEFS)         Time Calculation:   Start Time: 1000  Stop  Time: 1115  Time Calculation (min): 75 min  Timed Charges  43788 - PT Therapeutic Exercise Minutes: 15  99569 - PT Manual Therapy Minutes: 15  Untimed Charges  PT Ice Rx Minutes: 10  Total Minutes  Timed Charges Total Minutes: 30  Untimed Charges Total Minutes: 10   Total Minutes: 30  Therapy Charges for Today       Code Description Service Date Service Provider Modifiers Qty    62021089825 HC PT THER PROC EA 15 MIN 7/17/2025 Rebecca Armstrong, PT GP 1    86073028947 HC PT MANUAL THERAPY EA 15 MIN 7/17/2025 Rebecca Armstrong, PT GP 1            PT G-Codes  Outcome Measure Options: Lower Extremity Functional Scale (LEFS)         Rebecca Armstrong, PT  7/17/2025

## 2025-07-22 ENCOUNTER — HOSPITAL ENCOUNTER (OUTPATIENT)
Dept: PHYSICAL THERAPY | Facility: HOSPITAL | Age: 60
Setting detail: THERAPIES SERIES
Discharge: HOME | End: 2025-07-22
Payer: COMMERCIAL

## 2025-07-22 DIAGNOSIS — Z96.652 STATUS POST LEFT KNEE REPLACEMENT: Primary | ICD-10-CM

## 2025-07-22 PROCEDURE — 97140 MANUAL THERAPY 1/> REGIONS: CPT | Performed by: PHYSICAL THERAPIST

## 2025-07-22 PROCEDURE — 97110 THERAPEUTIC EXERCISES: CPT | Performed by: PHYSICAL THERAPIST

## 2025-07-22 NOTE — THERAPY TREATMENT NOTE
Outpatient Physical Therapy Ortho Treatment Note   Jason     Patient Name: Fanny Winchester  : 1965  MRN: [id number]  Today's Date: 2025      Visit Date: 2025    Visit Dx:    ICD-10-CM ICD-9-CM   1. Status post left knee replacement  Z96.652 V43.65       Patient Active Problem List   Diagnosis    Gastroesophageal reflux disease with esophagitis without hemorrhage    Fibromyalgia    Rheumatoid arthritis    DDD (degenerative disc disease), lumbar    Primary osteoarthritis of both knees    Elevated fasting glucose    Mixed hyperlipidemia    LGSIL Pap smear of vagina    Primary osteoarthritis of left knee    OA (osteoarthritis) of knee    S/P TKR (total knee replacement), left        Past Medical History:   Diagnosis Date    Anxiety     Mason tumor 2020    ovary    DDD (degenerative disc disease), lumbar 2022    Fibromyalgia     Fibromyalgia, primary     GERD (gastroesophageal reflux disease)     Low back pain     Osteoarthritis     Rheumatoid arthritis involving multiple sites         Past Surgical History:   Procedure Laterality Date    COLONOSCOPY N/A 2020    Procedure: COLONOSCOPY with polypectomy;  Surgeon: Adarsh Arredondo MD;  Location: [facility];  Service: Gastroenterology;  Laterality: N/A;  Rectal polyp    COLONOSCOPY  2020    ENDOSCOPY N/A 2020    Procedure: ESOPHAGOGASTRODUODENOSCOPY with biopsies;  Surgeon: Adarsh Arredondo MD;  Location: [facility];  Service: Gastroenterology;  Laterality: N/A;  Reflux esophagitis  Gastritis  Duodenitis  Hiatal hernia  Biopsies: distal esophagus, gastric, duodenum    GANGLION CYST EXCISION Left     HYSTERECTOMY  2000    OOPHORECTOMY Right 2020    TOTAL KNEE ARTHROPLASTY Left 2025    Procedure: TOTAL KNEE ARTHROPLASTY WITH CORI ROBOT;  Surgeon: Isaiah Meadows MD;  Location: [facility];  Service: Robotics - Ortho;  Laterality: Left;    TUMOR REMOVAL      ULNAR NERVE TRANSPOSITION Left  2019        PT Ortho       Row Name 07/22/25 1600       Subjective    Subjective Comments Patient reports that she traveled over the weekend and did a lot of walking.  She is having increased soreness today.  -SP       Left Lower Ext    Lt Knee Extension/Flexion PROM 125 degrees  -SP              User Key  (r) = Recorded By, (t) = Taken By, (c) = Cosigned By      Initials Name Provider Type    Rebecca Devries, PT Physical Therapist                                 PT Assessment/Plan       Row Name 07/22/25 1621 07/22/25 1620       PT Assessment    Assessment Comments -- Patient continues to demonstrate gradually progressing left knee ROM.  -SP       PT Plan    PT Plan Comments Continue to progress left knee mobility and strengthening  -SP --              User Key  (r) = Recorded By, (t) = Taken By, (c) = Cosigned By      Initials Name Provider Type    Rebecca Devries, PT Physical Therapist                     Modalities       Row Name 07/22/25 1600             Ice    Ice Applied Yes  -SP      Location left knee  -SP      PT Ice Rx Minutes 10  -SP      Ice S/P Rx Yes  -SP         Functional Testing    Outcome Measure Options Lower Extremity Functional Scale (LEFS)  -SP                User Key  (r) = Recorded By, (t) = Taken By, (c) = Cosigned By      Initials Name Provider Type    Rebecca Devries, PT Physical Therapist                   OP Exercises       Row Name 07/22/25 1622 07/22/25 1600          Subjective    Subjective Comments -- Patient reports that she traveled over the weekend and did a lot of walking.  She is having increased soreness today.  -SP        Total Minutes    85063 - PT Therapeutic Exercise Minutes 15  -SP --     69261 - PT Manual Therapy Minutes 15  -SP --        Exercise 1    Exercise Name 1 -- heel slides with sheet  -SP     Time 1 -- 7 min  -SP        Exercise 2    Exercise Name 2 -- wall slide  -SP     Time 2 -- 7 min  -SP        Exercise 3    Exercise  "Name 3 -- cycle seat 5  -SP     Time 3 -- 7 min  -SP        Exercise 4    Exercise Name 4 -- heel raises  -SP     Reps 4 -- 25  -SP        Exercise 5    Exercise Name 5 -- step ups-6 in  -SP     Reps 5 -- 15 x each  -SP        Exercise 6    Exercise Name 6 -- squats  -SP     Reps 6 -- 20  -SP        Exercise 7    Exercise Name 7 -- SAQ/LAQ  -SP     Reps 7 -- 20  -SP     Time 7 -- 2#  -SP        Exercise 8    Exercise Name 8 -- SLR  -SP     Reps 8 -- 20  -SP     Time 8 -- 1#  -SP        Exercise 9    Exercise Name 9 -- sidelying hip abduction  -SP        Exercise 10    Exercise Name 10 -- quad set knee/ankle  -SP     Reps 10 -- 15/15  -SP     Time 10 -- 5 sec  -SP        Exercise 11    Exercise Name 11 -- hamstring stretch  -SP     Reps 11 -- 15  -SP     Time 11 -- 10 sec  -SP        Exercise 12    Exercise Name 12 -- prone knee hang  -SP     Time 12 -- 5 min  -SP     Additional Comments -- 2#  -SP        Exercise 13    Exercise Name 13 -- lateral step over 6 \"  -SP     Reps 13 -- 5  -SP        Exercise 14    Exercise Name 14 -- partial SLS on balance pad  -SP     Reps 14 -- 3  -SP     Time 14 -- 20 sec  -SP               User Key  (r) = Recorded By, (t) = Taken By, (c) = Cosigned By      Initials Name Provider Type    Rebecca Devries, PT Physical Therapist                             Manual Rx (Last 36 Hours)       Manual Treatments       Row Name 07/22/25 1622 07/22/25 1500          Total Minutes    05800 - PT Manual Therapy Minutes 15  -SP --        Manual Rx 1    Manual Rx 1 Location -- left knee  -SP     Manual Rx 1 Type -- PROM knee flexion stretch in sitting  -SP     Manual Rx 1 Duration -- 10 x 10 sec  -SP        Manual Rx 2    Manual Rx 2 Location -- left knee  -SP     Manual Rx 2 Type -- posterior femoral glide mobilization  -SP     Manual Rx 2 Duration -- 2-3 min  -SP               User Key  (r) = Recorded By, (t) = Taken By, (c) = Cosigned By      Initials Name Provider Type    JEREMY Armstrong" Rebecca Howell, PT Physical Therapist                        Therapy Education  Given: HEP  Program: Reinforced  How Provided: Verbal  Provided to: Patient  Level of Understanding: Verbalized, Demonstrated    Outcome Measure Options: Lower Extremity Functional Scale (LEFS)         Time Calculation:   Start Time: 1020  Stop Time: 1134  Time Calculation (min): 74 min  Timed Charges  29542 - PT Therapeutic Exercise Minutes: 15  82209 - PT Manual Therapy Minutes: 15  Untimed Charges  PT Ice Rx Minutes: 10  Total Minutes  Timed Charges Total Minutes: 30  Untimed Charges Total Minutes: 10   Total Minutes: 30  Therapy Charges for Today       Code Description Service Date Service Provider Modifiers Qty    67113121645 HC PT THER PROC EA 15 MIN 7/22/2025 Rebecca Armstrong, PT GP 1    03512925870 HC PT MANUAL THERAPY EA 15 MIN 7/22/2025 Rebecca Armstrong, PT GP 1            PT G-Codes  Outcome Measure Options: Lower Extremity Functional Scale (LEFS)         Rebecca Armstrong, PT  7/22/2025

## 2025-07-24 ENCOUNTER — HOSPITAL ENCOUNTER (OUTPATIENT)
Dept: PHYSICAL THERAPY | Facility: HOSPITAL | Age: 60
Setting detail: THERAPIES SERIES
Discharge: HOME | End: 2025-07-24
Payer: COMMERCIAL

## 2025-07-24 DIAGNOSIS — Z96.652 STATUS POST LEFT KNEE REPLACEMENT: Primary | ICD-10-CM

## 2025-07-24 PROCEDURE — 97110 THERAPEUTIC EXERCISES: CPT | Performed by: PHYSICAL THERAPIST

## 2025-07-24 PROCEDURE — 97140 MANUAL THERAPY 1/> REGIONS: CPT | Performed by: PHYSICAL THERAPIST

## 2025-07-24 NOTE — THERAPY TREATMENT NOTE
Outpatient Physical Therapy Ortho Treatment Note   Jason     Patient Name: Fanny Winchester  : 1965  MRN: [id number]  Today's Date: 2025      Visit Date: 2025    Visit Dx:    ICD-10-CM ICD-9-CM   1. Status post left knee replacement  Z96.652 V43.65       Patient Active Problem List   Diagnosis    Gastroesophageal reflux disease with esophagitis without hemorrhage    Fibromyalgia    Rheumatoid arthritis    DDD (degenerative disc disease), lumbar    Primary osteoarthritis of both knees    Elevated fasting glucose    Mixed hyperlipidemia    LGSIL Pap smear of vagina    Primary osteoarthritis of left knee    OA (osteoarthritis) of knee    S/P TKR (total knee replacement), left        Past Medical History:   Diagnosis Date    Anxiety     Mason tumor 2020    ovary    DDD (degenerative disc disease), lumbar 2022    Fibromyalgia     Fibromyalgia, primary     GERD (gastroesophageal reflux disease)     Low back pain     Osteoarthritis     Rheumatoid arthritis involving multiple sites         Past Surgical History:   Procedure Laterality Date    COLONOSCOPY N/A 2020    Procedure: COLONOSCOPY with polypectomy;  Surgeon: Adarsh Arredondo MD;  Location: [facility];  Service: Gastroenterology;  Laterality: N/A;  Rectal polyp    COLONOSCOPY  2020    ENDOSCOPY N/A 2020    Procedure: ESOPHAGOGASTRODUODENOSCOPY with biopsies;  Surgeon: Adarsh Arredondo MD;  Location: [facility];  Service: Gastroenterology;  Laterality: N/A;  Reflux esophagitis  Gastritis  Duodenitis  Hiatal hernia  Biopsies: distal esophagus, gastric, duodenum    GANGLION CYST EXCISION Left     HYSTERECTOMY  2000    OOPHORECTOMY Right 2020    TOTAL KNEE ARTHROPLASTY Left 2025    Procedure: TOTAL KNEE ARTHROPLASTY WITH CORI ROBOT;  Surgeon: Isaiah Meadows MD;  Location: [facility];  Service: Robotics - Ortho;  Laterality: Left;    TUMOR REMOVAL      ULNAR NERVE TRANSPOSITION Left  2019        PT Ortho       Row Name 07/24/25 1000       Subjective    Subjective Comments Patient reports that she having increased pain and stiffness today but cannot relate this to any change in activity.  -SP       Left Lower Ext    Lt Knee Extension/Flexion PROM 125 degrees  -SP              User Key  (r) = Recorded By, (t) = Taken By, (c) = Cosigned By      Initials Name Provider Type    Rebecca Devries, PT Physical Therapist                                 PT Assessment/Plan       Row Name 07/24/25 1400          PT Assessment    Assessment Comments Patient with increased complants of pain but is able to tolerate ther-ex well  -SP               User Key  (r) = Recorded By, (t) = Taken By, (c) = Cosigned By      Initials Name Provider Type    Rebecca Devries, PT Physical Therapist                       OP Exercises       Row Name 07/24/25 1401 07/24/25 1000          Subjective    Subjective Comments -- Patient reports that she having increased pain and stiffness today but cannot relate this to any change in activity.  -SP        Total Minutes    31169 - PT Therapeutic Exercise Minutes 15  -SP --     85907 - PT Manual Therapy Minutes 15  -SP --        Exercise 1    Exercise Name 1 -- heel slides with sheet  -SP     Time 1 -- 7 min  -SP        Exercise 2    Exercise Name 2 -- wall slide  -SP     Time 2 -- 7 min  -SP        Exercise 3    Exercise Name 3 -- cycle seat 5  -SP     Time 3 -- 7 min  -SP        Exercise 4    Exercise Name 4 -- heel raises  -SP     Reps 4 -- 25  -SP        Exercise 5    Exercise Name 5 -- step ups-6 in  -SP     Reps 5 -- 15 x each  -SP        Exercise 6    Exercise Name 6 -- squats  -SP     Reps 6 -- 20  -SP        Exercise 7    Exercise Name 7 -- SAQ/LAQ  -SP     Reps 7 -- 25  -SP     Time 7 -- 2#  -SP        Exercise 8    Exercise Name 8 -- SLR  -SP     Reps 8 -- 25  -SP     Time 8 -- 1#  -SP        Exercise 9    Exercise Name 9 -- sidelying hip abduction  -SP   "   Reps 9 -- 25  -SP     Time 9 -- 1#  -SP        Exercise 10    Exercise Name 10 -- quad set knee/ankle  -SP     Reps 10 -- 15/15  -SP     Time 10 -- 5 sec  -SP        Exercise 11    Exercise Name 11 -- hamstring stretch  -SP     Reps 11 -- 15  -SP     Time 11 -- 10 sec  -SP        Exercise 12    Exercise Name 12 -- prone knee hang  -SP     Time 12 -- 5 min  -SP        Exercise 13    Exercise Name 13 -- lateral step over 6 \"  -SP     Reps 13 -- 5  -SP        Exercise 14    Exercise Name 14 -- partial SLS on balance pad  -SP     Reps 14 -- 3  -SP     Time 14 -- 20 sec  -SP               User Key  (r) = Recorded By, (t) = Taken By, (c) = Cosigned By      Initials Name Provider Type    Rebecca Devries, COOKIE Physical Therapist                             Manual Rx (Last 36 Hours)       Manual Treatments       Row Name 07/24/25 1401 07/24/25 1400          Total Minutes    08867 - PT Manual Therapy Minutes 15  -SP --        Manual Rx 1    Manual Rx 1 Location -- left knee  -SP     Manual Rx 1 Type -- PROM knee flexion stretch in sitting  -SP     Manual Rx 1 Duration -- 10 x 10 sec  -SP        Manual Rx 2    Manual Rx 2 Location -- left knee  -SP     Manual Rx 2 Type -- posterior femoral glide mobilization  -SP     Manual Rx 2 Duration -- 2-3 min  -SP               User Key  (r) = Recorded By, (t) = Taken By, (c) = Cosigned By      Initials Name Provider Type    Rebecac Devries, PT Physical Therapist                        Therapy Education  Given: HEP  Program: Reinforced  How Provided: Verbal  Provided to: Patient  Level of Understanding: Verbalized, Demonstrated              Time Calculation:   Start Time: 0920  Stop Time: 1030  Time Calculation (min): 70 min  Timed Charges  72297 - PT Therapeutic Exercise Minutes: 15  27561 - PT Manual Therapy Minutes: 15  Total Minutes  Timed Charges Total Minutes: 30   Total Minutes: 30  Therapy Charges for Today       Code Description Service Date Service " Provider Modifiers Qty    [id number]  PT THER PROC EA 15 MIN 7/24/2025 Rebecca Armstrong, PT GP 1    [id number]  PT MANUAL THERAPY EA 15 MIN 7/24/2025 Rebecca Armstrong, PT GP 1                      Rebecca Armstrong, PT  7/24/2025

## 2025-07-29 ENCOUNTER — HOSPITAL ENCOUNTER (OUTPATIENT)
Dept: PHYSICAL THERAPY | Facility: HOSPITAL | Age: 60
Setting detail: THERAPIES SERIES
Discharge: HOME | End: 2025-07-29
Payer: COMMERCIAL

## 2025-07-29 DIAGNOSIS — Z96.652 STATUS POST LEFT KNEE REPLACEMENT: Primary | ICD-10-CM

## 2025-07-29 PROCEDURE — 97140 MANUAL THERAPY 1/> REGIONS: CPT | Performed by: PHYSICAL THERAPIST

## 2025-07-29 PROCEDURE — 97110 THERAPEUTIC EXERCISES: CPT | Performed by: PHYSICAL THERAPIST

## 2025-07-29 NOTE — THERAPY TREATMENT NOTE
Outpatient Physical Therapy Ortho Treatment Note   Jason     Patient Name: Fanny Winchester  : 1965  MRN: [id number]  Today's Date: 2025      Visit Date: 2025    Visit Dx:    ICD-10-CM ICD-9-CM   1. Status post left knee replacement  Z96.652 V43.65       Patient Active Problem List   Diagnosis    Gastroesophageal reflux disease with esophagitis without hemorrhage    Fibromyalgia    Rheumatoid arthritis    DDD (degenerative disc disease), lumbar    Primary osteoarthritis of both knees    Elevated fasting glucose    Mixed hyperlipidemia    LGSIL Pap smear of vagina    Primary osteoarthritis of left knee    OA (osteoarthritis) of knee    S/P TKR (total knee replacement), left        Past Medical History:   Diagnosis Date    Anxiety     Mason tumor 2020    ovary    DDD (degenerative disc disease), lumbar 2022    Fibromyalgia     Fibromyalgia, primary     GERD (gastroesophageal reflux disease)     Low back pain     Osteoarthritis     Rheumatoid arthritis involving multiple sites         Past Surgical History:   Procedure Laterality Date    COLONOSCOPY N/A 2020    Procedure: COLONOSCOPY with polypectomy;  Surgeon: Adarsh Arredondo MD;  Location: [facility];  Service: Gastroenterology;  Laterality: N/A;  Rectal polyp    COLONOSCOPY  2020    ENDOSCOPY N/A 2020    Procedure: ESOPHAGOGASTRODUODENOSCOPY with biopsies;  Surgeon: Adarsh Arredondo MD;  Location: [facility];  Service: Gastroenterology;  Laterality: N/A;  Reflux esophagitis  Gastritis  Duodenitis  Hiatal hernia  Biopsies: distal esophagus, gastric, duodenum    GANGLION CYST EXCISION Left     HYSTERECTOMY  2000    OOPHORECTOMY Right 2020    TOTAL KNEE ARTHROPLASTY Left 2025    Procedure: TOTAL KNEE ARTHROPLASTY WITH CORI ROBOT;  Surgeon: Isaiah Meadows MD;  Location: [facility];  Service: Robotics - Ortho;  Laterality: Left;    TUMOR REMOVAL      ULNAR NERVE TRANSPOSITION Left  2019        PT Ortho       Row Name 07/29/25 1600       Subjective    Subjective Comments Patient reports that she has been more active and her knee continues to feel sore and stiff  -SP              User Key  (r) = Recorded By, (t) = Taken By, (c) = Cosigned By      Initials Name Provider Type    Rebecca Devries, PT Physical Therapist                                 PT Assessment/Plan       Row Name 07/29/25 1643          PT Assessment    Assessment Comments Patient tolerates ther-ex well.  -SP        PT Plan    PT Plan Comments Continue to progress as tolerable  -SP               User Key  (r) = Recorded By, (t) = Taken By, (c) = Cosigned By      Initials Name Provider Type    Rebecca Devries, PT Physical Therapist                     Modalities       Row Name 07/29/25 1600             Ice    Ice Applied Yes  -SP      Location left knee  -SP      PT Ice Rx Minutes 10  -SP      Ice S/P Rx Yes  -SP         Functional Testing    Outcome Measure Options Lower Extremity Functional Scale (LEFS)  -SP                User Key  (r) = Recorded By, (t) = Taken By, (c) = Cosigned By      Initials Name Provider Type    Rebecca Devries, PT Physical Therapist                   OP Exercises       Row Name 07/29/25 1644 07/29/25 1600          Subjective    Subjective Comments -- Patient reports that she has been more active and her knee continues to feel sore and stiff  -SP        Total Minutes    79102 - PT Therapeutic Exercise Minutes 15  -SP --     77183 - PT Manual Therapy Minutes 15  -SP --        Exercise 1    Exercise Name 1 -- heel slides with sheet  -SP     Time 1 -- 7 min  -SP        Exercise 2    Exercise Name 2 -- wall slide  -SP     Time 2 -- 7 min  -SP        Exercise 3    Exercise Name 3 -- cycle seat 5  -SP     Time 3 -- 7 min  -SP        Exercise 4    Exercise Name 4 -- heel raises  -SP     Reps 4 -- 25  -SP        Exercise 5    Exercise Name 5 -- step ups-6 in  -SP     Reps 5 --  "15 x each  -SP        Exercise 6    Exercise Name 6 -- squats  -SP     Reps 6 -- 20  -SP        Exercise 7    Exercise Name 7 -- SAQ/LAQ  -SP     Reps 7 -- 25  -SP     Time 7 -- 2#  -SP        Exercise 8    Exercise Name 8 -- SLR  -SP     Reps 8 -- 25  -SP     Time 8 -- 1#  -SP        Exercise 9    Exercise Name 9 -- sidelying hip abduction  -SP     Reps 9 -- 25  -SP     Time 9 -- 1#  -SP        Exercise 10    Exercise Name 10 -- quad set knee/ankle  -SP     Reps 10 -- 15/15  -SP     Time 10 -- 5 sec  -SP        Exercise 11    Exercise Name 11 -- hamstring stretch  -SP     Reps 11 -- 15  -SP     Time 11 -- 10 sec  -SP        Exercise 12    Exercise Name 12 -- prone knee hang  -SP     Time 12 -- 5 min  -SP        Exercise 13    Exercise Name 13 -- lateral step over 6 \"  -SP     Reps 13 -- 5  -SP        Exercise 14    Exercise Name 14 -- partial SLS on balance pad  -SP     Reps 14 -- 3  -SP     Time 14 -- 20 sec  -SP               User Key  (r) = Recorded By, (t) = Taken By, (c) = Cosigned By      Initials Name Provider Type    Rebecca Devries, COOKIE Physical Therapist                             Manual Rx (Last 36 Hours)       Manual Treatments       Row Name 07/29/25 1644 07/29/25 1500          Total Minutes    21402 - PT Manual Therapy Minutes 15  -SP --        Manual Rx 1    Manual Rx 1 Location -- left knee  -SP     Manual Rx 1 Type -- PROM knee flexion stretch in sitting  -SP     Manual Rx 1 Duration -- 10 x 10 sec  -SP        Manual Rx 2    Manual Rx 2 Location -- left knee  -SP     Manual Rx 2 Type -- posterior femoral glide mobilization  -SP     Manual Rx 2 Duration -- 2-3 min  -SP               User Key  (r) = Recorded By, (t) = Taken By, (c) = Cosigned By      Initials Name Provider Type    Rebecca Devries, COOKIE Physical Therapist                        Therapy Education  Given: HEP  Program: Reinforced  How Provided: Verbal  Provided to: Patient  Level of Understanding: Verbalized, " Demonstrated    Outcome Measure Options: Lower Extremity Functional Scale (LEFS)         Time Calculation:   Start Time: 1020  Stop Time: 1135  Time Calculation (min): 75 min  Timed Charges  02405 - PT Therapeutic Exercise Minutes: 15  35384 - PT Manual Therapy Minutes: 15  Untimed Charges  PT Ice Rx Minutes: 10  Total Minutes  Timed Charges Total Minutes: 30  Untimed Charges Total Minutes: 10   Total Minutes: 30  Therapy Charges for Today       Code Description Service Date Service Provider Modifiers Qty    91660635744 HC PT THER PROC EA 15 MIN 7/29/2025 Rebecca Armstrong, PT GP 1    [id number] HC PT MANUAL THERAPY EA 15 MIN 7/29/2025 Rebecca Armstrong, PT GP 1            PT G-Codes  Outcome Measure Options: Lower Extremity Functional Scale (LEFS)         Rebecca Armstrong, PT  7/29/2025

## 2025-07-31 ENCOUNTER — HOSPITAL ENCOUNTER (OUTPATIENT)
Dept: PHYSICAL THERAPY | Facility: HOSPITAL | Age: 60
Setting detail: THERAPIES SERIES
Discharge: HOME | End: 2025-07-31
Payer: COMMERCIAL

## 2025-07-31 DIAGNOSIS — Z96.652 STATUS POST LEFT KNEE REPLACEMENT: Primary | ICD-10-CM

## 2025-07-31 PROCEDURE — 97110 THERAPEUTIC EXERCISES: CPT | Performed by: PHYSICAL THERAPIST

## 2025-07-31 PROCEDURE — 97140 MANUAL THERAPY 1/> REGIONS: CPT | Performed by: PHYSICAL THERAPIST

## 2025-07-31 NOTE — THERAPY TREATMENT NOTE
Outpatient Physical Therapy Ortho Treatment Note   Jason     Patient Name: Fanny Winchester  : 1965  MRN: [id number]  Today's Date: 2025      Visit Date: 2025    Visit Dx:    ICD-10-CM ICD-9-CM   1. Status post left knee replacement  Z96.652 V43.65       Patient Active Problem List   Diagnosis    Gastroesophageal reflux disease with esophagitis without hemorrhage    Fibromyalgia    Rheumatoid arthritis    DDD (degenerative disc disease), lumbar    Primary osteoarthritis of both knees    Elevated fasting glucose    Mixed hyperlipidemia    LGSIL Pap smear of vagina    Primary osteoarthritis of left knee    OA (osteoarthritis) of knee    S/P TKR (total knee replacement), left        Past Medical History:   Diagnosis Date    Anxiety     Mason tumor 2020    ovary    DDD (degenerative disc disease), lumbar 2022    Fibromyalgia     Fibromyalgia, primary     GERD (gastroesophageal reflux disease)     Low back pain     Osteoarthritis     Rheumatoid arthritis involving multiple sites         Past Surgical History:   Procedure Laterality Date    COLONOSCOPY N/A 2020    Procedure: COLONOSCOPY with polypectomy;  Surgeon: Adarsh Arredondo MD;  Location: [facility];  Service: Gastroenterology;  Laterality: N/A;  Rectal polyp    COLONOSCOPY  2020    ENDOSCOPY N/A 2020    Procedure: ESOPHAGOGASTRODUODENOSCOPY with biopsies;  Surgeon: Adarsh Arredondo MD;  Location: [facility];  Service: Gastroenterology;  Laterality: N/A;  Reflux esophagitis  Gastritis  Duodenitis  Hiatal hernia  Biopsies: distal esophagus, gastric, duodenum    GANGLION CYST EXCISION Left     HYSTERECTOMY  2000    OOPHORECTOMY Right 2020    TOTAL KNEE ARTHROPLASTY Left 2025    Procedure: TOTAL KNEE ARTHROPLASTY WITH CORI ROBOT;  Surgeon: Isaiah Meadows MD;  Location: [facility];  Service: Robotics - Ortho;  Laterality: Left;    TUMOR REMOVAL      ULNAR NERVE TRANSPOSITION Left  2019        PT Ortho       Row Name 07/31/25 1600       Subjective    Subjective Comments Patient reports that she had difficulty sleeping last night and has had increased left knee soreness but thinks this is likely due to being more active.  -SP              User Key  (r) = Recorded By, (t) = Taken By, (c) = Cosigned By      Initials Name Provider Type    Rebecca Devries, PT Physical Therapist                                 PT Assessment/Plan       Row Name 07/31/25 1653          PT Assessment    Assessment Comments Patient demonstrates ongoing left knee mobility.  She will be unable to attend therapy next week but is independent with HEP  -SP        PT Plan    PT Plan Comments Continue to progress left knee ROM and strengthening  -SP               User Key  (r) = Recorded By, (t) = Taken By, (c) = Cosigned By      Initials Name Provider Type    Rebecca Devries, PT Physical Therapist                     Modalities       Row Name 07/31/25 1600             Ice    Ice Applied Yes  -SP      Location left knee  -SP      PT Ice Rx Minutes 10  -SP      Ice S/P Rx Yes  -SP         Functional Testing    Outcome Measure Options Lower Extremity Functional Scale (LEFS)  -SP                User Key  (r) = Recorded By, (t) = Taken By, (c) = Cosigned By      Initials Name Provider Type    Rebecca Devries, PT Physical Therapist                   OP Exercises       Row Name 07/31/25 1656 07/31/25 1600          Subjective    Subjective Comments -- Patient reports that she had difficulty sleeping last night and has had increased left knee soreness but thinks this is likely due to being more active.  -SP        Total Minutes    16399 - PT Therapeutic Exercise Minutes 15  -SP --     39355 - PT Manual Therapy Minutes 15  -SP --        Exercise 1    Exercise Name 1 -- heel slides with sheet  -SP     Time 1 -- 7 min  -SP        Exercise 2    Exercise Name 2 -- wall slide  -SP     Time 2 -- 7 min  -SP   "      Exercise 3    Exercise Name 3 -- cycle seat 5  -SP     Time 3 -- 7 min  -SP        Exercise 4    Exercise Name 4 -- heel raises  -SP     Reps 4 -- 25  -SP        Exercise 5    Exercise Name 5 -- step ups-6 in  -SP     Reps 5 -- 15 x each  -SP        Exercise 6    Exercise Name 6 -- squats  -SP     Reps 6 -- 20  -SP        Exercise 7    Exercise Name 7 -- SAQ/LAQ  -SP     Reps 7 -- 25  -SP     Time 7 -- 2#  -SP        Exercise 8    Exercise Name 8 -- SLR  -SP     Reps 8 -- 25  -SP     Time 8 -- 1#  -SP        Exercise 9    Exercise Name 9 -- sidelying hip abduction  -SP     Reps 9 -- 25  -SP     Time 9 -- 1#  -SP        Exercise 10    Exercise Name 10 -- quad set knee/ankle  -SP     Reps 10 -- 15/15  -SP     Time 10 -- 5 sec  -SP        Exercise 11    Exercise Name 11 -- hamstring stretch  -SP     Reps 11 -- 15  -SP     Time 11 -- 10 sec  -SP        Exercise 12    Exercise Name 12 -- prone knee hang  -SP     Time 12 -- 5 min  -SP        Exercise 13    Exercise Name 13 -- lateral step over 6 \"  -SP     Reps 13 -- 5  -SP        Exercise 14    Exercise Name 14 -- partial SLS on balance pad  -SP     Reps 14 -- 3  -SP     Time 14 -- 20 sec  -SP               User Key  (r) = Recorded By, (t) = Taken By, (c) = Cosigned By      Initials Name Provider Type    Rebecca Devries, PT Physical Therapist                             Manual Rx (Last 36 Hours)       Manual Treatments       Row Name 07/31/25 1656 07/31/25 1500          Total Minutes    88486 - PT Manual Therapy Minutes 15  -SP --        Manual Rx 1    Manual Rx 1 Location -- left knee  -SP     Manual Rx 1 Type -- PROM knee flexion stretch in sitting  -SP     Manual Rx 1 Duration -- 10 x 10 sec  -SP        Manual Rx 2    Manual Rx 2 Location -- left knee  -SP     Manual Rx 2 Type -- posterior femoral glide mobilization  -SP     Manual Rx 2 Duration -- 2-3 min  -SP               User Key  (r) = Recorded By, (t) = Taken By, (c) = Cosigned By      Initials " Name Provider Type    SP Rebecca Armstrong, PT Physical Therapist                        Therapy Education  Given: HEP  Program: Reinforced  How Provided: Verbal  Provided to: Patient  Level of Understanding: Verbalized, Demonstrated    Outcome Measure Options: Lower Extremity Functional Scale (LEFS)         Time Calculation:   Start Time: 0920  Stop Time: 1035  Time Calculation (min): 75 min  Timed Charges  60169 - PT Therapeutic Exercise Minutes: 15  26053 - PT Manual Therapy Minutes: 15  Untimed Charges  PT Ice Rx Minutes: 10  Total Minutes  Timed Charges Total Minutes: 30  Untimed Charges Total Minutes: 10   Total Minutes: 30  Therapy Charges for Today       Code Description Service Date Service Provider Modifiers Qty    08925269088 HC PT THER PROC EA 15 MIN 7/31/2025 Rebecca Armstrong, PT GP 1    [id number] HC PT MANUAL THERAPY EA 15 MIN 7/31/2025 Rebecca Armstrong, PT GP 1            PT G-Codes  Outcome Measure Options: Lower Extremity Functional Scale (LEFS)         Rebecca Armstrong, PT  7/31/2025

## 2025-08-13 ENCOUNTER — HOSPITAL ENCOUNTER (OUTPATIENT)
Dept: PHYSICAL THERAPY | Facility: HOSPITAL | Age: 60
Setting detail: THERAPIES SERIES
Discharge: HOME | End: 2025-08-13
Payer: COMMERCIAL

## 2025-08-13 DIAGNOSIS — Z96.652 STATUS POST LEFT KNEE REPLACEMENT: Primary | ICD-10-CM

## 2025-08-13 PROCEDURE — 97140 MANUAL THERAPY 1/> REGIONS: CPT | Performed by: PHYSICAL THERAPIST

## 2025-08-13 PROCEDURE — 97110 THERAPEUTIC EXERCISES: CPT | Performed by: PHYSICAL THERAPIST

## 2025-08-19 ENCOUNTER — HOSPITAL ENCOUNTER (OUTPATIENT)
Dept: PHYSICAL THERAPY | Facility: HOSPITAL | Age: 60
Setting detail: THERAPIES SERIES
Discharge: HOME | End: 2025-08-19
Payer: COMMERCIAL

## 2025-08-19 ENCOUNTER — OFFICE VISIT (OUTPATIENT)
Dept: ORTHOPEDIC SURGERY | Facility: CLINIC | Age: 60
End: 2025-08-19
Payer: COMMERCIAL

## 2025-08-19 VITALS
HEART RATE: 86 BPM | BODY MASS INDEX: 32.67 KG/M2 | HEIGHT: 63 IN | WEIGHT: 184.4 LBS | SYSTOLIC BLOOD PRESSURE: 137 MMHG | DIASTOLIC BLOOD PRESSURE: 83 MMHG

## 2025-08-19 DIAGNOSIS — M17.11 PRIMARY OSTEOARTHRITIS OF RIGHT KNEE: ICD-10-CM

## 2025-08-19 DIAGNOSIS — Z96.652 STATUS POST LEFT KNEE REPLACEMENT: Primary | ICD-10-CM

## 2025-08-19 DIAGNOSIS — Z96.652 S/P TKR (TOTAL KNEE REPLACEMENT), LEFT: Primary | ICD-10-CM

## 2025-08-19 DIAGNOSIS — M25.562 PAIN IN BOTH KNEES, UNSPECIFIED CHRONICITY: ICD-10-CM

## 2025-08-19 DIAGNOSIS — M25.561 PAIN IN BOTH KNEES, UNSPECIFIED CHRONICITY: ICD-10-CM

## 2025-08-19 PROCEDURE — 97140 MANUAL THERAPY 1/> REGIONS: CPT | Performed by: PHYSICAL THERAPIST

## 2025-08-19 PROCEDURE — 97110 THERAPEUTIC EXERCISES: CPT | Performed by: PHYSICAL THERAPIST

## 2025-08-19 RX ORDER — CHLORHEXIDINE GLUCONATE 500 MG/1
CLOTH TOPICAL ONCE
OUTPATIENT
Start: 2025-08-19

## 2025-08-27 ENCOUNTER — HOSPITAL ENCOUNTER (OUTPATIENT)
Dept: PHYSICAL THERAPY | Facility: HOSPITAL | Age: 60
Setting detail: THERAPIES SERIES
Discharge: HOME | End: 2025-08-27
Payer: COMMERCIAL

## 2025-08-27 DIAGNOSIS — Z96.652 STATUS POST LEFT KNEE REPLACEMENT: Primary | ICD-10-CM

## 2025-08-27 PROCEDURE — 97110 THERAPEUTIC EXERCISES: CPT | Performed by: PHYSICAL THERAPIST

## 2025-08-27 PROCEDURE — 97140 MANUAL THERAPY 1/> REGIONS: CPT | Performed by: PHYSICAL THERAPIST

## (undated) DEVICE — NEEDLE, QUINCKE, 20GX3.5": Brand: MEDLINE

## (undated) DEVICE — VIAL FORMALIN CAP 10P 40ML

## (undated) DEVICE — DRP SURG U/DRP INVISISHIELD PA 48X52IN

## (undated) DEVICE — GLV SURG SENSICARE PI MIC PF SZ7.5 LF STRL

## (undated) DEVICE — YANKAUER,BULB TIP,W/O VENT,RIGID,STERILE: Brand: MEDLINE

## (undated) DEVICE — SOL ISO/ALC RUB 70PCT 4OZ

## (undated) DEVICE — FOAM BUMP ROUND LARGE: Brand: MEDLINE INDUSTRIES, INC.

## (undated) DEVICE — 3M™ DURAPORE™ SURGICAL TAPE 2 INCHES X 10YARDS (5.0CM X 9.1M) 6ROLLS/CARTON 10CARTONS/CASE 1538-2: Brand: 3M™ DURAPORE™

## (undated) DEVICE — DISPOSABLE TOURNIQUET CUFF 34"X4", 1-LINE, BLUE, STERILE, 1EA/PK, 10PK/CS: Brand: ASP MEDICAL

## (undated) DEVICE — KT ORCA ORCAPOD DISP STRL

## (undated) DEVICE — SUT ETHIB 1 CTX CR8 18IN CX30D

## (undated) DEVICE — THE BITE BLOCK MAXI, LATEX FREE STRAP IS USED TO PROTECT THE ENDOSCOPE INSERTION TUBE FROM BEING BITTEN BY THE PATIENT.

## (undated) DEVICE — INTENDED USE FOR SURGICAL MARKING ON INTACT SKIN, ALSO PROVIDES A PERMANENT METHOD OF IDENTIFYING OBJECTS IN THE OPERATING ROOM: Brand: WRITESITE® REGULAR TIP SKIN MARKER

## (undated) DEVICE — SPNG GZ WOVN 4X4IN 12PLY 10/BX STRL

## (undated) DEVICE — MAT FLR ABSORBENT LG 4FT 10 2.5FT

## (undated) DEVICE — DUAL CUT SAGITTAL BLADE

## (undated) DEVICE — FRAZIER SUCTION INSTRUMENT 10 FR W/CONTROL VENT & OBTURATOR: Brand: FRAZIER

## (undated) DEVICE — FRCP BX RADJAW4 NDL 2.8 240CM LG OG BX40

## (undated) DEVICE — GLV SURG SENSICARE PI LF PF 8 GRN STRL

## (undated) DEVICE — SYS IRR SURGIPHOR A/MIC RTU BO PVPI 450ML STRL

## (undated) DEVICE — GLV SURG SENSICARE PI LF PF 7.5

## (undated) DEVICE — EXOFIN PRECISION PEN HIGH VISCOSITY TOPICAL SKIN ADHESIVE: Brand: EXOFIN PRECISION PEN, 1G

## (undated) DEVICE — KT MIX CMT PALAMIX/UNO VAC WO/CMT

## (undated) DEVICE — PK TOTL 90

## (undated) DEVICE — SYR LUERLOK 30CC

## (undated) DEVICE — SOL IRR H2O BO 1000ML STRL

## (undated) DEVICE — Device

## (undated) DEVICE — SUCTION CANISTER, 3000CC,SAFELINER: Brand: DEROYAL

## (undated) DEVICE — WRAP KN COMPR COLD/THERP

## (undated) DEVICE — INTENDED FOR TISSUE SEPARATION, AND OTHER PROCEDURES THAT REQUIRE A SHARP SURGICAL BLADE TO PUNCTURE OR CUT.: Brand: BARD-PARKER ® STAINLESS STEEL BLADES

## (undated) DEVICE — BW-412T DISP COMBO CLEANING BRUSH: Brand: SINGLE USE COMBINATION CLEANING BRUSH

## (undated) DEVICE — JACKT LAB F/R KNIT CUFF/COLR XLG BLU